# Patient Record
Sex: MALE | Race: WHITE | NOT HISPANIC OR LATINO | Employment: OTHER | URBAN - METROPOLITAN AREA
[De-identification: names, ages, dates, MRNs, and addresses within clinical notes are randomized per-mention and may not be internally consistent; named-entity substitution may affect disease eponyms.]

---

## 2017-01-11 ENCOUNTER — APPOINTMENT (OUTPATIENT)
Dept: PAIN MEDICINE | Facility: HOSPITAL | Age: 62
End: 2017-01-11

## 2017-01-16 ENCOUNTER — HOSPITAL ENCOUNTER (OUTPATIENT)
Dept: GENERAL RADIOLOGY | Facility: HOSPITAL | Age: 62
Discharge: HOME | End: 2017-01-16

## 2017-01-16 ENCOUNTER — ANESTHESIA (OUTPATIENT)
Dept: PAIN MEDICINE | Facility: HOSPITAL | Age: 62
End: 2017-01-16

## 2017-01-16 ENCOUNTER — ANESTHESIA EVENT (OUTPATIENT)
Dept: PAIN MEDICINE | Facility: HOSPITAL | Age: 62
End: 2017-01-16

## 2017-01-16 ENCOUNTER — HOSPITAL ENCOUNTER (OUTPATIENT)
Dept: PAIN MEDICINE | Facility: HOSPITAL | Age: 62
Discharge: HOME | End: 2017-01-16
Admitting: ORTHOPAEDIC SURGERY

## 2017-01-16 VITALS
TEMPERATURE: 98.5 F | SYSTOLIC BLOOD PRESSURE: 190 MMHG | HEART RATE: 64 BPM | HEIGHT: 68 IN | WEIGHT: 215 LBS | OXYGEN SATURATION: 97 % | DIASTOLIC BLOOD PRESSURE: 103 MMHG | BODY MASS INDEX: 32.58 KG/M2 | RESPIRATION RATE: 16 BRPM

## 2017-01-16 DIAGNOSIS — R52 PAIN: ICD-10-CM

## 2017-01-16 PROCEDURE — C1755 CATHETER, INTRASPINAL: HCPCS

## 2017-01-16 PROCEDURE — 77003 FLUOROGUIDE FOR SPINE INJECT: CPT

## 2017-01-16 PROCEDURE — 25010000002 METHYLPREDNISOLONE PER 80 MG: Performed by: ANESTHESIOLOGY

## 2017-01-16 PROCEDURE — 0 IOPAMIDOL 41 % SOLUTION: Performed by: ANESTHESIOLOGY

## 2017-01-16 RX ORDER — LIDOCAINE HYDROCHLORIDE 10 MG/ML
1 INJECTION, SOLUTION INFILTRATION; PERINEURAL ONCE
Status: DISCONTINUED | OUTPATIENT
Start: 2017-01-16 | End: 2017-01-17 | Stop reason: HOSPADM

## 2017-01-16 RX ORDER — SODIUM CHLORIDE 0.9 % (FLUSH) 0.9 %
1-10 SYRINGE (ML) INJECTION AS NEEDED
Status: DISCONTINUED | OUTPATIENT
Start: 2017-01-16 | End: 2017-01-17 | Stop reason: HOSPADM

## 2017-01-16 RX ORDER — FENTANYL CITRATE 50 UG/ML
50 INJECTION, SOLUTION INTRAMUSCULAR; INTRAVENOUS
Status: DISCONTINUED | OUTPATIENT
Start: 2017-01-16 | End: 2017-01-17 | Stop reason: HOSPADM

## 2017-01-16 RX ORDER — BUPIVACAINE HYDROCHLORIDE 2.5 MG/ML
30 INJECTION, SOLUTION EPIDURAL; INFILTRATION; INTRACAUDAL ONCE
Status: DISCONTINUED | OUTPATIENT
Start: 2017-01-16 | End: 2017-01-17 | Stop reason: HOSPADM

## 2017-01-16 RX ORDER — METHYLPREDNISOLONE ACETATE 80 MG/ML
80 INJECTION, SUSPENSION INTRA-ARTICULAR; INTRALESIONAL; INTRAMUSCULAR; SOFT TISSUE ONCE
Status: COMPLETED | OUTPATIENT
Start: 2017-01-16 | End: 2017-01-16

## 2017-01-16 RX ORDER — MIDAZOLAM HYDROCHLORIDE 1 MG/ML
1 INJECTION INTRAMUSCULAR; INTRAVENOUS
Status: DISCONTINUED | OUTPATIENT
Start: 2017-01-16 | End: 2017-01-17 | Stop reason: HOSPADM

## 2017-01-16 RX ADMIN — IOPAMIDOL 10 ML: 408 INJECTION, SOLUTION INTRATHECAL at 12:47

## 2017-01-16 RX ADMIN — METHYLPREDNISOLONE ACETATE 80 MG: 80 INJECTION, SUSPENSION INTRA-ARTICULAR; INTRALESIONAL; INTRAMUSCULAR; SOFT TISSUE at 12:48

## 2017-01-16 NOTE — ANESTHESIA PROCEDURE NOTES
PAIN Epidural block    Patient location during procedure: pain clinic  Indication:procedure for pain  Performed By  Anesthesiologist: MARK ROD  Preanesthetic Checklist  Completed: patient identified, site marked, surgical consent, pre-op evaluation, timeout performed, risks and benefits discussed and monitors and equipment checked  Additional Notes  Depomedrol - 80mg    Needle position confirmed by fluoroscopy and epidurogram using 2cc of [id number].    Diagnosis  Post-Op Diagnosis Codes:     * Lumbar radiculopathy (M54.16)     * Lumbar degenerative disc disease (M51.36)    Epidural Block Prep:  Pt Position:prone  Sterile Tech:cap, gloves, mask and sterile barrier  Monitoring:blood pressure monitoring, continuous pulse oximetry and EKG  Epidural Block Procedure:  Approach:left paramedian  Location:lumbar  Level:4-5  Needle Type:Tuohy  Needle Gauge:20  Aspiration:negative  Medications:  Depomedrol:80 mg  Preservative Free Saline:2mL  Isovue:2mL    Post Assessment:  Post-procedure: bandaide.  Pt Tolerance:patient tolerated the procedure well with no apparent complications  Complications:no

## 2017-01-16 NOTE — H&P
[facility]    History and Physical    Patient Name: Milton Figueroa  :  1955  MRN:  [id number]  Date of Admission: 2017    Subjective     Patient is a 61 y.o. male presents with chief complaint of chronic low back pain.  Onset of symptoms was gradual starting several months ago.  Symptoms are associated/aggravated by twisting. Symptoms improve with relaxation    The following portions of the patients history were reviewed and updated as appropriate: current medications, allergies, past medical history, past surgical history, past family history, past social history and problem list                Objective     Past Medical History:   Past Medical History   Diagnosis Date   • Acid reflux    • Disease of thyroid gland    • Hypertension    • Low back pain      Past Surgical History:   Past Surgical History   Procedure Laterality Date   • Appendectomy     • Epidural block injection     • Knee surgery Bilateral    • Shoulder surgery Right    • Liver transplantation       Family History: History reviewed. No pertinent family history.  Social History:   Social History   Substance Use Topics   • Smoking status: Former Smoker   • Smokeless tobacco: None   • Alcohol use No       Vital Signs Range for the last 24 hours  Temperature:     Temp Source:     BP:     Pulse:     Respirations:     SPO2:     O2 Amount (l/min):     O2 Devices     Weight:           --------------------------------------------------------------------------------    Current Outpatient Prescriptions   Medication Sig Dispense Refill   • esomeprazole (NexIUM) 40 MG capsule Take 40 mg by mouth Every Morning Before Breakfast.     • hydrALAZINE (APRESOLINE) 25 MG tablet Take 25 mg by mouth Daily.     • levothyroxine (SYNTHROID, LEVOTHROID) 25 MCG tablet Take 25 mcg by mouth.     • sirolimus (RAPAMUNE) 1 MG tablet Take 2 mg by mouth.     • spironolactone (ALDACTONE) 100 MG tablet Take 100 mg by mouth.     • Naproxen & Capsaicin-Menthol 500 &  0.0375-5 MG & % therapy 500 mg by Combination route Daily.       Current Facility-Administered Medications   Medication Dose Route Frequency Provider Last Rate Last Dose   • bupivacaine PF (MARCAINE) 0.25 % injection 30 mL  30 mL Infiltration Once Pascual Harman MD       • FentaNYL Citrate (PF) (SUBLIMAZE) injection 50 mcg  50 mcg Intravenous Q5 Min PRN Pascual Harman MD       • iopamidol (ISOVUE-M 200) injection 41%  2 mL Injection Once in imaging Pascual Harman MD       • lidocaine (XYLOCAINE) injection 1 mL  1 mL Intradermal Once Pascual Harman MD       • methylPREDNISolone acetate (DEPO-medrol) injection 80 mg  80 mg Intramuscular Once Pascual Harman MD       • midazolam (VERSED) injection 1 mg  1 mg Intravenous Q5 Min PRN Pascual Harman MD       • sodium chloride 0.9 % flush 1-10 mL  1-10 mL Intravenous PRN Pascual Harman MD           --------------------------------------------------------------------------------  Assessment/Plan     Anesthesia Evaluation    Diagnosis and Plan    Treatment Plan  Diagnosis     * Lumbar radiculopathy [M54.16]     * Lumbar degenerative disc disease [M51.36]                  CHIEF COMPLAINT: Low back pain        HISTORY OF PRESENT ILLNESS:  Low back pain that radiates to extremity, near constant in nature. Pain is sharp, burning.     PAST MEDICAL HISTORY:  No known drug allergies        SOCIAL HISTORY:  No Tobacco     REVIEW OF SYSTEMS:  No hematologic infectious or constitutional symptoms     PHYSICAL EXAM:  /89 pulse 66 respirations 16 sat 97% temp 36.7   Well-developed well-nourished no acute distress  Extra ocular movements intact  Mallampati class II airway  Cardiac: Regular rate and rhythm  Lungs: Clear to auscultation bilaterally with good effort  Alert and oriented ×3  Deep tendon reflexes normal in the bilateral bicep and tricep   5 out of 5 strength bilateral upper and lower extremities  Lumbarl spine without obvious  deformities ecchymoses  Lumbar spine nontender to palpation        DIAGNOSIS:  Lumbar degenerative disc disease and radiculopathy.  Pain is improved by >50%.    PLAN:  1. Lumbar epidural steroid injections, spaced 1-2 weeks apart. If pain control is acceptable after 1 or 2 injections, it was discussed with the patient that they may return for the subsequent injections if and when their pain returns. The risks were discussed with the patient including failure of relief, worsening pain, Headache (post dural puncture headache), bleeding (epidural hematoma) and infection (epidural abscess or skin infection).  2. Physical therapy exercises at home as prescribed by physical therapy or from the pain clinic handout (given to the patient). Continuation of these exercises every day, or multiple times per week, even when the patient has good pain relief, was stressed to the patient as a preventative measure to decrease the frequency and severity of future pain episodes.  3. Continue pain medicines as already prescribed. If patient not currently taking any, it is recommended to begin Acetaminophen 1000 mg po q 8 hours. If other medicines containing Acetaminophen are currently prescribed, maintain daily dose at 3000 mg.   4. If they can tolerate NSAIDS, it is recommended to take Ibuprofen 600 mg po q 6 hours for 7 days during pain exacerbations. Alternatively, they may substitute an NSAID of their choice (e.g. Aleve). This may be taken at the same time as Acetaminophen.  5. Heat and ice to the affected area as tolerated for pain control. It was discussed that heating pads can cause burns.  6. Low impact exercise such as walking or water exercise was recommended to maintain overall health and aid in weight control.   7. Follow up as needed for subsequent injections.  8. Patient was counseled to abstain from tobacco products.

## 2017-01-16 NOTE — IP AVS SNAPSHOT
AFTER VISIT SUMMARY             Milton Figueroa           About your hospitalization     You last received care in the:  [facility]    Unit phone number:  [phone]       Medications    If you or your caregiver advised us that you are currently taking a medication and that medication is marked below as “Resume”, this simply indicates that we have reviewed those medications to make sure our new therapy recommendations do not interfere.  If you have concerns about medications other than those new ones which we are prescribing today, please consult the physician who prescribed them (or your primary physician).  Our review of your home medications is not meant to indicate that we are directing their use.             Your Medications      CONTINUE taking these medications     esomeprazole 40 MG capsule   Take 40 mg by mouth Every Morning Before Breakfast.   Commonly known as:  nexIUM           hydrALAZINE 25 MG tablet   Take 25 mg by mouth Daily.   Commonly known as:  APRESOLINE           levothyroxine 25 MCG tablet   Take 25 mcg by mouth.   Commonly known as:  SYNTHROID, LEVOTHROID           Naproxen & Capsaicin-Menthol 500 & 0.0375-5 MG & % therapy   500 mg by Combination route Daily.           RAPAMUNE 1 MG tablet   Take 2 mg by mouth.   Generic drug:  sirolimus           spironolactone 100 MG tablet   Take 100 mg by mouth.   Commonly known as:  ALDACTONE                    Instructions for After Discharge        Discharge References/Attachments     EPIDURAL STEROID INJECTION, CARE AFTER (ENGLISH)    EPIDURAL STEROID INJECTION (ENGLISH)    BACK EXERCISES, EASY-TO-READ (ENGLISH)       Follow-ups for After Discharge        Chato Signup     [facility] [employer] allows you to send messages to your doctor, view your test results, renew your prescriptions, schedule appointments, and more. To sign up, go to "[employer] and click on the Sign Up Now link in the New User? box. Enter  your [employer] Activation Code exactly as it appears below along with the last four digits of your Social Security Number and your Date of Birth () to complete the sign-up process. If you do not sign up before the expiration date, you must request a new code.    [employer] Activation Code: [email or web address]  Expires: 2017 12:58 PM    If you have questions, you can email [email or web address] or call [phone] to talk to our [employer] staff. Remember, [employer] is NOT to be used for urgent needs. For medical emergencies, dial 911.           Summary of Your Hospitalization        Reason for Hospitalization     Your primary diagnosis was:  Not on File      Care Providers     Not on file      Your Allergies  Date Reviewed: 2017    Allergen Reactions    Lisinopril Shortness Of Breath  Swelling         Metoprolol Swelling         Penicillins Other (See Comments)    unknown      Patient Belongings Returned     Document Return of Belongings Flowsheet     Were the patient bedside belongings sent home?   --   Belongings Retrieved from Security & Sent Home   --    Belongings Sent to Safe   --   Medications Retrieved from Pharmacy & Sent Home   --              More Information      Epidural Steroid Injection, Care After  Refer to this sheet in the next few weeks. These instructions provide you with information on caring for yourself after your procedure. Your health care provider may also give you more specific instructions. Your treatment has been planned according to current medical practices, but problems sometimes occur. Call your health care provider if you have any problems or questions after your procedure.  WHAT TO EXPECT AFTER THE PROCEDURE  After your procedure, it is typical to have minimal discomfort at the injection site.  HOME CARE INSTRUCTIONS   · Avoid the use of heat on the injection site for 1 day.  · Do not take a tub bath or soak in water the day of the procedure.  · Remove the bandage on the  day after the procedure.  · Resume your normal activities the day after the procedure.  · Apply ice to reduce the soreness around the injection site:    Put ice in a plastic bag.    Place a towel between your skin and the bag.    Leave the ice on for 20 minutes, 2-3 times a day.  · Follow up with your health care provider 7-10 days after the procedure.  SEEK MEDICAL CARE IF:   · You have a fever.  · You continue to have pain and soreness around the injection site, even after taking medicines.  · You have significant nausea or vomiting.  · You have a severe headache.  SEEK IMMEDIATE MEDICAL CARE IF:   · You have severe pain at the injection site, which is not relieved by medicines.  · You have a severe headache, stiff neck, or sensitivity to light.  · You have any new numbness or weakness in your legs or arms.  · You lose control over your bladder or bowel movements.  · You have difficulty breathing.     This information is not intended to replace advice given to you by your health care provider. Make sure you discuss any questions you have with your health care provider.     Document Released: 04/03/2012 Document Revised: 01/08/2016 Document Reviewed: 06/06/2014  [employer] Interactive Patient Education ©2016 [employer]          Epidural Steroid Injection  An epidural steroid injection is given to relieve pain in your neck, back, or legs that is caused by the irritation or swelling of a nerve root. This procedure involves injecting a steroid and numbing medicine (anesthetic) into the epidural space. The epidural space is the space between the outer covering of your spinal cord and the bones that form your backbone (vertebra).   LET YOUR HEALTH CARE PROVIDER KNOW ABOUT:   · Any allergies you have.  · All medicines you are taking, including vitamins, herbs, eye drops, creams, and over-the-counter medicines such as aspirin.  · Previous problems you or members of your family have had with the use of anesthetics.  · Any  blood disorders or blood clotting disorders you have.  · Previous surgeries you have had.  · Medical conditions you have.  RISKS AND COMPLICATIONS  Generally, this is a safe procedure. However, as with any procedure, complications can occur. Possible complications of epidural steroid injection include:  · Headache.  · Bleeding.  · Infection.  · Allergic reaction to the medicines.  · Damage to your nerves.  The response to this procedure depends on the underlying cause of the pain and its duration. People who have long-term (chronic) pain are less likely to benefit from epidural steroids than are those people whose pain comes on strong and suddenly.  BEFORE THE PROCEDURE   · Ask your health care provider about changing or stopping your regular medicines. You may be advised to stop taking blood-thinning medicines a few days before the procedure.  · You may be given medicines to reduce anxiety.  · Arrange for someone to take you home after the procedure.  PROCEDURE   · You will remain awake during the procedure. You may receive medicine to make you relaxed.  · You will be asked to lie on your stomach.  · The injection site will be cleaned.  · The injection site will be numbed with a medicine (local anesthetic).  · A needle will be injected through your skin into the epidural space.  · Your health care provider will use an X-ray machine to ensure that the steroid is delivered closest to the affected nerve. You may have minimal discomfort at this time.  · Once the needle is in the right position, the local anesthetic and the steroid will be injected into the epidural space.  · The needle will then be removed and a bandage will be applied to the injection site.  AFTER THE PROCEDURE   · You may be monitored for a short time before you go home.  · You may feel weakness or numbness in your arm or leg, which disappears within hours.  · You may be allowed to eat, drink, and take your regular medicine.  · You may have soreness  at the site of the injection.     This information is not intended to replace advice given to you by your health care provider. Make sure you discuss any questions you have with your health care provider.     Document Released: 2009 Document Revised: 2014 Document Reviewed: 2014  [employer] Interactive Patient Education © [employer]          Back Exercises  If you have pain in your back, do these exercises 2-3 times each day or as told by your doctor. When the pain goes away, do the exercises once each day, but repeat the steps more times for each exercise (do more repetitions). If you do not have pain in your back, do these exercises once each day or as told by your doctor.  EXERCISES  Single Knee to Chest  Do these steps 3-5 times in a row for each le. Lie on your back on a firm bed or the floor with your legs stretched out.  2. Bring one knee to your chest.  3. Hold your knee to your chest by grabbing your knee or thigh.  4. Pull on your knee until you feel a gentle stretch in your lower back.  5. Keep doing the stretch for 10-30 seconds.  6. Slowly let go of your leg and straighten it.  Pelvic Tilt  Do these steps 5-10 times in a row:  1. Lie on your back on a firm bed or the floor with your legs stretched out.  2. Bend your knees so they point up to the ceiling. Your feet should be flat on the floor.  3. Tighten your lower belly (abdomen) muscles to press your lower back against the floor. This will make your tailbone point up to the ceiling instead of pointing down to your feet or the floor.  4. Stay in this position for 5-10 seconds while you gently tighten your muscles and breathe evenly.  Cat-Cow  Do these steps until your lower back bends more easily:  1. Get on your hands and knees on a firm surface. Keep your hands under your shoulders, and keep your knees under your hips. You may put padding under your knees.  2. Let your head hang down, and make your tailbone point down to  the floor so your lower back is round like the back of a cat.  3. Stay in this position for 5 seconds.  4. Slowly lift your head and make your tailbone point up to the ceiling so your back hangs low (sags) like the back of a cow.  5. Stay in this position for 5 seconds.  Press-Ups  Do these steps 5-10 times in a row:  1. Lie on your belly (face-down) on the floor.  2. Place your hands near your head, about shoulder-width apart.  3. While you keep your back relaxed and keep your hips on the floor, slowly straighten your arms to raise the top half of your body and lift your shoulders. Do not use your back muscles. To make yourself more comfortable, you may change where you place your hands.  4. Stay in this position for 5 seconds.  5. Slowly return to lying flat on the floor.  Bridges  Do these steps 10 times in a row:  1. Lie on your back on a firm surface.  2. Bend your knees so they point up to the ceiling. Your feet should be flat on the floor.  3. Tighten your butt muscles and lift your butt off of the floor until your waist is almost as high as your knees. If you do not feel the muscles working in your butt and the back of your thighs, slide your feet 1-2 inches farther away from your butt.  4. Stay in this position for 3-5 seconds.  5. Slowly lower your butt to the floor, and let your butt muscles relax.  If this exercise is too easy, try doing it with your arms crossed over your chest.  Belly Crunches  Do these steps 5-10 times in a row:  1. Lie on your back on a firm bed or the floor with your legs stretched out.  2. Bend your knees so they point up to the ceiling. Your feet should be flat on the floor.  3. Cross your arms over your chest.  4. Tip your chin a little bit toward your chest but do not bend your neck.  5. Tighten your belly muscles and slowly raise your chest just enough to lift your shoulder blades a tiny bit off of the floor.  6. Slowly lower your chest and your head to the floor.  Back  Lifts  Do these steps 5-10 times in a row:  1. Lie on your belly (face-down) with your arms at your sides, and rest your forehead on the floor.  2. Tighten the muscles in your legs and your butt.  3. Slowly lift your chest off of the floor while you keep your hips on the floor. Keep the back of your head in line with the curve in your back. Look at the floor while you do this.  4. Stay in this position for 3-5 seconds.  5. Slowly lower your chest and your face to the floor.  GET HELP IF:  · Your back pain gets a lot worse when you do an exercise.  · Your back pain does not lessen 2 hours after you exercise.  If you have any of these problems, stop doing the exercises. Do not do them again unless your doctor says it is okay.  GET HELP RIGHT AWAY IF:  · You have sudden, very bad back pain. If this happens, stop doing the exercises. Do not do them again unless your doctor says it is okay.     This information is not intended to replace advice given to you by your health care provider. Make sure you discuss any questions you have with your health care provider.     Document Released: 01/20/2012 Document Revised: 09/07/2016 Document Reviewed: 02/11/2016  [employer] Interactive Patient Education ©2016 [employer]            SYMPTOMS OF A STROKE    Call 911 or have someone take you to the Emergency Department if you have any of the following:    · Sudden numbness or weakness of your face, arm or leg especially on one side of the body  · Sudden confusion, diffiiculty speaking or trouble understanding   · Changes in your vision or loss of sight in one eye  · Sudden severe headache with no known cause  · sudden dizziness, trouble walking, loss of balance or coordination    It is important to seek emergency care right away if you have further stroke symptoms. If you get emergency help quickly, the powerful clot-dissolving medicines can reduce the disabilities caused by a stroke.     For more information:    American Stroke  Association  4-577-1-STROKE  www.strokeassociation.org           IF YOU SMOKE OR USE TOBACCO PLEASE READ THE FOLLOWING:    Why is smoking bad for me?  Smoking increases the risk of heart disease, lung disease, vascular disease, stroke, and cancer.     If you smoke, STOP!    If you would like more information on quitting smoking, please visit the [employer] website: [email or web address]   This link will provide additional resources including the QUIT line and the Beat the Pack support groups.     For more information:    American Cancer Society  (701) 615-2100    American Heart Association  1-256.166.3122               YOU ARE THE MOST IMPORTANT FACTOR IN YOUR RECOVERY.     Follow all instructions carefully.     I have reviewed my discharge instructions with my nurse, including the following information, if applicable:     Information about my illness and diagnosis   Follow up appointments (including lab draws)   Wound Care   Equipment Needs   Medications (new and continuing) along with side effects   Preventative information such as vaccines and smoking cessations   Diet   Pain   I know when to contact my Doctor's office or seek emergency care      I want my nurse to describe the side effects of my medications: YES NO   If the answer is no, I understand the side effects of my medications: YES NO   My nurse described the side effects of my medications in a way that I could understand: YES NO   I have taken my personal belongings and my own medications with me at discharge: YES NO            I have received this information and my questions have been answered. I have discussed any concerns I see with this plan with the nurse or physician. I understand these instructions.    Signature of Patient or Responsible Person: _____________________________________    Date: _________________  Time: __________________    Signature of Healthcare Provider:  _______________________________________  Date: _________________  Time: __________________

## 2017-03-16 ENCOUNTER — HOSPITAL ENCOUNTER (OUTPATIENT)
Dept: GENERAL RADIOLOGY | Facility: HOSPITAL | Age: 62
Discharge: HOME | End: 2017-03-16
Attending: ORTHOPAEDIC SURGERY | Admitting: ORTHOPAEDIC SURGERY

## 2017-03-16 ENCOUNTER — APPOINTMENT (OUTPATIENT)
Dept: PREADMISSION TESTING | Facility: HOSPITAL | Age: 62
End: 2017-03-16

## 2017-03-16 VITALS
HEART RATE: 70 BPM | SYSTOLIC BLOOD PRESSURE: 178 MMHG | TEMPERATURE: 98.3 F | HEIGHT: 68 IN | WEIGHT: 228 LBS | BODY MASS INDEX: 34.56 KG/M2 | DIASTOLIC BLOOD PRESSURE: 109 MMHG | OXYGEN SATURATION: 99 % | RESPIRATION RATE: 16 BRPM

## 2017-03-16 LAB
ABO GROUP BLD: NORMAL
ALBUMIN SERPL-MCNC: 4 G/DL (ref 3.5–5.2)
ALBUMIN/GLOB SERPL: 1.4 G/DL
ALP SERPL-CCNC: 68 U/L (ref 39–117)
ALT SERPL W P-5'-P-CCNC: 26 U/L (ref 1–41)
ANION GAP SERPL CALCULATED.3IONS-SCNC: 15.2 MMOL/L
APTT PPP: 28 SECONDS (ref 22.7–35.4)
AST SERPL-CCNC: 22 U/L (ref 1–40)
BACTERIA UR QL AUTO: NORMAL /HPF
BASOPHILS # BLD AUTO: 0.01 10*3/MM3 (ref 0–0.2)
BASOPHILS NFR BLD AUTO: 0.1 % (ref 0–1.5)
BILIRUB SERPL-MCNC: 0.2 MG/DL (ref 0.1–1.2)
BILIRUB UR QL STRIP: NEGATIVE
BLD GP AB SCN SERPL QL: NEGATIVE
BUN BLD-MCNC: 24 MG/DL (ref 8–23)
BUN/CREAT SERPL: 20.7 (ref 7–25)
CALCIUM SPEC-SCNC: 9.4 MG/DL (ref 8.6–10.5)
CHLORIDE SERPL-SCNC: 99 MMOL/L (ref 98–107)
CLARITY UR: CLEAR
CO2 SERPL-SCNC: 21.8 MMOL/L (ref 22–29)
COLOR UR: YELLOW
CREAT BLD-MCNC: 1.16 MG/DL (ref 0.76–1.27)
DEPRECATED RDW RBC AUTO: 40.8 FL (ref 37–54)
EOSINOPHIL # BLD AUTO: 0.01 10*3/MM3 (ref 0–0.7)
EOSINOPHIL NFR BLD AUTO: 0.1 % (ref 0.3–6.2)
ERYTHROCYTE [DISTWIDTH] IN BLOOD BY AUTOMATED COUNT: 12.6 % (ref 11.5–14.5)
GFR SERPL CREATININE-BSD FRML MDRD: 64 ML/MIN/1.73
GLOBULIN UR ELPH-MCNC: 2.9 GM/DL
GLUCOSE BLD-MCNC: 123 MG/DL (ref 65–99)
GLUCOSE UR STRIP-MCNC: NEGATIVE MG/DL
HCT VFR BLD AUTO: 37.4 % (ref 40.4–52.2)
HGB BLD-MCNC: 12.7 G/DL (ref 13.7–17.6)
HGB UR QL STRIP.AUTO: NEGATIVE
HYALINE CASTS UR QL AUTO: NORMAL /LPF
IMM GRANULOCYTES # BLD: 0.05 10*3/MM3 (ref 0–0.03)
IMM GRANULOCYTES NFR BLD: 0.6 % (ref 0–0.5)
INR PPP: 0.96 (ref 0.9–1.1)
KETONES UR QL STRIP: NEGATIVE
LEUKOCYTE ESTERASE UR QL STRIP.AUTO: NEGATIVE
LYMPHOCYTES # BLD AUTO: 0.68 10*3/MM3 (ref 0.9–4.8)
LYMPHOCYTES NFR BLD AUTO: 8 % (ref 19.6–45.3)
MCH RBC QN AUTO: 30.5 PG (ref 27–32.7)
MCHC RBC AUTO-ENTMCNC: 34 G/DL (ref 32.6–36.4)
MCV RBC AUTO: 89.9 FL (ref 79.8–96.2)
MONOCYTES # BLD AUTO: 0.84 10*3/MM3 (ref 0.2–1.2)
MONOCYTES NFR BLD AUTO: 9.8 % (ref 5–12)
NEUTROPHILS # BLD AUTO: 6.94 10*3/MM3 (ref 1.9–8.1)
NEUTROPHILS NFR BLD AUTO: 81.4 % (ref 42.7–76)
NITRITE UR QL STRIP: NEGATIVE
PH UR STRIP.AUTO: 5.5 [PH] (ref 5–8)
PLATELET # BLD AUTO: 340 10*3/MM3 (ref 140–500)
PMV BLD AUTO: 9.7 FL (ref 6–12)
POTASSIUM BLD-SCNC: 4.6 MMOL/L (ref 3.5–5.2)
PROT SERPL-MCNC: 6.9 G/DL (ref 6–8.5)
PROT UR QL STRIP: ABNORMAL
PROTHROMBIN TIME: 12.4 SECONDS (ref 11.7–14.2)
RBC # BLD AUTO: 4.16 10*6/MM3 (ref 4.6–6)
RBC # UR: NORMAL /HPF
REF LAB TEST METHOD: NORMAL
RH BLD: POSITIVE
SODIUM BLD-SCNC: 136 MMOL/L (ref 136–145)
SP GR UR STRIP: 1.02 (ref 1–1.03)
SQUAMOUS #/AREA URNS HPF: NORMAL /HPF
UROBILINOGEN UR QL STRIP: ABNORMAL
WBC NRBC COR # BLD: 8.53 10*3/MM3 (ref 4.5–10.7)
WBC UR QL AUTO: NORMAL /HPF

## 2017-03-16 PROCEDURE — 86850 RBC ANTIBODY SCREEN: CPT | Performed by: ORTHOPAEDIC SURGERY

## 2017-03-16 PROCEDURE — 81001 URINALYSIS AUTO W/SCOPE: CPT | Performed by: ORTHOPAEDIC SURGERY

## 2017-03-16 PROCEDURE — 93010 ELECTROCARDIOGRAM REPORT: CPT | Performed by: INTERNAL MEDICINE

## 2017-03-16 PROCEDURE — 80053 COMPREHEN METABOLIC PANEL: CPT | Performed by: ORTHOPAEDIC SURGERY

## 2017-03-16 PROCEDURE — 36415 COLL VENOUS BLD VENIPUNCTURE: CPT

## 2017-03-16 PROCEDURE — 86900 BLOOD TYPING SEROLOGIC ABO: CPT | Performed by: ORTHOPAEDIC SURGERY

## 2017-03-16 PROCEDURE — 85025 COMPLETE CBC W/AUTO DIFF WBC: CPT | Performed by: ORTHOPAEDIC SURGERY

## 2017-03-16 PROCEDURE — 85730 THROMBOPLASTIN TIME PARTIAL: CPT | Performed by: ORTHOPAEDIC SURGERY

## 2017-03-16 PROCEDURE — 85610 PROTHROMBIN TIME: CPT | Performed by: ORTHOPAEDIC SURGERY

## 2017-03-16 PROCEDURE — 86901 BLOOD TYPING SEROLOGIC RH(D): CPT | Performed by: ORTHOPAEDIC SURGERY

## 2017-03-16 PROCEDURE — 71020 HC CHEST PA AND LATERAL: CPT

## 2017-03-16 PROCEDURE — 93005 ELECTROCARDIOGRAM TRACING: CPT

## 2017-03-16 RX ORDER — ALBUTEROL SULFATE 90 UG/1
2 AEROSOL, METERED RESPIRATORY (INHALATION) EVERY 4 HOURS PRN
COMMUNITY

## 2017-03-16 RX ORDER — DOXYCYCLINE HYCLATE 100 MG/1
100 CAPSULE ORAL 2 TIMES DAILY
Status: ON HOLD | COMMUNITY
End: 2017-03-22

## 2017-03-16 RX ORDER — METHYLPREDNISOLONE 4 MG/1
4 TABLET ORAL
Status: ON HOLD | COMMUNITY
End: 2017-03-22

## 2017-03-16 RX ORDER — FUROSEMIDE 40 MG/1
40 TABLET ORAL AS NEEDED
COMMUNITY

## 2017-03-16 NOTE — DISCHARGE INSTRUCTIONS
SURGERY 3-24-17  ARRIVAL TIME 7:30    Take the following medications the morning of surgery with a small sip of water.    INHALER AND NEXIUM AND HYDRALAZINE AND RAPAMUNE        General Instructions:  • Do not eat or drink after midnight: includes water, mints, or gum. You may brush your teeth.  • Do not smoke, chew tobacco, or drink alcohol.  • The Pre-Admission Testing nurse will instruct you to bring medications if unable to obtain an accurate list in Pre-Admission Testing.    • If applicable bring your C-PAP/ BI-PAP machine.  • Bring any papers given to you in the doctor’s office.  • Wear clean comfortable clothes and socks.  • Do not wear contact lenses or make-up.  Bring a case for your glasses if applicable.   • Bring crutches or walker if applicable.  • Leave all other valuables and jewelry at home.    If you were given a blood bank ID arm band remember to bring it with you the day of surgery.    Preventing a Surgical Site Infection:  Shower on the morning of surgery using a fresh bar of anti-bacterial soap (such as Dial) and clean washcloth.  Dry with a clean towel and dress in clean clothing.  For 2 to 3 days before surgery, avoid shaving with a razor near where you will have surgery because the razor can irritate skin and make it easier to develop an infection  Ask your surgeon if you will be receiving antibiotics prior to surgery  Make sure you, your family, and all healthcare providers clean their hands with soap and water or an alcohol based hand  before caring for you or your wound  If at all possible, quit smoking as many days before surgery as you can.    Day of surgery:  Upon arrival, a Pre-op nurse and Anesthesiologist will review your health history, obtain vital signs, and answer questions you may have.  The only belongings needed at this time will be your home medications and if applicable your C-PAP/BI-PAP machine.  If you are staying overnight your family can leave the rest of your  belongings in the car and bring them to your room later.  A Pre-op nurse will start an IV and you may receive medication in preparation for surgery, including something to help you relax.  Your family will be able to see you in the Pre-op area.  While you are in surgery your family should notify the waiting room  if they leave the waiting room area and provide a contact phone number.    Please be aware that surgery does come with discomfort.  We want to make every effort to control your discomfort so please discuss any uncontrolled symptoms with your nurse.   Your doctor will most likely have prescribed pain medications.      If you are going home after surgery you will receive individualized written care instructions before being discharged.  A responsible adult must drive you to and from the hospital on the day of your surgery and stay with you for 24 hours.    If you are staying overnight following surgery, you will be transported to your hospital room following the recovery period.  [facility] has all private rooms.    If you have any questions please call Pre-Admission Testing at [phone].  Deductibles and co-payments are collected on the day of service. Please be prepared to pay the required co-pay, deductible or deposit on the day of service as defined by your plan.

## 2017-03-22 ENCOUNTER — APPOINTMENT (OUTPATIENT)
Dept: GENERAL RADIOLOGY | Facility: HOSPITAL | Age: 62
End: 2017-03-22

## 2017-03-22 ENCOUNTER — ANESTHESIA EVENT (OUTPATIENT)
Dept: PERIOP | Facility: HOSPITAL | Age: 62
End: 2017-03-22

## 2017-03-22 ENCOUNTER — HOSPITAL ENCOUNTER (OUTPATIENT)
Facility: HOSPITAL | Age: 62
Setting detail: HOSPITAL OUTPATIENT SURGERY
Discharge: HOME | End: 2017-03-22
Attending: ORTHOPAEDIC SURGERY | Admitting: ORTHOPAEDIC SURGERY

## 2017-03-22 ENCOUNTER — ANESTHESIA (OUTPATIENT)
Dept: PERIOP | Facility: HOSPITAL | Age: 62
End: 2017-03-22

## 2017-03-22 VITALS
OXYGEN SATURATION: 96 % | DIASTOLIC BLOOD PRESSURE: 87 MMHG | RESPIRATION RATE: 18 BRPM | WEIGHT: 222 LBS | HEIGHT: 68 IN | BODY MASS INDEX: 33.65 KG/M2 | HEART RATE: 95 BPM | SYSTOLIC BLOOD PRESSURE: 162 MMHG | TEMPERATURE: 99 F

## 2017-03-22 PROCEDURE — 25010000002 HYDROMORPHONE PER 4 MG: Performed by: NURSE ANESTHETIST, CERTIFIED REGISTERED

## 2017-03-22 PROCEDURE — 25010000002 FENTANYL CITRATE (PF) 100 MCG/2ML SOLUTION: Performed by: NURSE ANESTHETIST, CERTIFIED REGISTERED

## 2017-03-22 PROCEDURE — 25010000002 FENTANYL CITRATE (PF) 100 MCG/2ML SOLUTION: Performed by: ANESTHESIOLOGY

## 2017-03-22 PROCEDURE — 76000 FLUOROSCOPY <1 HR PHYS/QHP: CPT

## 2017-03-22 PROCEDURE — 25010000002 MIDAZOLAM PER 1 MG: Performed by: ANESTHESIOLOGY

## 2017-03-22 PROCEDURE — 25010000002 VANCOMYCIN: Performed by: ORTHOPAEDIC SURGERY

## 2017-03-22 PROCEDURE — 25010000002 DEXAMETHASONE PER 1 MG: Performed by: ORTHOPAEDIC SURGERY

## 2017-03-22 PROCEDURE — 25010000002 ONDANSETRON PER 1 MG: Performed by: NURSE ANESTHETIST, CERTIFIED REGISTERED

## 2017-03-22 PROCEDURE — 25010000002 HYDRALAZINE PER 20 MG: Performed by: NURSE ANESTHETIST, CERTIFIED REGISTERED

## 2017-03-22 PROCEDURE — 25010000002 NEOSTIGMINE 10 MG/10ML SOLUTION: Performed by: NURSE ANESTHETIST, CERTIFIED REGISTERED

## 2017-03-22 PROCEDURE — 94799 UNLISTED PULMONARY SVC/PX: CPT

## 2017-03-22 PROCEDURE — 72020 X-RAY EXAM OF SPINE 1 VIEW: CPT

## 2017-03-22 PROCEDURE — 25010000002 PROPOFOL 10 MG/ML EMULSION: Performed by: NURSE ANESTHETIST, CERTIFIED REGISTERED

## 2017-03-22 RX ORDER — MIDAZOLAM HYDROCHLORIDE 1 MG/ML
1 INJECTION INTRAMUSCULAR; INTRAVENOUS
Status: DISCONTINUED | OUTPATIENT
Start: 2017-03-22 | End: 2017-03-22 | Stop reason: HOSPADM

## 2017-03-22 RX ORDER — BUPIVACAINE HYDROCHLORIDE AND EPINEPHRINE 5; 5 MG/ML; UG/ML
INJECTION, SOLUTION PERINEURAL AS NEEDED
Status: DISCONTINUED | OUTPATIENT
Start: 2017-03-22 | End: 2017-03-22 | Stop reason: HOSPADM

## 2017-03-22 RX ORDER — FLUMAZENIL 0.1 MG/ML
0.2 INJECTION INTRAVENOUS AS NEEDED
Status: DISCONTINUED | OUTPATIENT
Start: 2017-03-22 | End: 2017-03-22 | Stop reason: HOSPADM

## 2017-03-22 RX ORDER — ROCURONIUM BROMIDE 10 MG/ML
INJECTION, SOLUTION INTRAVENOUS AS NEEDED
Status: DISCONTINUED | OUTPATIENT
Start: 2017-03-22 | End: 2017-03-22 | Stop reason: SURG

## 2017-03-22 RX ORDER — LIDOCAINE HYDROCHLORIDE 20 MG/ML
INJECTION, SOLUTION INFILTRATION; PERINEURAL AS NEEDED
Status: DISCONTINUED | OUTPATIENT
Start: 2017-03-22 | End: 2017-03-22 | Stop reason: SURG

## 2017-03-22 RX ORDER — PROMETHAZINE HYDROCHLORIDE 25 MG/ML
12.5 INJECTION, SOLUTION INTRAMUSCULAR; INTRAVENOUS ONCE AS NEEDED
Status: DISCONTINUED | OUTPATIENT
Start: 2017-03-22 | End: 2017-03-22 | Stop reason: HOSPADM

## 2017-03-22 RX ORDER — HYDRALAZINE HYDROCHLORIDE 20 MG/ML
5 INJECTION INTRAMUSCULAR; INTRAVENOUS
Status: DISCONTINUED | OUTPATIENT
Start: 2017-03-22 | End: 2017-03-22 | Stop reason: HOSPADM

## 2017-03-22 RX ORDER — DIPHENHYDRAMINE HYDROCHLORIDE 50 MG/ML
12.5 INJECTION INTRAMUSCULAR; INTRAVENOUS
Status: DISCONTINUED | OUTPATIENT
Start: 2017-03-22 | End: 2017-03-22 | Stop reason: HOSPADM

## 2017-03-22 RX ORDER — FAMOTIDINE 10 MG/ML
20 INJECTION, SOLUTION INTRAVENOUS ONCE
Status: COMPLETED | OUTPATIENT
Start: 2017-03-22 | End: 2017-03-22

## 2017-03-22 RX ORDER — ONDANSETRON 2 MG/ML
INJECTION INTRAMUSCULAR; INTRAVENOUS AS NEEDED
Status: DISCONTINUED | OUTPATIENT
Start: 2017-03-22 | End: 2017-03-22 | Stop reason: SURG

## 2017-03-22 RX ORDER — ONDANSETRON 2 MG/ML
4 INJECTION INTRAMUSCULAR; INTRAVENOUS ONCE AS NEEDED
Status: DISCONTINUED | OUTPATIENT
Start: 2017-03-22 | End: 2017-03-22 | Stop reason: HOSPADM

## 2017-03-22 RX ORDER — OXYCODONE AND ACETAMINOPHEN 7.5; 325 MG/1; MG/1
1 TABLET ORAL ONCE AS NEEDED
Status: DISCONTINUED | OUTPATIENT
Start: 2017-03-22 | End: 2017-03-22 | Stop reason: HOSPADM

## 2017-03-22 RX ORDER — HYDROCODONE BITARTRATE AND ACETAMINOPHEN 7.5; 325 MG/1; MG/1
1 TABLET ORAL ONCE AS NEEDED
Status: COMPLETED | OUTPATIENT
Start: 2017-03-22 | End: 2017-03-22

## 2017-03-22 RX ORDER — GLYCOPYRROLATE 0.2 MG/ML
INJECTION INTRAMUSCULAR; INTRAVENOUS AS NEEDED
Status: DISCONTINUED | OUTPATIENT
Start: 2017-03-22 | End: 2017-03-22 | Stop reason: SURG

## 2017-03-22 RX ORDER — PROMETHAZINE HYDROCHLORIDE 25 MG/1
25 TABLET ORAL ONCE AS NEEDED
Status: DISCONTINUED | OUTPATIENT
Start: 2017-03-22 | End: 2017-03-22 | Stop reason: HOSPADM

## 2017-03-22 RX ORDER — NALOXONE HCL 0.4 MG/ML
0.2 VIAL (ML) INJECTION AS NEEDED
Status: DISCONTINUED | OUTPATIENT
Start: 2017-03-22 | End: 2017-03-22 | Stop reason: HOSPADM

## 2017-03-22 RX ORDER — SODIUM CHLORIDE, SODIUM LACTATE, POTASSIUM CHLORIDE, CALCIUM CHLORIDE 600; 310; 30; 20 MG/100ML; MG/100ML; MG/100ML; MG/100ML
9 INJECTION, SOLUTION INTRAVENOUS CONTINUOUS
Status: DISCONTINUED | OUTPATIENT
Start: 2017-03-22 | End: 2017-03-22 | Stop reason: HOSPADM

## 2017-03-22 RX ORDER — SODIUM CHLORIDE 0.9 % (FLUSH) 0.9 %
1-10 SYRINGE (ML) INJECTION AS NEEDED
Status: DISCONTINUED | OUTPATIENT
Start: 2017-03-22 | End: 2017-03-22 | Stop reason: HOSPADM

## 2017-03-22 RX ORDER — FENTANYL CITRATE 50 UG/ML
50 INJECTION, SOLUTION INTRAMUSCULAR; INTRAVENOUS
Status: DISCONTINUED | OUTPATIENT
Start: 2017-03-22 | End: 2017-03-22 | Stop reason: HOSPADM

## 2017-03-22 RX ORDER — HYDROMORPHONE HYDROCHLORIDE 1 MG/ML
0.5 INJECTION, SOLUTION INTRAMUSCULAR; INTRAVENOUS; SUBCUTANEOUS
Status: DISCONTINUED | OUTPATIENT
Start: 2017-03-22 | End: 2017-03-22 | Stop reason: HOSPADM

## 2017-03-22 RX ORDER — PROMETHAZINE HYDROCHLORIDE 25 MG/1
25 SUPPOSITORY RECTAL ONCE AS NEEDED
Status: DISCONTINUED | OUTPATIENT
Start: 2017-03-22 | End: 2017-03-22 | Stop reason: HOSPADM

## 2017-03-22 RX ORDER — NEOSTIGMINE METHYLSULFATE 1 MG/ML
INJECTION, SOLUTION INTRAVENOUS AS NEEDED
Status: DISCONTINUED | OUTPATIENT
Start: 2017-03-22 | End: 2017-03-22 | Stop reason: SURG

## 2017-03-22 RX ORDER — HYDROMORPHONE HCL 110MG/55ML
PATIENT CONTROLLED ANALGESIA SYRINGE INTRAVENOUS AS NEEDED
Status: DISCONTINUED | OUTPATIENT
Start: 2017-03-22 | End: 2017-03-22 | Stop reason: SURG

## 2017-03-22 RX ORDER — FENTANYL CITRATE 50 UG/ML
INJECTION, SOLUTION INTRAMUSCULAR; INTRAVENOUS AS NEEDED
Status: DISCONTINUED | OUTPATIENT
Start: 2017-03-22 | End: 2017-03-22 | Stop reason: SURG

## 2017-03-22 RX ORDER — PROMETHAZINE HYDROCHLORIDE 25 MG/1
12.5 TABLET ORAL ONCE AS NEEDED
Status: DISCONTINUED | OUTPATIENT
Start: 2017-03-22 | End: 2017-03-22 | Stop reason: HOSPADM

## 2017-03-22 RX ORDER — MIDAZOLAM HYDROCHLORIDE 1 MG/ML
2 INJECTION INTRAMUSCULAR; INTRAVENOUS
Status: DISCONTINUED | OUTPATIENT
Start: 2017-03-22 | End: 2017-03-22 | Stop reason: HOSPADM

## 2017-03-22 RX ORDER — PROPOFOL 10 MG/ML
VIAL (ML) INTRAVENOUS AS NEEDED
Status: DISCONTINUED | OUTPATIENT
Start: 2017-03-22 | End: 2017-03-22 | Stop reason: SURG

## 2017-03-22 RX ORDER — HYDROCODONE BITARTRATE AND ACETAMINOPHEN 5; 325 MG/1; MG/1
1 TABLET ORAL EVERY 6 HOURS PRN
Qty: 60 TABLET | Refills: 0 | Status: SHIPPED | OUTPATIENT
Start: 2017-03-22 | End: 2017-05-14 | Stop reason: HOSPADM

## 2017-03-22 RX ADMIN — MIDAZOLAM 2 MG: 1 INJECTION INTRAMUSCULAR; INTRAVENOUS at 09:28

## 2017-03-22 RX ADMIN — VANCOMYCIN HYDROCHLORIDE 1500 MG: 1 INJECTION, POWDER, LYOPHILIZED, FOR SOLUTION INTRAVENOUS at 09:59

## 2017-03-22 RX ADMIN — FENTANYL CITRATE 50 MCG: 50 INJECTION INTRAMUSCULAR; INTRAVENOUS at 09:32

## 2017-03-22 RX ADMIN — FAMOTIDINE 20 MG: 10 INJECTION, SOLUTION INTRAVENOUS at 09:28

## 2017-03-22 RX ADMIN — HYDROMORPHONE HYDROCHLORIDE 0.4 MG: 2 INJECTION, SOLUTION INTRAMUSCULAR; INTRAVENOUS; SUBCUTANEOUS at 10:41

## 2017-03-22 RX ADMIN — SODIUM CHLORIDE, POTASSIUM CHLORIDE, SODIUM LACTATE AND CALCIUM CHLORIDE: 600; 310; 30; 20 INJECTION, SOLUTION INTRAVENOUS at 11:24

## 2017-03-22 RX ADMIN — ONDANSETRON 4 MG: 2 INJECTION INTRAMUSCULAR; INTRAVENOUS at 11:26

## 2017-03-22 RX ADMIN — SODIUM CHLORIDE, POTASSIUM CHLORIDE, SODIUM LACTATE AND CALCIUM CHLORIDE 9 ML/HR: 600; 310; 30; 20 INJECTION, SOLUTION INTRAVENOUS at 09:17

## 2017-03-22 RX ADMIN — HYDROCODONE BITARTRATE AND ACETAMINOPHEN 1 TABLET: 7.5; 325 TABLET ORAL at 12:13

## 2017-03-22 RX ADMIN — LIDOCAINE HYDROCHLORIDE 60 MG: 20 INJECTION, SOLUTION INFILTRATION; PERINEURAL at 10:15

## 2017-03-22 RX ADMIN — HYDROMORPHONE HYDROCHLORIDE 0.5 MG: 1 INJECTION, SOLUTION INTRAMUSCULAR; INTRAVENOUS; SUBCUTANEOUS at 12:00

## 2017-03-22 RX ADMIN — FENTANYL CITRATE 50 MCG: 50 INJECTION INTRAMUSCULAR; INTRAVENOUS at 11:58

## 2017-03-22 RX ADMIN — HYDROMORPHONE HYDROCHLORIDE 0.5 MG: 1 INJECTION, SOLUTION INTRAMUSCULAR; INTRAVENOUS; SUBCUTANEOUS at 12:35

## 2017-03-22 RX ADMIN — HYDRALAZINE HYDROCHLORIDE 5 MG: 20 INJECTION INTRAMUSCULAR; INTRAVENOUS at 12:23

## 2017-03-22 RX ADMIN — GLYCOPYRROLATE 0.6 MG: 0.2 INJECTION INTRAMUSCULAR; INTRAVENOUS at 11:34

## 2017-03-22 RX ADMIN — FENTANYL CITRATE 50 MCG: 50 INJECTION INTRAMUSCULAR; INTRAVENOUS at 11:49

## 2017-03-22 RX ADMIN — HYDRALAZINE HYDROCHLORIDE 5 MG: 20 INJECTION INTRAMUSCULAR; INTRAVENOUS at 12:04

## 2017-03-22 RX ADMIN — FENTANYL CITRATE 50 MCG: 50 INJECTION INTRAMUSCULAR; INTRAVENOUS at 09:52

## 2017-03-22 RX ADMIN — HYDROMORPHONE HYDROCHLORIDE 0.5 MG: 1 INJECTION, SOLUTION INTRAMUSCULAR; INTRAVENOUS; SUBCUTANEOUS at 12:12

## 2017-03-22 RX ADMIN — ROCURONIUM BROMIDE 40 MG: 10 INJECTION INTRAVENOUS at 10:15

## 2017-03-22 RX ADMIN — PROPOFOL 200 MG: 10 INJECTION, EMULSION INTRAVENOUS at 10:15

## 2017-03-22 RX ADMIN — HYDROMORPHONE HYDROCHLORIDE 0.4 MG: 2 INJECTION, SOLUTION INTRAMUSCULAR; INTRAVENOUS; SUBCUTANEOUS at 11:49

## 2017-03-22 RX ADMIN — FENTANYL CITRATE 50 MCG: 50 INJECTION INTRAMUSCULAR; INTRAVENOUS at 12:28

## 2017-03-22 RX ADMIN — DEXAMETHASONE SODIUM PHOSPHATE 10 MG: 4 INJECTION, SOLUTION INTRAMUSCULAR; INTRAVENOUS at 12:58

## 2017-03-22 RX ADMIN — FENTANYL CITRATE 50 MCG: 50 INJECTION INTRAMUSCULAR; INTRAVENOUS at 10:14

## 2017-03-22 RX ADMIN — FENTANYL CITRATE 50 MCG: 50 INJECTION INTRAMUSCULAR; INTRAVENOUS at 12:42

## 2017-03-22 RX ADMIN — HYDROMORPHONE HYDROCHLORIDE 0.5 MG: 1 INJECTION, SOLUTION INTRAMUSCULAR; INTRAVENOUS; SUBCUTANEOUS at 12:20

## 2017-03-22 RX ADMIN — FENTANYL CITRATE 50 MCG: 50 INJECTION INTRAMUSCULAR; INTRAVENOUS at 12:11

## 2017-03-22 RX ADMIN — NEOSTIGMINE METHYLSULFATE 4 MG: 1 INJECTION INTRAVENOUS at 11:34

## 2017-03-22 RX ADMIN — FENTANYL CITRATE 50 MCG: 50 INJECTION INTRAMUSCULAR; INTRAVENOUS at 10:37

## 2017-03-22 NOTE — PLAN OF CARE
Problem: Patient Care Overview (Adult)  Goal: Plan of Care Review  Outcome: Outcome(s) achieved Date Met:  03/22/17 03/22/17 1500   Coping/Psychosocial Response Interventions   Plan Of Care Reviewed With patient;friend   Patient Care Overview   Progress improving   Outcome Evaluation   Outcome Summary/Follow up Plan ready for discharge       Goal: Discharge Needs Assessment  Outcome: Outcome(s) achieved Date Met:  03/22/17    Problem: Perioperative Period (Adult)  Goal: Signs and Symptoms of Listed Potential Problems Will be Absent or Manageable (Perioperative Period)  Outcome: Outcome(s) achieved Date Met:  03/22/17

## 2017-03-22 NOTE — ANESTHESIA PREPROCEDURE EVALUATION
Anesthesia Evaluation     Patient summary reviewed   NPO Status: > 8 hours   Airway   Mallampati: II  no difficulty expected  Dental - normal exam     Pulmonary - normal exam   (+) asthma,   Cardiovascular - normal exam    ECG reviewed    (+) hypertension, valvular problems/murmurs AS, dysrhythmias Atrial Fib,       Neuro/Psych  GI/Hepatic/Renal/Endo    (+)  GERD, hepatitis C, liver disease (liver transplant), hypothyroidism,     Musculoskeletal     Abdominal    Substance History      OB/GYN          Other                                    Anesthesia Plan    ASA 3     general     Anesthetic plan and risks discussed with patient.

## 2017-03-22 NOTE — ANESTHESIA POSTPROCEDURE EVALUATION
Patient: Milton Figueroa    Procedure Summary     Date Anesthesia Start Anesthesia Stop Room / Location    03/22/17 1012 1151  [facility]       Procedure Diagnosis Surgeon Provider    BILATERAL L 4-5 MICROLAMINECTOMY WITH METRIX  (Bilateral Spine Lumbar); ARTHROCENTESIS ASPIRATION RIGHT KNEE  (Right ) No diagnosis on file. DO Rupinder Edwards MD          Anesthesia Type: general  Last vitals  /83 (03/22/17 1420)    Temp 37.2 °C (99 °F) (03/22/17 1355)    Pulse 92 (03/22/17 1420)   Resp 18 (03/22/17 1420)    SpO2 99 % (03/22/17 1420)      Post Anesthesia Care and Evaluation    Patient location during evaluation: PACU  Patient participation: complete - patient participated  Level of consciousness: awake and alert  Pain management: adequate  Airway patency: patent  Anesthetic complications: No anesthetic complications    Cardiovascular status: acceptable  Respiratory status: acceptable  Hydration status: acceptable

## 2017-03-22 NOTE — PERIOPERATIVE NURSING NOTE
Informed Dr Resendiz of pt recent resp infection and completion of antx/steriods. Dr Resendiz added to consent for drainage right knee

## 2017-03-22 NOTE — BRIEF OP NOTE
LUMBAR DISCECTOMY POSTERIOR WITH METRIX  Procedure Note    Milton Figuerao  3/22/2017    Pre-op Diagnosis:   Lumbar stenosis L4-5    Post-op Diagnosis:     same    Procedure/CPT® Codes:      Procedure(s):  BILATERAL L 4-5 MICROLAMINECTOMY WITH METRIX   ARTHROCENTESIS ASPIRATION RIGHT KNEE     Surgeon(s):  DO Bennett Edwards DO    Anesthesia: General    Staff:   Circulator: Jade Nielson RN  Radiology Technologist: Millie Israel RRT  Scrub Person: Lise Whitehead  Assistant: LUPE Palmer    Estimated Blood Loss: 20 mL  Urine Voided: * No values recorded between 3/22/2017 10:12 AM and 3/22/2017 11:40 AM *    Specimens:                * No specimens in log *      Drains:           Findings: Severe spinal stenosis      Complications: None apparent        Bennett Resendiz DO     Date: 3/22/2017  Time: 11:44 AM

## 2017-03-22 NOTE — OP NOTE
DATE OF PROCEDURE:  03/22/2017     PREOPERATIVE DIAGNOSES:  1. Lumbar spinal stenosis, L4-L5.  2. Bilateral lumbar radiculopathy.     POSTOPERATIVE DIAGNOSES:  1. Lumbar spinal stenosis, L4-L5.  2. Bilateral lumbar radiculopathy.      PROCEDURES PERFORMED:   1. Bilateral microlaminectomy, partial facetectomy, foraminotomy for decompression of the L4 nerve roots.    2. Bilateral microlaminectomy, partial facetectomy, foraminotomy for decompression of the bilateral L5 nerve roots.     SURGEON: Bennett Resendiz DO     ASSISTANT: Kia Wheatley PA-C. Please note as part of the procedure I utilized the services of an assistant, specifically, Kia Wheatley PA-C. Kia Wheatley participated in crucial portions of the operation. The use of Kia Wheatley greatly reduced overall operative time, thereby reducing overall morbidity for the patient.     ANESTHESIA: General.     ESTIMATED BLOOD LOSS: 25 mL     COMPLICATIONS: None apparent.     DISPOSITION: Patient to recovery room in stable condition.     INDICATIONS: The patient is a 61-year-old who has been suffering from back and bilateral leg pain, unresponsive to conservative treatment. His MRI showed severe stenosis at L4-L5. I recommended microlaminectomy for treatment. I explained the risks of surgery including but not limited to bleeding, infection, risk of anesthesia, blood clots, pulmonary embolus, myocardial infarction, stroke, nerve root damage causing complete or partial paralysis, dural tear causing spinal headache, risk of postlaminectomy syndrome, need for further surgery, lack of guarantee, continued pain, and continued numbness. He had many questions about these risks, all of which were answered to the best of my ability in a language which he could understand. Informed consent was obtained. The patient presents today for microlaminectomy, L4-L5.     DESCRIPTION OF PROCEDURE: After identifying the patient in the preop holding area, all labs and consents were  found to be in order. MICHELLE hose and SCDs were applied for thromboembolic prophylaxis. His back was marked with an indelible marker. He was transferred to the operative suite. In the OR he was given the benefit of general anesthesia. He was carefully positioned prone on the Jonny table. All bony prominences were padded. Vancomycin was administered 60 minutes prior to incision. His back was prepped and draped in the usual sterile fashion. Timeout was performed. This was followed by use of fluoroscopy to identify the L4-L5 interspace on the skin. I then made a 1.5 cm incision after timeout with a #10 blade. I then dissected with the Bovie to the fascia, which I incised in line with the incision. I then placed METRx dilators and placed an 8 x 18 mm METRx tube and affixed it to the table. I brought in the operating microscope for microsurgical technique and dissection. I removed some overlying muscle and identified the lamina/spinous process interface. I started drilling this with a high-speed bur. I then reached a level of the ligamentum where I started opening my laminotomy and releasing the ligamentum with curettes and removing it with pituitary and Kerrisons. I then proceeded to decompress the lateral recess on the left-hand side, where his symptoms were more predominant. This included undercutting the lamina and facet joint and removing extremely thickened ligamentum flavum, especially around the L5 nerve traversing and entering into the foramen. I first decompressed the L4 exiting nerve by decompressing the lamina in the upper left corner of my surgical field and undercutting the entry for the L4 nerve root and assessing its freedom with a Devin probe. I then angled my tube towards the L5-S1 level, where I completed removal of the very thickened ligament and performed partial facetectomy and foraminotomy for the exiting L5 nerve into its foramen. I assessed the freedom with the ball-tip probe. I then angled my  tube towards the contralateral side and performed the same procedure for the right-sided L4 and L5 nerve roots. The patient had similar pathology on this side with perhaps less ligamentum thickening. Once the L4 and L5 nerves were found to be freed up as well as the midline thecal sac, I irrigated. I achieved hemostasis. I removed the retractor and closed the wound in a layered fashion. Sterile dressings were applied. The patient was awakened from general anesthesia, transferred to a hospital bed, taken to recovery in stable condition.     DISPOSITION: The patient will be discharged home if he meets criteria. He will be given pain medicine, _____ and follow up in 2 weeks.         Bennett Resendiz D.O.  VV:ana  D:   03/22/2017 11:50:00  T:   03/22/2017 13:59:20  Job ID:   [id number]  Document ID:   [id number]  cc:

## 2017-03-22 NOTE — ANESTHESIA PROCEDURE NOTES
Airway  Date/Time: 3/22/2017 10:18 AM  Airway not difficult    General Information and Staff    Patient location during procedure: OR  Anesthesiologist: SHAMAR FLOWERS  CRNA: BRIAN SWIFT    Indications and Patient Condition  Indications for airway management: airway protection    Preoxygenated: yes  Mask difficulty assessment: 1 - vent by mask    Final Airway Details  Final airway type: endotracheal airway      Successful airway: ETT  Cuffed: yes   Successful intubation technique: direct laryngoscopy  Endotracheal tube insertion site: oral  Blade: Fantasma  Blade size: #4  ETT size: 7.5 mm  Cormack-Lehane Classification: grade I - full view of glottis  Placement verified by: chest auscultation and capnometry   Measured from: lips  ETT to lips (cm): 22  Number of attempts at approach: 1    Additional Comments  Pre 02, sivi,, easy bvm, dlx1, dvvc, intubation x 1 attempt, +etc02, +bebs, appears atraumatic, teeth unchanged

## 2017-03-22 NOTE — PLAN OF CARE
Problem: Patient Care Overview (Adult)  Goal: Plan of Care Review  Outcome: Ongoing (interventions implemented as appropriate)    03/22/17 0910   Coping/Psychosocial Response Interventions   Plan Of Care Reviewed With patient   Patient Care Overview   Progress no change       Goal: Adult Individualization and Mutuality  Outcome: Ongoing (interventions implemented as appropriate)    03/22/17 0910   Individualization   Patient Specific Preferences na       Goal: Discharge Needs Assessment  Outcome: Ongoing (interventions implemented as appropriate)    03/22/17 0910   Discharge Needs Assessment   Concerns To Be Addressed no discharge needs identified;denies needs/concerns at this time   Concerns Comments jaimie 8 yrs old lives with him   Discharge Planning Comments radha do(friend) will take home and b with pt next 24 houors         Problem: Perioperative Period (Adult)  Goal: Signs and Symptoms of Listed Potential Problems Will be Absent or Manageable (Perioperative Period)  Outcome: Ongoing (interventions implemented as appropriate)    03/22/17 0910   Perioperative Period   Problems Assessed (Perioperative Period) pain;wound complications;embolism;hemorrhage;hypothermia;hypoxia/hypoxemia;infection;physiologic stress response   Problems Present (Perioperative Period) pain

## 2017-05-09 ENCOUNTER — HOSPITAL ENCOUNTER (EMERGENCY)
Facility: HOSPITAL | Age: 62
Discharge: HOME | End: 2017-05-09
Attending: EMERGENCY MEDICINE | Admitting: EMERGENCY MEDICINE

## 2017-05-09 VITALS
SYSTOLIC BLOOD PRESSURE: 174 MMHG | WEIGHT: 220 LBS | OXYGEN SATURATION: 98 % | HEIGHT: 68 IN | DIASTOLIC BLOOD PRESSURE: 87 MMHG | HEART RATE: 63 BPM | RESPIRATION RATE: 18 BRPM | BODY MASS INDEX: 33.34 KG/M2 | TEMPERATURE: 97.8 F

## 2017-05-09 DIAGNOSIS — M54.9 BACK PAIN WITH SCIATICA: Primary | ICD-10-CM

## 2017-05-09 DIAGNOSIS — M54.30 BACK PAIN WITH SCIATICA: Primary | ICD-10-CM

## 2017-05-09 PROCEDURE — 25010000002 PROMETHAZINE PER 50 MG: Performed by: EMERGENCY MEDICINE

## 2017-05-09 PROCEDURE — 96372 THER/PROPH/DIAG INJ SC/IM: CPT

## 2017-05-09 PROCEDURE — 99283 EMERGENCY DEPT VISIT LOW MDM: CPT

## 2017-05-09 PROCEDURE — 25010000002 HYDROMORPHONE PER 4 MG: Performed by: EMERGENCY MEDICINE

## 2017-05-09 RX ORDER — OXYCODONE HYDROCHLORIDE AND ACETAMINOPHEN 5; 325 MG/1; MG/1
1 TABLET ORAL EVERY 6 HOURS PRN
Qty: 20 TABLET | Refills: 0 | Status: SHIPPED | OUTPATIENT
Start: 2017-05-09 | End: 2017-05-14 | Stop reason: HOSPADM

## 2017-05-09 RX ORDER — HYDROMORPHONE HCL 110MG/55ML
2 PATIENT CONTROLLED ANALGESIA SYRINGE INTRAVENOUS ONCE
Status: DISCONTINUED | OUTPATIENT
Start: 2017-05-09 | End: 2017-05-09

## 2017-05-09 RX ORDER — OXYCODONE HYDROCHLORIDE AND ACETAMINOPHEN 5; 325 MG/1; MG/1
1 TABLET ORAL EVERY 6 HOURS PRN
Qty: 20 TABLET | Refills: 0 | Status: SHIPPED | OUTPATIENT
Start: 2017-05-09 | End: 2017-05-09

## 2017-05-09 RX ORDER — FLECAINIDE ACETATE 50 MG/1
50 TABLET ORAL EVERY 12 HOURS
COMMUNITY
End: 2019-05-13 | Stop reason: HOSPADM

## 2017-05-09 RX ORDER — PREDNISONE 50 MG/1
50 TABLET ORAL DAILY
Qty: 5 TABLET | Refills: 0 | Status: SHIPPED | OUTPATIENT
Start: 2017-05-09 | End: 2019-05-13 | Stop reason: HOSPADM

## 2017-05-09 RX ORDER — NAPROXEN 500 MG/1
500 TABLET ORAL 2 TIMES DAILY PRN
COMMUNITY
End: 2019-05-13 | Stop reason: HOSPADM

## 2017-05-09 RX ORDER — GABAPENTIN 300 MG/1
300 CAPSULE ORAL 3 TIMES DAILY
COMMUNITY
End: 2019-05-13 | Stop reason: HOSPADM

## 2017-05-09 RX ORDER — PROMETHAZINE HYDROCHLORIDE 25 MG/ML
25 INJECTION, SOLUTION INTRAMUSCULAR; INTRAVENOUS ONCE
Status: COMPLETED | OUTPATIENT
Start: 2017-05-09 | End: 2017-05-09

## 2017-05-09 RX ADMIN — HYDROMORPHONE HYDROCHLORIDE 1 MG: 2 INJECTION INTRAMUSCULAR; INTRAVENOUS; SUBCUTANEOUS at 16:53

## 2017-05-09 RX ADMIN — PROMETHAZINE HYDROCHLORIDE 25 MG: 25 INJECTION INTRAMUSCULAR; INTRAVENOUS at 14:59

## 2017-05-09 RX ADMIN — HYDROMORPHONE HYDROCHLORIDE 1 MG: 2 INJECTION INTRAMUSCULAR; INTRAVENOUS; SUBCUTANEOUS at 15:01

## 2017-05-11 ENCOUNTER — HOSPITAL ENCOUNTER (OUTPATIENT)
Facility: HOSPITAL | Age: 62
Setting detail: OBSERVATION
Discharge: HOME | End: 2017-05-14
Attending: ORTHOPAEDIC SURGERY | Admitting: ORTHOPAEDIC SURGERY

## 2017-05-11 DIAGNOSIS — R26.2 DIFFICULTY WALKING: Primary | ICD-10-CM

## 2017-05-11 PROCEDURE — 25010000002 HYDROMORPHONE PER 4 MG: Performed by: ORTHOPAEDIC SURGERY

## 2017-05-11 PROCEDURE — G0378 HOSPITAL OBSERVATION PER HR: HCPCS

## 2017-05-11 RX ORDER — HYDRALAZINE HYDROCHLORIDE 25 MG/1
25 TABLET, FILM COATED ORAL EVERY 12 HOURS SCHEDULED
Status: DISCONTINUED | OUTPATIENT
Start: 2017-05-11 | End: 2017-05-12

## 2017-05-11 RX ORDER — HYDROMORPHONE HYDROCHLORIDE 1 MG/ML
0.5 INJECTION, SOLUTION INTRAMUSCULAR; INTRAVENOUS; SUBCUTANEOUS
Status: DISCONTINUED | OUTPATIENT
Start: 2017-05-11 | End: 2017-05-12

## 2017-05-11 RX ORDER — GABAPENTIN 300 MG/1
300 CAPSULE ORAL 3 TIMES DAILY
Status: DISCONTINUED | OUTPATIENT
Start: 2017-05-11 | End: 2017-05-12

## 2017-05-11 RX ORDER — ONDANSETRON 2 MG/ML
4 INJECTION INTRAMUSCULAR; INTRAVENOUS EVERY 6 HOURS PRN
Status: DISCONTINUED | OUTPATIENT
Start: 2017-05-11 | End: 2017-05-12

## 2017-05-11 RX ORDER — OXYCODONE AND ACETAMINOPHEN 7.5; 325 MG/1; MG/1
2 TABLET ORAL EVERY 4 HOURS PRN
Status: DISCONTINUED | OUTPATIENT
Start: 2017-05-11 | End: 2017-05-14 | Stop reason: HOSPADM

## 2017-05-11 RX ORDER — DEXTROSE AND SODIUM CHLORIDE 5; .45 G/100ML; G/100ML
125 INJECTION, SOLUTION INTRAVENOUS CONTINUOUS
Status: DISCONTINUED | OUTPATIENT
Start: 2017-05-12 | End: 2017-05-12

## 2017-05-11 RX ORDER — OXYCODONE AND ACETAMINOPHEN 7.5; 325 MG/1; MG/1
1 TABLET ORAL EVERY 4 HOURS PRN
Status: DISCONTINUED | OUTPATIENT
Start: 2017-05-11 | End: 2017-05-14 | Stop reason: HOSPADM

## 2017-05-11 RX ADMIN — HYDROMORPHONE HYDROCHLORIDE 0.5 MG: 1 INJECTION, SOLUTION INTRAMUSCULAR; INTRAVENOUS; SUBCUTANEOUS at 22:37

## 2017-05-11 RX ADMIN — DEXTROSE AND SODIUM CHLORIDE 125 ML/HR: 5; 450 INJECTION, SOLUTION INTRAVENOUS at 23:59

## 2017-05-12 ENCOUNTER — APPOINTMENT (OUTPATIENT)
Dept: GENERAL RADIOLOGY | Facility: HOSPITAL | Age: 62
End: 2017-05-12

## 2017-05-12 ENCOUNTER — ANESTHESIA EVENT (OUTPATIENT)
Dept: PERIOP | Facility: HOSPITAL | Age: 62
End: 2017-05-12

## 2017-05-12 ENCOUNTER — ANESTHESIA (OUTPATIENT)
Dept: PERIOP | Facility: HOSPITAL | Age: 62
End: 2017-05-12

## 2017-05-12 PROBLEM — J45.909 ASTHMA: Status: ACTIVE | Noted: 2017-05-12

## 2017-05-12 PROBLEM — M54.9 BACK PAIN: Status: ACTIVE | Noted: 2017-05-12

## 2017-05-12 PROBLEM — B19.20 HEPATITIS C: Status: ACTIVE | Noted: 2017-05-12

## 2017-05-12 PROBLEM — I10 HTN (HYPERTENSION): Status: ACTIVE | Noted: 2017-05-12

## 2017-05-12 LAB
ABO GROUP BLD: NORMAL
ANION GAP SERPL CALCULATED.3IONS-SCNC: 10.4 MMOL/L
BASOPHILS # BLD AUTO: 0.01 10*3/MM3 (ref 0–0.2)
BASOPHILS NFR BLD AUTO: 0.2 % (ref 0–1.5)
BLD GP AB SCN SERPL QL: NEGATIVE
BUN BLD-MCNC: 12 MG/DL (ref 8–23)
BUN/CREAT SERPL: 11 (ref 7–25)
CALCIUM SPEC-SCNC: 8.7 MG/DL (ref 8.6–10.5)
CHLORIDE SERPL-SCNC: 102 MMOL/L (ref 98–107)
CO2 SERPL-SCNC: 24.6 MMOL/L (ref 22–29)
CREAT BLD-MCNC: 1.09 MG/DL (ref 0.76–1.27)
DEPRECATED RDW RBC AUTO: 44.5 FL (ref 37–54)
EOSINOPHIL # BLD AUTO: 0.18 10*3/MM3 (ref 0–0.7)
EOSINOPHIL NFR BLD AUTO: 3.9 % (ref 0.3–6.2)
ERYTHROCYTE [DISTWIDTH] IN BLOOD BY AUTOMATED COUNT: 13.3 % (ref 11.5–14.5)
GFR SERPL CREATININE-BSD FRML MDRD: 69 ML/MIN/1.73
GLUCOSE BLD-MCNC: 117 MG/DL (ref 65–99)
HCT VFR BLD AUTO: 32.5 % (ref 40.4–52.2)
HGB BLD-MCNC: 10.8 G/DL (ref 13.7–17.6)
IMM GRANULOCYTES # BLD: 0 10*3/MM3 (ref 0–0.03)
IMM GRANULOCYTES NFR BLD: 0 % (ref 0–0.5)
INR PPP: 0.99 (ref 0.9–1.1)
LYMPHOCYTES # BLD AUTO: 0.99 10*3/MM3 (ref 0.9–4.8)
LYMPHOCYTES NFR BLD AUTO: 21.3 % (ref 19.6–45.3)
MCH RBC QN AUTO: 30.4 PG (ref 27–32.7)
MCHC RBC AUTO-ENTMCNC: 33.2 G/DL (ref 32.6–36.4)
MCV RBC AUTO: 91.5 FL (ref 79.8–96.2)
MONOCYTES # BLD AUTO: 0.64 10*3/MM3 (ref 0.2–1.2)
MONOCYTES NFR BLD AUTO: 13.8 % (ref 5–12)
NEUTROPHILS # BLD AUTO: 2.83 10*3/MM3 (ref 1.9–8.1)
NEUTROPHILS NFR BLD AUTO: 60.8 % (ref 42.7–76)
PLATELET # BLD AUTO: 229 10*3/MM3 (ref 140–500)
PMV BLD AUTO: 9.5 FL (ref 6–12)
POTASSIUM BLD-SCNC: 4.7 MMOL/L (ref 3.5–5.2)
PROTHROMBIN TIME: 12.7 SECONDS (ref 11.7–14.2)
RBC # BLD AUTO: 3.55 10*6/MM3 (ref 4.6–6)
RH BLD: POSITIVE
SODIUM BLD-SCNC: 137 MMOL/L (ref 136–145)
WBC NRBC COR # BLD: 4.65 10*3/MM3 (ref 4.5–10.7)

## 2017-05-12 PROCEDURE — 80048 BASIC METABOLIC PNL TOTAL CA: CPT | Performed by: ORTHOPAEDIC SURGERY

## 2017-05-12 PROCEDURE — 85025 COMPLETE CBC W/AUTO DIFF WBC: CPT | Performed by: ORTHOPAEDIC SURGERY

## 2017-05-12 PROCEDURE — C1713 ANCHOR/SCREW BN/BN,TIS/BN: HCPCS | Performed by: ORTHOPAEDIC SURGERY

## 2017-05-12 PROCEDURE — 25010000002 SUCCINYLCHOLINE PER 20 MG: Performed by: NURSE ANESTHETIST, CERTIFIED REGISTERED

## 2017-05-12 PROCEDURE — 86901 BLOOD TYPING SEROLOGIC RH(D): CPT | Performed by: ORTHOPAEDIC SURGERY

## 2017-05-12 PROCEDURE — 25010000002 FENTANYL CITRATE (PF) 100 MCG/2ML SOLUTION: Performed by: NURSE ANESTHETIST, CERTIFIED REGISTERED

## 2017-05-12 PROCEDURE — 86850 RBC ANTIBODY SCREEN: CPT | Performed by: ORTHOPAEDIC SURGERY

## 2017-05-12 PROCEDURE — 72100 X-RAY EXAM L-S SPINE 2/3 VWS: CPT

## 2017-05-12 PROCEDURE — 25010000002 FENTANYL CITRATE (PF) 100 MCG/2ML SOLUTION: Performed by: ANESTHESIOLOGY

## 2017-05-12 PROCEDURE — 25010000002 NEOSTIGMINE 10 MG/10ML SOLUTION: Performed by: NURSE ANESTHETIST, CERTIFIED REGISTERED

## 2017-05-12 PROCEDURE — G0378 HOSPITAL OBSERVATION PER HR: HCPCS

## 2017-05-12 PROCEDURE — 25010000002 PROPOFOL 10 MG/ML EMULSION: Performed by: NURSE ANESTHETIST, CERTIFIED REGISTERED

## 2017-05-12 PROCEDURE — 25010000002 VANCOMYCIN: Performed by: ORTHOPAEDIC SURGERY

## 2017-05-12 PROCEDURE — 25010000002 MIDAZOLAM PER 1 MG: Performed by: NURSE ANESTHETIST, CERTIFIED REGISTERED

## 2017-05-12 PROCEDURE — 25010000002 HYDROMORPHONE PER 4 MG: Performed by: ANESTHESIOLOGY

## 2017-05-12 PROCEDURE — 25010000002 HYDROMORPHONE PER 4 MG: Performed by: ORTHOPAEDIC SURGERY

## 2017-05-12 PROCEDURE — 25010000002 HYDRALAZINE PER 20 MG: Performed by: ANESTHESIOLOGY

## 2017-05-12 PROCEDURE — 25010000002 MIDAZOLAM PER 1 MG: Performed by: ANESTHESIOLOGY

## 2017-05-12 PROCEDURE — 86900 BLOOD TYPING SEROLOGIC ABO: CPT | Performed by: ORTHOPAEDIC SURGERY

## 2017-05-12 PROCEDURE — 25010000002 HYDROMORPHONE PER 4 MG: Performed by: NURSE ANESTHETIST, CERTIFIED REGISTERED

## 2017-05-12 PROCEDURE — 25010000002 ONDANSETRON PER 1 MG: Performed by: ORTHOPAEDIC SURGERY

## 2017-05-12 PROCEDURE — 85610 PROTHROMBIN TIME: CPT | Performed by: ORTHOPAEDIC SURGERY

## 2017-05-12 PROCEDURE — 63710000001 SIROLIMUS 0.5 MG TABLET: Performed by: ORTHOPAEDIC SURGERY

## 2017-05-12 PROCEDURE — 25010000002 DEXAMETHASONE PER 1 MG: Performed by: ANESTHESIOLOGY

## 2017-05-12 PROCEDURE — 76000 FLUOROSCOPY <1 HR PHYS/QHP: CPT

## 2017-05-12 DEVICE — NOVEL SD, LARGE, 15MM, PEEK
Type: IMPLANTABLE DEVICE | Status: FUNCTIONAL
Brand: NOVEL SPINAL SPACER SYSTEM

## 2017-05-12 DEVICE — PRE-CONTOURED / C.P. TI ROD 5.5MM X 4CM
Type: IMPLANTABLE DEVICE | Status: FUNCTIONAL
Brand: ILLICO

## 2017-05-12 DEVICE — TITANIUM HEXALOBE SET SCREW
Type: IMPLANTABLE DEVICE | Status: FUNCTIONAL
Brand: ZODIAC

## 2017-05-12 DEVICE — PUTTY DBM PROGENIX PLS 10CC: Type: IMPLANTABLE DEVICE | Status: FUNCTIONAL

## 2017-05-12 DEVICE — TI CANNULATED POLYAXIAL SCREW 6.5MM X 40MM
Type: IMPLANTABLE DEVICE | Status: FUNCTIONAL
Brand: ILLICO

## 2017-05-12 DEVICE — IMPLANTABLE DEVICE: Type: IMPLANTABLE DEVICE | Status: FUNCTIONAL

## 2017-05-12 RX ORDER — HYDRALAZINE HYDROCHLORIDE 20 MG/ML
5 INJECTION INTRAMUSCULAR; INTRAVENOUS
Status: DISCONTINUED | OUTPATIENT
Start: 2017-05-12 | End: 2017-05-12 | Stop reason: HOSPADM

## 2017-05-12 RX ORDER — BISACODYL 5 MG/1
5 TABLET, DELAYED RELEASE ORAL DAILY PRN
Status: DISCONTINUED | OUTPATIENT
Start: 2017-05-12 | End: 2017-05-14 | Stop reason: HOSPADM

## 2017-05-12 RX ORDER — BUPIVACAINE HYDROCHLORIDE AND EPINEPHRINE 5; 5 MG/ML; UG/ML
INJECTION, SOLUTION PERINEURAL AS NEEDED
Status: DISCONTINUED | OUTPATIENT
Start: 2017-05-12 | End: 2017-05-12 | Stop reason: HOSPADM

## 2017-05-12 RX ORDER — FENTANYL CITRATE 50 UG/ML
50 INJECTION, SOLUTION INTRAMUSCULAR; INTRAVENOUS
Status: COMPLETED | OUTPATIENT
Start: 2017-05-12 | End: 2017-05-12

## 2017-05-12 RX ORDER — SPIRONOLACTONE 100 MG/1
100 TABLET, FILM COATED ORAL EVERY MORNING
Status: DISCONTINUED | OUTPATIENT
Start: 2017-05-13 | End: 2017-05-13

## 2017-05-12 RX ORDER — HYDRALAZINE HYDROCHLORIDE 20 MG/ML
INJECTION INTRAMUSCULAR; INTRAVENOUS AS NEEDED
Status: DISCONTINUED | OUTPATIENT
Start: 2017-05-12 | End: 2017-05-12 | Stop reason: SURG

## 2017-05-12 RX ORDER — ONDANSETRON 2 MG/ML
4 INJECTION INTRAMUSCULAR; INTRAVENOUS EVERY 6 HOURS PRN
Status: DISCONTINUED | OUTPATIENT
Start: 2017-05-12 | End: 2017-05-14 | Stop reason: HOSPADM

## 2017-05-12 RX ORDER — SODIUM CHLORIDE 0.9 % (FLUSH) 0.9 %
1-10 SYRINGE (ML) INJECTION AS NEEDED
Status: DISCONTINUED | OUTPATIENT
Start: 2017-05-12 | End: 2017-05-12 | Stop reason: HOSPADM

## 2017-05-12 RX ORDER — SUCCINYLCHOLINE CHLORIDE 20 MG/ML
INJECTION INTRAMUSCULAR; INTRAVENOUS AS NEEDED
Status: DISCONTINUED | OUTPATIENT
Start: 2017-05-12 | End: 2017-05-12 | Stop reason: SURG

## 2017-05-12 RX ORDER — HYDRALAZINE HYDROCHLORIDE 25 MG/1
25 TABLET, FILM COATED ORAL 2 TIMES DAILY
Status: DISCONTINUED | OUTPATIENT
Start: 2017-05-12 | End: 2017-05-14 | Stop reason: HOSPADM

## 2017-05-12 RX ORDER — DIPHENHYDRAMINE HYDROCHLORIDE 50 MG/ML
12.5 INJECTION INTRAMUSCULAR; INTRAVENOUS
Status: DISCONTINUED | OUTPATIENT
Start: 2017-05-12 | End: 2017-05-12 | Stop reason: HOSPADM

## 2017-05-12 RX ORDER — SIROLIMUS 1 MG/1
2 TABLET, FILM COATED ORAL EVERY MORNING
Status: DISCONTINUED | OUTPATIENT
Start: 2017-05-13 | End: 2017-05-12

## 2017-05-12 RX ORDER — GABAPENTIN 300 MG/1
300 CAPSULE ORAL 3 TIMES DAILY
Status: DISCONTINUED | OUTPATIENT
Start: 2017-05-12 | End: 2017-05-14 | Stop reason: HOSPADM

## 2017-05-12 RX ORDER — OXYCODONE AND ACETAMINOPHEN 7.5; 325 MG/1; MG/1
1 TABLET ORAL ONCE AS NEEDED
Status: DISCONTINUED | OUTPATIENT
Start: 2017-05-12 | End: 2017-05-12 | Stop reason: HOSPADM

## 2017-05-12 RX ORDER — HYDROMORPHONE HCL 110MG/55ML
PATIENT CONTROLLED ANALGESIA SYRINGE INTRAVENOUS AS NEEDED
Status: DISCONTINUED | OUTPATIENT
Start: 2017-05-12 | End: 2017-05-12 | Stop reason: SURG

## 2017-05-12 RX ORDER — ROCURONIUM BROMIDE 10 MG/ML
INJECTION, SOLUTION INTRAVENOUS AS NEEDED
Status: DISCONTINUED | OUTPATIENT
Start: 2017-05-12 | End: 2017-05-12 | Stop reason: SURG

## 2017-05-12 RX ORDER — ALBUTEROL SULFATE 2.5 MG/3ML
2.5 SOLUTION RESPIRATORY (INHALATION) EVERY 4 HOURS PRN
Status: DISCONTINUED | OUTPATIENT
Start: 2017-05-12 | End: 2017-05-14 | Stop reason: HOSPADM

## 2017-05-12 RX ORDER — HYDROMORPHONE HCL IN 0.9% NACL 10 MG/50ML
PATIENT CONTROLLED ANALGESIA SYRINGE INTRAVENOUS CONTINUOUS
Status: ACTIVE | OUTPATIENT
Start: 2017-05-12 | End: 2017-05-13

## 2017-05-12 RX ORDER — HYDROMORPHONE HYDROCHLORIDE 1 MG/ML
0.5 INJECTION, SOLUTION INTRAMUSCULAR; INTRAVENOUS; SUBCUTANEOUS
Status: COMPLETED | OUTPATIENT
Start: 2017-05-12 | End: 2017-05-12

## 2017-05-12 RX ORDER — PROMETHAZINE HYDROCHLORIDE 25 MG/1
25 TABLET ORAL EVERY 6 HOURS PRN
Status: DISCONTINUED | OUTPATIENT
Start: 2017-05-12 | End: 2017-05-14 | Stop reason: HOSPADM

## 2017-05-12 RX ORDER — PROPOFOL 10 MG/ML
VIAL (ML) INTRAVENOUS AS NEEDED
Status: DISCONTINUED | OUTPATIENT
Start: 2017-05-12 | End: 2017-05-12 | Stop reason: SURG

## 2017-05-12 RX ORDER — NALOXONE HCL 0.4 MG/ML
0.1 VIAL (ML) INJECTION
Status: DISCONTINUED | OUTPATIENT
Start: 2017-05-12 | End: 2017-05-14 | Stop reason: HOSPADM

## 2017-05-12 RX ORDER — PROMETHAZINE HYDROCHLORIDE 25 MG/ML
12.5 INJECTION, SOLUTION INTRAMUSCULAR; INTRAVENOUS ONCE AS NEEDED
Status: DISCONTINUED | OUTPATIENT
Start: 2017-05-12 | End: 2017-05-12 | Stop reason: HOSPADM

## 2017-05-12 RX ORDER — MIDAZOLAM HYDROCHLORIDE 1 MG/ML
1 INJECTION INTRAMUSCULAR; INTRAVENOUS
Status: DISCONTINUED | OUTPATIENT
Start: 2017-05-12 | End: 2017-05-12 | Stop reason: HOSPADM

## 2017-05-12 RX ORDER — MIDAZOLAM HYDROCHLORIDE 1 MG/ML
INJECTION INTRAMUSCULAR; INTRAVENOUS AS NEEDED
Status: DISCONTINUED | OUTPATIENT
Start: 2017-05-12 | End: 2017-05-12 | Stop reason: SURG

## 2017-05-12 RX ORDER — HYDROMORPHONE HCL IN 0.9% NACL 10 MG/50ML
PATIENT CONTROLLED ANALGESIA SYRINGE INTRAVENOUS
Status: COMPLETED
Start: 2017-05-12 | End: 2017-05-12

## 2017-05-12 RX ORDER — DOCUSATE SODIUM 100 MG/1
100 CAPSULE, LIQUID FILLED ORAL 2 TIMES DAILY PRN
Status: DISCONTINUED | OUTPATIENT
Start: 2017-05-12 | End: 2017-05-14 | Stop reason: HOSPADM

## 2017-05-12 RX ORDER — SODIUM CHLORIDE 450 MG/100ML
100 INJECTION, SOLUTION INTRAVENOUS CONTINUOUS
Status: DISCONTINUED | OUTPATIENT
Start: 2017-05-12 | End: 2017-05-14 | Stop reason: HOSPADM

## 2017-05-12 RX ORDER — FENTANYL CITRATE 50 UG/ML
25 INJECTION, SOLUTION INTRAMUSCULAR; INTRAVENOUS
Status: DISCONTINUED | OUTPATIENT
Start: 2017-05-12 | End: 2017-05-12 | Stop reason: HOSPADM

## 2017-05-12 RX ORDER — SODIUM CHLORIDE 0.9 % (FLUSH) 0.9 %
1-10 SYRINGE (ML) INJECTION AS NEEDED
Status: DISCONTINUED | OUTPATIENT
Start: 2017-05-12 | End: 2017-05-14 | Stop reason: HOSPADM

## 2017-05-12 RX ORDER — SODIUM CHLORIDE, SODIUM LACTATE, POTASSIUM CHLORIDE, CALCIUM CHLORIDE 600; 310; 30; 20 MG/100ML; MG/100ML; MG/100ML; MG/100ML
9 INJECTION, SOLUTION INTRAVENOUS CONTINUOUS PRN
Status: DISCONTINUED | OUTPATIENT
Start: 2017-05-12 | End: 2017-05-12 | Stop reason: HOSPADM

## 2017-05-12 RX ORDER — LEVOTHYROXINE SODIUM 0.03 MG/1
25 TABLET ORAL EVERY MORNING
Status: DISCONTINUED | OUTPATIENT
Start: 2017-05-13 | End: 2017-05-14 | Stop reason: HOSPADM

## 2017-05-12 RX ORDER — GLYCOPYRROLATE 0.2 MG/ML
INJECTION INTRAMUSCULAR; INTRAVENOUS AS NEEDED
Status: DISCONTINUED | OUTPATIENT
Start: 2017-05-12 | End: 2017-05-12 | Stop reason: SURG

## 2017-05-12 RX ORDER — MIDAZOLAM HYDROCHLORIDE 1 MG/ML
2 INJECTION INTRAMUSCULAR; INTRAVENOUS
Status: DISCONTINUED | OUTPATIENT
Start: 2017-05-12 | End: 2017-05-12 | Stop reason: HOSPADM

## 2017-05-12 RX ORDER — SENNA AND DOCUSATE SODIUM 50; 8.6 MG/1; MG/1
1 TABLET, FILM COATED ORAL NIGHTLY PRN
Status: DISCONTINUED | OUTPATIENT
Start: 2017-05-12 | End: 2017-05-14 | Stop reason: HOSPADM

## 2017-05-12 RX ORDER — DEXAMETHASONE SODIUM PHOSPHATE 10 MG/ML
INJECTION INTRAMUSCULAR; INTRAVENOUS AS NEEDED
Status: DISCONTINUED | OUTPATIENT
Start: 2017-05-12 | End: 2017-05-12 | Stop reason: SURG

## 2017-05-12 RX ORDER — SIROLIMUS 0.5 MG/1
2 TABLET, FILM COATED ORAL EVERY MORNING
Status: DISCONTINUED | OUTPATIENT
Start: 2017-05-13 | End: 2017-05-14 | Stop reason: HOSPADM

## 2017-05-12 RX ORDER — FLUMAZENIL 0.1 MG/ML
0.2 INJECTION INTRAVENOUS AS NEEDED
Status: DISCONTINUED | OUTPATIENT
Start: 2017-05-12 | End: 2017-05-12 | Stop reason: HOSPADM

## 2017-05-12 RX ORDER — HYDROCODONE BITARTRATE AND ACETAMINOPHEN 7.5; 325 MG/1; MG/1
1 TABLET ORAL ONCE AS NEEDED
Status: DISCONTINUED | OUTPATIENT
Start: 2017-05-12 | End: 2017-05-12 | Stop reason: HOSPADM

## 2017-05-12 RX ORDER — FENTANYL CITRATE 50 UG/ML
INJECTION, SOLUTION INTRAMUSCULAR; INTRAVENOUS AS NEEDED
Status: DISCONTINUED | OUTPATIENT
Start: 2017-05-12 | End: 2017-05-12 | Stop reason: SURG

## 2017-05-12 RX ORDER — PANTOPRAZOLE SODIUM 40 MG/1
40 TABLET, DELAYED RELEASE ORAL
Status: DISCONTINUED | OUTPATIENT
Start: 2017-05-13 | End: 2017-05-14 | Stop reason: HOSPADM

## 2017-05-12 RX ORDER — SIROLIMUS 0.5 MG/1
2 TABLET, FILM COATED ORAL EVERY MORNING
Status: DISCONTINUED | OUTPATIENT
Start: 2017-05-12 | End: 2017-05-12

## 2017-05-12 RX ORDER — FLECAINIDE ACETATE 50 MG/1
50 TABLET ORAL EVERY 12 HOURS
Status: DISCONTINUED | OUTPATIENT
Start: 2017-05-12 | End: 2017-05-14 | Stop reason: HOSPADM

## 2017-05-12 RX ORDER — FAMOTIDINE 10 MG/ML
20 INJECTION, SOLUTION INTRAVENOUS
Status: DISCONTINUED | OUTPATIENT
Start: 2017-05-12 | End: 2017-05-12 | Stop reason: HOSPADM

## 2017-05-12 RX ORDER — LIDOCAINE HYDROCHLORIDE 20 MG/ML
INJECTION, SOLUTION INFILTRATION; PERINEURAL AS NEEDED
Status: DISCONTINUED | OUTPATIENT
Start: 2017-05-12 | End: 2017-05-12 | Stop reason: SURG

## 2017-05-12 RX ORDER — PROMETHAZINE HYDROCHLORIDE 25 MG/1
12.5 TABLET ORAL ONCE AS NEEDED
Status: DISCONTINUED | OUTPATIENT
Start: 2017-05-12 | End: 2017-05-12 | Stop reason: HOSPADM

## 2017-05-12 RX ORDER — PREDNISONE 50 MG/1
50 TABLET ORAL DAILY
Status: DISCONTINUED | OUTPATIENT
Start: 2017-05-12 | End: 2017-05-14 | Stop reason: HOSPADM

## 2017-05-12 RX ORDER — ONDANSETRON 4 MG/1
4 TABLET, ORALLY DISINTEGRATING ORAL EVERY 6 HOURS PRN
Status: DISCONTINUED | OUTPATIENT
Start: 2017-05-12 | End: 2017-05-14 | Stop reason: HOSPADM

## 2017-05-12 RX ORDER — CYCLOBENZAPRINE HCL 10 MG
10 TABLET ORAL EVERY 8 HOURS SCHEDULED
Status: DISCONTINUED | OUTPATIENT
Start: 2017-05-12 | End: 2017-05-14 | Stop reason: HOSPADM

## 2017-05-12 RX ORDER — BISACODYL 10 MG
10 SUPPOSITORY, RECTAL RECTAL DAILY PRN
Status: DISCONTINUED | OUTPATIENT
Start: 2017-05-12 | End: 2017-05-14 | Stop reason: HOSPADM

## 2017-05-12 RX ORDER — ONDANSETRON 2 MG/ML
4 INJECTION INTRAMUSCULAR; INTRAVENOUS ONCE AS NEEDED
Status: DISCONTINUED | OUTPATIENT
Start: 2017-05-12 | End: 2017-05-12 | Stop reason: HOSPADM

## 2017-05-12 RX ORDER — NEOSTIGMINE METHYLSULFATE 1 MG/ML
INJECTION, SOLUTION INTRAVENOUS AS NEEDED
Status: DISCONTINUED | OUTPATIENT
Start: 2017-05-12 | End: 2017-05-12 | Stop reason: SURG

## 2017-05-12 RX ORDER — PROMETHAZINE HYDROCHLORIDE 25 MG/1
25 SUPPOSITORY RECTAL ONCE AS NEEDED
Status: DISCONTINUED | OUTPATIENT
Start: 2017-05-12 | End: 2017-05-12 | Stop reason: HOSPADM

## 2017-05-12 RX ORDER — PROMETHAZINE HYDROCHLORIDE 25 MG/1
25 TABLET ORAL ONCE AS NEEDED
Status: DISCONTINUED | OUTPATIENT
Start: 2017-05-12 | End: 2017-05-12 | Stop reason: HOSPADM

## 2017-05-12 RX ORDER — NALOXONE HCL 0.4 MG/ML
0.2 VIAL (ML) INJECTION AS NEEDED
Status: DISCONTINUED | OUTPATIENT
Start: 2017-05-12 | End: 2017-05-12 | Stop reason: HOSPADM

## 2017-05-12 RX ORDER — LIDOCAINE HYDROCHLORIDE 40 MG/ML
SOLUTION TOPICAL AS NEEDED
Status: DISCONTINUED | OUTPATIENT
Start: 2017-05-12 | End: 2017-05-12 | Stop reason: SURG

## 2017-05-12 RX ORDER — FUROSEMIDE 40 MG/1
40 TABLET ORAL AS NEEDED
Status: DISCONTINUED | OUTPATIENT
Start: 2017-05-12 | End: 2017-05-14 | Stop reason: HOSPADM

## 2017-05-12 RX ORDER — ONDANSETRON 4 MG/1
4 TABLET, FILM COATED ORAL EVERY 6 HOURS PRN
Status: DISCONTINUED | OUTPATIENT
Start: 2017-05-12 | End: 2017-05-14 | Stop reason: HOSPADM

## 2017-05-12 RX ADMIN — FENTANYL CITRATE 100 MCG: 50 INJECTION, SOLUTION INTRAMUSCULAR; INTRAVENOUS at 13:49

## 2017-05-12 RX ADMIN — FENTANYL CITRATE 50 MCG: 50 INJECTION INTRAMUSCULAR; INTRAVENOUS at 18:20

## 2017-05-12 RX ADMIN — FENTANYL CITRATE 50 MCG: 50 INJECTION INTRAMUSCULAR; INTRAVENOUS at 18:09

## 2017-05-12 RX ADMIN — FENTANYL CITRATE 25 MCG: 50 INJECTION INTRAMUSCULAR; INTRAVENOUS at 11:17

## 2017-05-12 RX ADMIN — CYCLOBENZAPRINE HYDROCHLORIDE 10 MG: 10 TABLET, FILM COATED ORAL at 23:24

## 2017-05-12 RX ADMIN — MIDAZOLAM 1 MG: 1 INJECTION INTRAMUSCULAR; INTRAVENOUS at 12:18

## 2017-05-12 RX ADMIN — HYDROMORPHONE HYDROCHLORIDE 0.5 MG: 1 INJECTION, SOLUTION INTRAMUSCULAR; INTRAVENOUS; SUBCUTANEOUS at 08:56

## 2017-05-12 RX ADMIN — FAMOTIDINE 20 MG: 10 INJECTION, SOLUTION INTRAVENOUS at 11:18

## 2017-05-12 RX ADMIN — HYDROMORPHONE HYDROCHLORIDE 0.5 MG: 2 INJECTION, SOLUTION INTRAMUSCULAR; INTRAVENOUS; SUBCUTANEOUS at 16:53

## 2017-05-12 RX ADMIN — NEOSTIGMINE METHYLSULFATE 3 MG: 1 INJECTION INTRAVENOUS at 16:45

## 2017-05-12 RX ADMIN — SUCCINYLCHOLINE CHLORIDE 120 MG: 20 INJECTION, SOLUTION INTRAMUSCULAR; INTRAVENOUS; PARENTERAL at 13:58

## 2017-05-12 RX ADMIN — HYDRALAZINE HYDROCHLORIDE 25 MG: 25 TABLET, FILM COATED ORAL at 19:06

## 2017-05-12 RX ADMIN — SIROLIMUS 2 MG: 0.5 TABLET, SUGAR COATED ORAL at 10:59

## 2017-05-12 RX ADMIN — HYDROMORPHONE HYDROCHLORIDE 0.5 MG: 1 INJECTION, SOLUTION INTRAMUSCULAR; INTRAVENOUS; SUBCUTANEOUS at 02:08

## 2017-05-12 RX ADMIN — HYDROMORPHONE HYDROCHLORIDE 0.5 MG: 1 INJECTION, SOLUTION INTRAMUSCULAR; INTRAVENOUS; SUBCUTANEOUS at 04:53

## 2017-05-12 RX ADMIN — MIDAZOLAM 1 MG: 1 INJECTION INTRAMUSCULAR; INTRAVENOUS at 11:17

## 2017-05-12 RX ADMIN — PROPOFOL 200 MG: 10 INJECTION, EMULSION INTRAVENOUS at 13:58

## 2017-05-12 RX ADMIN — ONDANSETRON 4 MG: 2 INJECTION INTRAMUSCULAR; INTRAVENOUS at 16:46

## 2017-05-12 RX ADMIN — OXYCODONE HYDROCHLORIDE AND ACETAMINOPHEN 2 TABLET: 7.5; 325 TABLET ORAL at 00:00

## 2017-05-12 RX ADMIN — HYDROMORPHONE HYDROCHLORIDE 0.5 MG: 1 INJECTION, SOLUTION INTRAMUSCULAR; INTRAVENOUS; SUBCUTANEOUS at 17:42

## 2017-05-12 RX ADMIN — GABAPENTIN 300 MG: 300 CAPSULE ORAL at 23:24

## 2017-05-12 RX ADMIN — FENTANYL CITRATE 25 MCG: 50 INJECTION INTRAMUSCULAR; INTRAVENOUS at 12:18

## 2017-05-12 RX ADMIN — VANCOMYCIN HYDROCHLORIDE 1500 MG: 1 INJECTION, POWDER, LYOPHILIZED, FOR SOLUTION INTRAVENOUS at 23:25

## 2017-05-12 RX ADMIN — HYDROMORPHONE HYDROCHLORIDE 0.5 MG: 1 INJECTION, SOLUTION INTRAMUSCULAR; INTRAVENOUS; SUBCUTANEOUS at 17:54

## 2017-05-12 RX ADMIN — HYDROMORPHONE HYDROCHLORIDE 0.5 MG: 1 INJECTION, SOLUTION INTRAMUSCULAR; INTRAVENOUS; SUBCUTANEOUS at 06:52

## 2017-05-12 RX ADMIN — ROCURONIUM BROMIDE 10 MG: 10 INJECTION INTRAVENOUS at 13:58

## 2017-05-12 RX ADMIN — SODIUM CHLORIDE 100 ML/HR: 4.5 INJECTION, SOLUTION INTRAVENOUS at 19:07

## 2017-05-12 RX ADMIN — GLYCOPYRROLATE 0.4 MG: 0.2 INJECTION INTRAMUSCULAR; INTRAVENOUS at 16:45

## 2017-05-12 RX ADMIN — SODIUM CHLORIDE, POTASSIUM CHLORIDE, SODIUM LACTATE AND CALCIUM CHLORIDE: 600; 310; 30; 20 INJECTION, SOLUTION INTRAVENOUS at 13:46

## 2017-05-12 RX ADMIN — GLYCOPYRROLATE 0.2 MG: 0.2 INJECTION INTRAMUSCULAR; INTRAVENOUS at 15:12

## 2017-05-12 RX ADMIN — Medication: at 17:42

## 2017-05-12 RX ADMIN — FENTANYL CITRATE 50 MCG: 50 INJECTION, SOLUTION INTRAMUSCULAR; INTRAVENOUS at 14:30

## 2017-05-12 RX ADMIN — FENTANYL CITRATE 50 MCG: 50 INJECTION INTRAMUSCULAR; INTRAVENOUS at 17:40

## 2017-05-12 RX ADMIN — HYDROMORPHONE HYDROCHLORIDE 0.5 MG: 1 INJECTION, SOLUTION INTRAMUSCULAR; INTRAVENOUS; SUBCUTANEOUS at 18:10

## 2017-05-12 RX ADMIN — SODIUM CHLORIDE, POTASSIUM CHLORIDE, SODIUM LACTATE AND CALCIUM CHLORIDE 9 ML/HR: 600; 310; 30; 20 INJECTION, SOLUTION INTRAVENOUS at 11:18

## 2017-05-12 RX ADMIN — LIDOCAINE HYDROCHLORIDE 1 EACH: 40 SPRAY LARYNGEAL; TRANSTRACHEAL at 13:56

## 2017-05-12 RX ADMIN — EPHEDRINE SULFATE 5 MG: 50 INJECTION INTRAMUSCULAR; INTRAVENOUS; SUBCUTANEOUS at 15:13

## 2017-05-12 RX ADMIN — HYDRALAZINE HYDROCHLORIDE 5 MG: 20 INJECTION INTRAMUSCULAR; INTRAVENOUS at 17:07

## 2017-05-12 RX ADMIN — SODIUM CHLORIDE, POTASSIUM CHLORIDE, SODIUM LACTATE AND CALCIUM CHLORIDE: 600; 310; 30; 20 INJECTION, SOLUTION INTRAVENOUS at 14:53

## 2017-05-12 RX ADMIN — MIDAZOLAM HYDROCHLORIDE 2 MG: 1 INJECTION, SOLUTION INTRAMUSCULAR; INTRAVENOUS at 13:49

## 2017-05-12 RX ADMIN — DEXAMETHASONE SODIUM PHOSPHATE 8 MG: 10 INJECTION INTRAMUSCULAR; INTRAVENOUS at 16:46

## 2017-05-12 RX ADMIN — HYDROMORPHONE HYDROCHLORIDE 0.5 MG: 1 INJECTION, SOLUTION INTRAMUSCULAR; INTRAVENOUS; SUBCUTANEOUS at 18:20

## 2017-05-12 RX ADMIN — LIDOCAINE HYDROCHLORIDE 60 MG: 20 INJECTION, SOLUTION INFILTRATION; PERINEURAL at 13:49

## 2017-05-12 RX ADMIN — HYDRALAZINE HYDROCHLORIDE 25 MG: 25 TABLET, FILM COATED ORAL at 08:56

## 2017-05-12 RX ADMIN — DEXTROSE AND SODIUM CHLORIDE 125 ML/HR: 5; 450 INJECTION, SOLUTION INTRAVENOUS at 08:58

## 2017-05-12 RX ADMIN — FENTANYL CITRATE 50 MCG: 50 INJECTION, SOLUTION INTRAMUSCULAR; INTRAVENOUS at 14:28

## 2017-05-12 RX ADMIN — NEOSTIGMINE METHYLSULFATE 1 MG: 1 INJECTION INTRAVENOUS at 15:12

## 2017-05-12 RX ADMIN — VANCOMYCIN HYDROCHLORIDE 1500 MG: 1 INJECTION, POWDER, LYOPHILIZED, FOR SOLUTION INTRAVENOUS at 11:38

## 2017-05-12 RX ADMIN — FENTANYL CITRATE 50 MCG: 50 INJECTION, SOLUTION INTRAMUSCULAR; INTRAVENOUS at 14:25

## 2017-05-12 RX ADMIN — FENTANYL CITRATE 50 MCG: 50 INJECTION INTRAMUSCULAR; INTRAVENOUS at 17:54

## 2017-05-12 RX ADMIN — EPHEDRINE SULFATE 10 MG: 50 INJECTION INTRAMUSCULAR; INTRAVENOUS; SUBCUTANEOUS at 14:21

## 2017-05-13 LAB
ALBUMIN SERPL-MCNC: 3.3 G/DL (ref 3.5–5.2)
ALBUMIN/GLOB SERPL: 1.3 G/DL
ALP SERPL-CCNC: 50 U/L (ref 39–117)
ALT SERPL W P-5'-P-CCNC: 25 U/L (ref 1–41)
ANION GAP SERPL CALCULATED.3IONS-SCNC: 12.7 MMOL/L
AST SERPL-CCNC: 65 U/L (ref 1–40)
BILIRUB SERPL-MCNC: 0.2 MG/DL (ref 0.1–1.2)
BUN BLD-MCNC: 11 MG/DL (ref 8–23)
BUN/CREAT SERPL: 11.1 (ref 7–25)
CALCIUM SPEC-SCNC: 8.5 MG/DL (ref 8.6–10.5)
CHLORIDE SERPL-SCNC: 100 MMOL/L (ref 98–107)
CO2 SERPL-SCNC: 20.3 MMOL/L (ref 22–29)
CREAT BLD-MCNC: 0.99 MG/DL (ref 0.76–1.27)
DEPRECATED RDW RBC AUTO: 44.1 FL (ref 37–54)
ERYTHROCYTE [DISTWIDTH] IN BLOOD BY AUTOMATED COUNT: 13.1 % (ref 11.5–14.5)
GFR SERPL CREATININE-BSD FRML MDRD: 77 ML/MIN/1.73
GLOBULIN UR ELPH-MCNC: 2.6 GM/DL
GLUCOSE BLD-MCNC: 212 MG/DL (ref 65–99)
GLUCOSE BLDC GLUCOMTR-MCNC: 131 MG/DL (ref 70–130)
GLUCOSE BLDC GLUCOMTR-MCNC: 161 MG/DL (ref 70–130)
GLUCOSE BLDC GLUCOMTR-MCNC: 171 MG/DL (ref 70–130)
HCT VFR BLD AUTO: 32.3 % (ref 40.4–52.2)
HGB BLD-MCNC: 10.4 G/DL (ref 13.7–17.6)
MCH RBC QN AUTO: 29.4 PG (ref 27–32.7)
MCHC RBC AUTO-ENTMCNC: 32.2 G/DL (ref 32.6–36.4)
MCV RBC AUTO: 91.2 FL (ref 79.8–96.2)
PLATELET # BLD AUTO: 235 10*3/MM3 (ref 140–500)
PMV BLD AUTO: 9.7 FL (ref 6–12)
POTASSIUM BLD-SCNC: 5 MMOL/L (ref 3.5–5.2)
PROT SERPL-MCNC: 5.9 G/DL (ref 6–8.5)
RBC # BLD AUTO: 3.54 10*6/MM3 (ref 4.6–6)
SODIUM BLD-SCNC: 133 MMOL/L (ref 136–145)
WBC NRBC COR # BLD: 8.31 10*3/MM3 (ref 4.5–10.7)

## 2017-05-13 PROCEDURE — 85027 COMPLETE CBC AUTOMATED: CPT | Performed by: INTERNAL MEDICINE

## 2017-05-13 PROCEDURE — 80053 COMPREHEN METABOLIC PANEL: CPT | Performed by: INTERNAL MEDICINE

## 2017-05-13 PROCEDURE — 63710000001 SIROLIMUS 0.5 MG TABLET: Performed by: INTERNAL MEDICINE

## 2017-05-13 PROCEDURE — 97110 THERAPEUTIC EXERCISES: CPT

## 2017-05-13 PROCEDURE — 82962 GLUCOSE BLOOD TEST: CPT

## 2017-05-13 PROCEDURE — 63710000001 INSULIN ASPART PER 5 UNITS: Performed by: INTERNAL MEDICINE

## 2017-05-13 PROCEDURE — G8979 MOBILITY GOAL STATUS: HCPCS

## 2017-05-13 PROCEDURE — G0378 HOSPITAL OBSERVATION PER HR: HCPCS

## 2017-05-13 PROCEDURE — 97161 PT EVAL LOW COMPLEX 20 MIN: CPT

## 2017-05-13 PROCEDURE — 25010000002 VANCOMYCIN: Performed by: ORTHOPAEDIC SURGERY

## 2017-05-13 PROCEDURE — G8978 MOBILITY CURRENT STATUS: HCPCS

## 2017-05-13 PROCEDURE — 63710000001 PREDNISONE PER 5 MG: Performed by: ORTHOPAEDIC SURGERY

## 2017-05-13 RX ORDER — SPIRONOLACTONE 50 MG/1
50 TABLET, FILM COATED ORAL EVERY MORNING
Status: DISCONTINUED | OUTPATIENT
Start: 2017-05-14 | End: 2017-05-14 | Stop reason: HOSPADM

## 2017-05-13 RX ORDER — NICOTINE POLACRILEX 4 MG
15 LOZENGE BUCCAL
Status: DISCONTINUED | OUTPATIENT
Start: 2017-05-13 | End: 2017-05-14 | Stop reason: HOSPADM

## 2017-05-13 RX ORDER — CYCLOBENZAPRINE HCL 10 MG
10 TABLET ORAL EVERY 8 HOURS SCHEDULED
Qty: 50 TABLET | Refills: 0 | Status: SHIPPED | OUTPATIENT
Start: 2017-05-13 | End: 2017-05-27

## 2017-05-13 RX ORDER — DEXTROSE MONOHYDRATE 25 G/50ML
25 INJECTION, SOLUTION INTRAVENOUS
Status: DISCONTINUED | OUTPATIENT
Start: 2017-05-13 | End: 2017-05-14 | Stop reason: HOSPADM

## 2017-05-13 RX ORDER — OXYCODONE AND ACETAMINOPHEN 7.5; 325 MG/1; MG/1
1 TABLET ORAL EVERY 4 HOURS PRN
Qty: 90 TABLET | Refills: 0 | Status: SHIPPED | OUTPATIENT
Start: 2017-05-13 | End: 2017-05-28

## 2017-05-13 RX ADMIN — GABAPENTIN 300 MG: 300 CAPSULE ORAL at 20:47

## 2017-05-13 RX ADMIN — CYCLOBENZAPRINE HYDROCHLORIDE 10 MG: 10 TABLET, FILM COATED ORAL at 22:00

## 2017-05-13 RX ADMIN — PREDNISONE 50 MG: 50 TABLET ORAL at 08:16

## 2017-05-13 RX ADMIN — VANCOMYCIN HYDROCHLORIDE 1500 MG: 1 INJECTION, POWDER, LYOPHILIZED, FOR SOLUTION INTRAVENOUS at 20:48

## 2017-05-13 RX ADMIN — OXYCODONE HYDROCHLORIDE AND ACETAMINOPHEN 1 TABLET: 7.5; 325 TABLET ORAL at 00:11

## 2017-05-13 RX ADMIN — FLECAINIDE ACETATE 50 MG: 50 TABLET ORAL at 08:16

## 2017-05-13 RX ADMIN — OXYCODONE HYDROCHLORIDE AND ACETAMINOPHEN 2 TABLET: 7.5; 325 TABLET ORAL at 22:41

## 2017-05-13 RX ADMIN — OXYCODONE HYDROCHLORIDE AND ACETAMINOPHEN 1 TABLET: 7.5; 325 TABLET ORAL at 07:27

## 2017-05-13 RX ADMIN — INSULIN ASPART 2 UNITS: 100 INJECTION, SOLUTION INTRAVENOUS; SUBCUTANEOUS at 18:21

## 2017-05-13 RX ADMIN — OXYCODONE HYDROCHLORIDE AND ACETAMINOPHEN 1 TABLET: 7.5; 325 TABLET ORAL at 14:26

## 2017-05-13 RX ADMIN — LEVOTHYROXINE SODIUM 25 MCG: 25 TABLET ORAL at 07:27

## 2017-05-13 RX ADMIN — HYDRALAZINE HYDROCHLORIDE 25 MG: 25 TABLET, FILM COATED ORAL at 08:15

## 2017-05-13 RX ADMIN — FLECAINIDE ACETATE 50 MG: 50 TABLET ORAL at 00:06

## 2017-05-13 RX ADMIN — CYCLOBENZAPRINE HYDROCHLORIDE 10 MG: 10 TABLET, FILM COATED ORAL at 07:26

## 2017-05-13 RX ADMIN — SPIRONOLACTONE 100 MG: 100 TABLET, FILM COATED ORAL at 07:26

## 2017-05-13 RX ADMIN — CYCLOBENZAPRINE HYDROCHLORIDE 10 MG: 10 TABLET, FILM COATED ORAL at 14:28

## 2017-05-13 RX ADMIN — VANCOMYCIN HYDROCHLORIDE 1500 MG: 1 INJECTION, POWDER, LYOPHILIZED, FOR SOLUTION INTRAVENOUS at 08:17

## 2017-05-13 RX ADMIN — GABAPENTIN 300 MG: 300 CAPSULE ORAL at 08:16

## 2017-05-13 RX ADMIN — FLECAINIDE ACETATE 50 MG: 50 TABLET ORAL at 20:47

## 2017-05-13 RX ADMIN — OXYCODONE HYDROCHLORIDE AND ACETAMINOPHEN 2 TABLET: 7.5; 325 TABLET ORAL at 18:26

## 2017-05-13 RX ADMIN — SIROLIMUS 2 MG: 0.5 TABLET, SUGAR COATED ORAL at 07:27

## 2017-05-13 RX ADMIN — INSULIN ASPART 2 UNITS: 100 INJECTION, SOLUTION INTRAVENOUS; SUBCUTANEOUS at 11:54

## 2017-05-13 RX ADMIN — HYDRALAZINE HYDROCHLORIDE 25 MG: 25 TABLET, FILM COATED ORAL at 18:21

## 2017-05-13 RX ADMIN — GABAPENTIN 300 MG: 300 CAPSULE ORAL at 16:44

## 2017-05-13 RX ADMIN — PREDNISONE 50 MG: 50 TABLET ORAL at 00:07

## 2017-05-13 RX ADMIN — PANTOPRAZOLE SODIUM 40 MG: 40 TABLET, DELAYED RELEASE ORAL at 07:27

## 2017-05-13 RX ADMIN — SODIUM CHLORIDE 100 ML/HR: 4.5 INJECTION, SOLUTION INTRAVENOUS at 08:17

## 2017-05-14 VITALS
DIASTOLIC BLOOD PRESSURE: 95 MMHG | BODY MASS INDEX: 33.34 KG/M2 | WEIGHT: 220 LBS | HEIGHT: 68 IN | RESPIRATION RATE: 16 BRPM | OXYGEN SATURATION: 95 % | HEART RATE: 94 BPM | TEMPERATURE: 97.8 F | SYSTOLIC BLOOD PRESSURE: 185 MMHG

## 2017-05-14 LAB
ANION GAP SERPL CALCULATED.3IONS-SCNC: 14.5 MMOL/L
BASOPHILS # BLD AUTO: 0 10*3/MM3 (ref 0–0.2)
BASOPHILS NFR BLD AUTO: 0 % (ref 0–1.5)
BUN BLD-MCNC: 21 MG/DL (ref 8–23)
BUN/CREAT SERPL: 16.5 (ref 7–25)
CALCIUM SPEC-SCNC: 8.2 MG/DL (ref 8.6–10.5)
CHLORIDE SERPL-SCNC: 97 MMOL/L (ref 98–107)
CO2 SERPL-SCNC: 19.5 MMOL/L (ref 22–29)
CREAT BLD-MCNC: 1.27 MG/DL (ref 0.76–1.27)
DEPRECATED RDW RBC AUTO: 43.9 FL (ref 37–54)
EOSINOPHIL # BLD AUTO: 0 10*3/MM3 (ref 0–0.7)
EOSINOPHIL NFR BLD AUTO: 0 % (ref 0.3–6.2)
ERYTHROCYTE [DISTWIDTH] IN BLOOD BY AUTOMATED COUNT: 13.2 % (ref 11.5–14.5)
GFR SERPL CREATININE-BSD FRML MDRD: 58 ML/MIN/1.73
GLUCOSE BLD-MCNC: 148 MG/DL (ref 65–99)
GLUCOSE BLDC GLUCOMTR-MCNC: 128 MG/DL (ref 70–130)
GLUCOSE BLDC GLUCOMTR-MCNC: 149 MG/DL (ref 70–130)
HBA1C MFR BLD: 4.94 % (ref 4.8–5.6)
HCT VFR BLD AUTO: 30.7 % (ref 40.4–52.2)
HGB BLD-MCNC: 10.1 G/DL (ref 13.7–17.6)
IMM GRANULOCYTES # BLD: 0.02 10*3/MM3 (ref 0–0.03)
IMM GRANULOCYTES NFR BLD: 0.2 % (ref 0–0.5)
LYMPHOCYTES # BLD AUTO: 0.38 10*3/MM3 (ref 0.9–4.8)
LYMPHOCYTES NFR BLD AUTO: 3.4 % (ref 19.6–45.3)
MCH RBC QN AUTO: 30.1 PG (ref 27–32.7)
MCHC RBC AUTO-ENTMCNC: 32.9 G/DL (ref 32.6–36.4)
MCV RBC AUTO: 91.4 FL (ref 79.8–96.2)
MONOCYTES # BLD AUTO: 0.37 10*3/MM3 (ref 0.2–1.2)
MONOCYTES NFR BLD AUTO: 3.3 % (ref 5–12)
NEUTROPHILS # BLD AUTO: 10.44 10*3/MM3 (ref 1.9–8.1)
NEUTROPHILS NFR BLD AUTO: 93.1 % (ref 42.7–76)
PLATELET # BLD AUTO: 226 10*3/MM3 (ref 140–500)
PMV BLD AUTO: 9.8 FL (ref 6–12)
POTASSIUM BLD-SCNC: 4.4 MMOL/L (ref 3.5–5.2)
RBC # BLD AUTO: 3.36 10*6/MM3 (ref 4.6–6)
SODIUM BLD-SCNC: 131 MMOL/L (ref 136–145)
VANCOMYCIN TROUGH SERPL-MCNC: 24 MCG/ML (ref 5–20)
WBC NRBC COR # BLD: 11.21 10*3/MM3 (ref 4.5–10.7)

## 2017-05-14 PROCEDURE — 82962 GLUCOSE BLOOD TEST: CPT

## 2017-05-14 PROCEDURE — 83036 HEMOGLOBIN GLYCOSYLATED A1C: CPT | Performed by: INTERNAL MEDICINE

## 2017-05-14 PROCEDURE — 63710000001 PREDNISONE PER 5 MG: Performed by: ORTHOPAEDIC SURGERY

## 2017-05-14 PROCEDURE — 80202 ASSAY OF VANCOMYCIN: CPT | Performed by: ORTHOPAEDIC SURGERY

## 2017-05-14 PROCEDURE — 85025 COMPLETE CBC W/AUTO DIFF WBC: CPT | Performed by: INTERNAL MEDICINE

## 2017-05-14 PROCEDURE — G0378 HOSPITAL OBSERVATION PER HR: HCPCS

## 2017-05-14 PROCEDURE — 63710000001 SIROLIMUS 0.5 MG TABLET: Performed by: INTERNAL MEDICINE

## 2017-05-14 PROCEDURE — 80048 BASIC METABOLIC PNL TOTAL CA: CPT | Performed by: INTERNAL MEDICINE

## 2017-05-14 RX ORDER — VANCOMYCIN HYDROCHLORIDE 1 G/200ML
1000 INJECTION, SOLUTION INTRAVENOUS EVERY 12 HOURS
Status: DISCONTINUED | OUTPATIENT
Start: 2017-05-14 | End: 2017-05-14 | Stop reason: HOSPADM

## 2017-05-14 RX ADMIN — FLECAINIDE ACETATE 50 MG: 50 TABLET ORAL at 07:03

## 2017-05-14 RX ADMIN — PANTOPRAZOLE SODIUM 40 MG: 40 TABLET, DELAYED RELEASE ORAL at 07:02

## 2017-05-14 RX ADMIN — PREDNISONE 50 MG: 50 TABLET ORAL at 08:08

## 2017-05-14 RX ADMIN — SPIRONOLACTONE 50 MG: 50 TABLET, FILM COATED ORAL at 07:02

## 2017-05-14 RX ADMIN — GABAPENTIN 300 MG: 300 CAPSULE ORAL at 08:09

## 2017-05-14 RX ADMIN — SIROLIMUS 2 MG: 0.5 TABLET, SUGAR COATED ORAL at 07:02

## 2017-05-14 RX ADMIN — LEVOTHYROXINE SODIUM 25 MCG: 25 TABLET ORAL at 07:02

## 2017-05-14 RX ADMIN — HYDRALAZINE HYDROCHLORIDE 25 MG: 25 TABLET, FILM COATED ORAL at 08:08

## 2017-05-14 RX ADMIN — CYCLOBENZAPRINE HYDROCHLORIDE 10 MG: 10 TABLET, FILM COATED ORAL at 07:02

## 2017-05-26 ENCOUNTER — DOCUMENTATION (OUTPATIENT)
Dept: SOCIAL WORK | Facility: HOSPITAL | Age: 62
End: 2017-05-26

## 2019-05-08 ENCOUNTER — APPOINTMENT (OUTPATIENT)
Dept: GENERAL RADIOLOGY | Facility: HOSPITAL | Age: 64
End: 2019-05-08

## 2019-05-08 ENCOUNTER — HOSPITAL ENCOUNTER (INPATIENT)
Facility: HOSPITAL | Age: 64
LOS: 5 days | Discharge: HOME | End: 2019-05-13
Attending: EMERGENCY MEDICINE | Admitting: HOSPITALIST

## 2019-05-08 ENCOUNTER — APPOINTMENT (OUTPATIENT)
Dept: CT IMAGING | Facility: HOSPITAL | Age: 64
End: 2019-05-08

## 2019-05-08 DIAGNOSIS — I50.9 ACUTE CONGESTIVE HEART FAILURE, UNSPECIFIED HEART FAILURE TYPE (HCC): Primary | ICD-10-CM

## 2019-05-08 DIAGNOSIS — J01.40 ACUTE PANSINUSITIS, RECURRENCE NOT SPECIFIED: ICD-10-CM

## 2019-05-08 LAB
ALBUMIN SERPL-MCNC: 4 G/DL (ref 3.5–5.2)
ALBUMIN/GLOB SERPL: 1 G/DL
ALP SERPL-CCNC: 115 U/L (ref 39–117)
ALT SERPL W P-5'-P-CCNC: 19 U/L (ref 1–41)
ANION GAP SERPL CALCULATED.3IONS-SCNC: 14.4 MMOL/L
AST SERPL-CCNC: 29 U/L (ref 1–40)
BASOPHILS # BLD AUTO: 0.01 10*3/MM3 (ref 0–0.2)
BASOPHILS NFR BLD AUTO: 0.2 % (ref 0–1.5)
BILIRUB SERPL-MCNC: <0.2 MG/DL (ref 0.2–1.2)
BUN BLD-MCNC: 25 MG/DL (ref 8–23)
BUN/CREAT SERPL: 16.9 (ref 7–25)
CALCIUM SPEC-SCNC: 9.8 MG/DL (ref 8.6–10.5)
CHLORIDE SERPL-SCNC: 100 MMOL/L (ref 98–107)
CO2 SERPL-SCNC: 20.6 MMOL/L (ref 22–29)
CREAT BLD-MCNC: 1.48 MG/DL (ref 0.76–1.27)
D DIMER PPP FEU-MCNC: 1.91 MCGFEU/ML (ref 0–0.49)
DEPRECATED RDW RBC AUTO: 42.3 FL (ref 37–54)
EOSINOPHIL # BLD AUTO: 0.02 10*3/MM3 (ref 0–0.4)
EOSINOPHIL NFR BLD AUTO: 0.3 % (ref 0.3–6.2)
ERYTHROCYTE [DISTWIDTH] IN BLOOD BY AUTOMATED COUNT: 13.2 % (ref 12.3–15.4)
GFR SERPL CREATININE-BSD FRML MDRD: 48 ML/MIN/1.73
GLOBULIN UR ELPH-MCNC: 4.1 GM/DL
GLUCOSE BLD-MCNC: 112 MG/DL (ref 65–99)
HCT VFR BLD AUTO: 31.5 % (ref 37.5–51)
HGB BLD-MCNC: 10.4 G/DL (ref 13–17.7)
IMM GRANULOCYTES # BLD AUTO: 0.03 10*3/MM3 (ref 0–0.05)
IMM GRANULOCYTES NFR BLD AUTO: 0.5 % (ref 0–0.5)
LYMPHOCYTES # BLD AUTO: 0.44 10*3/MM3 (ref 0.7–3.1)
LYMPHOCYTES NFR BLD AUTO: 7 % (ref 19.6–45.3)
MCH RBC QN AUTO: 28.9 PG (ref 26.6–33)
MCHC RBC AUTO-ENTMCNC: 33 G/DL (ref 31.5–35.7)
MCV RBC AUTO: 87.5 FL (ref 79–97)
MONOCYTES # BLD AUTO: 0.65 10*3/MM3 (ref 0.1–0.9)
MONOCYTES NFR BLD AUTO: 10.4 % (ref 5–12)
NEUTROPHILS # BLD AUTO: 5.1 10*3/MM3 (ref 1.7–7)
NEUTROPHILS NFR BLD AUTO: 81.6 % (ref 42.7–76)
NRBC BLD AUTO-RTO: 0 /100 WBC (ref 0–0.2)
NT-PROBNP SERPL-MCNC: 2137 PG/ML (ref 5–900)
PLATELET # BLD AUTO: 430 10*3/MM3 (ref 140–450)
PMV BLD AUTO: 9.1 FL (ref 6–12)
POTASSIUM BLD-SCNC: 5.2 MMOL/L (ref 3.5–5.2)
PROT SERPL-MCNC: 8.1 G/DL (ref 6–8.5)
RBC # BLD AUTO: 3.6 10*6/MM3 (ref 4.14–5.8)
SODIUM BLD-SCNC: 135 MMOL/L (ref 136–145)
TROPONIN T SERPL-MCNC: <0.01 NG/ML (ref 0–0.03)
WBC NRBC COR # BLD: 6.25 10*3/MM3 (ref 3.4–10.8)

## 2019-05-08 PROCEDURE — 25010000002 FUROSEMIDE PER 20 MG: Performed by: EMERGENCY MEDICINE

## 2019-05-08 PROCEDURE — 25010000002 ONDANSETRON PER 1 MG: Performed by: EMERGENCY MEDICINE

## 2019-05-08 PROCEDURE — 94640 AIRWAY INHALATION TREATMENT: CPT

## 2019-05-08 PROCEDURE — 25010000002 MORPHINE PER 10 MG: Performed by: EMERGENCY MEDICINE

## 2019-05-08 PROCEDURE — 85025 COMPLETE CBC W/AUTO DIFF WBC: CPT | Performed by: EMERGENCY MEDICINE

## 2019-05-08 PROCEDURE — 84484 ASSAY OF TROPONIN QUANT: CPT | Performed by: EMERGENCY MEDICINE

## 2019-05-08 PROCEDURE — 99285 EMERGENCY DEPT VISIT HI MDM: CPT

## 2019-05-08 PROCEDURE — 85379 FIBRIN DEGRADATION QUANT: CPT | Performed by: EMERGENCY MEDICINE

## 2019-05-08 PROCEDURE — 71275 CT ANGIOGRAPHY CHEST: CPT

## 2019-05-08 PROCEDURE — 94799 UNLISTED PULMONARY SVC/PX: CPT

## 2019-05-08 PROCEDURE — 80053 COMPREHEN METABOLIC PANEL: CPT | Performed by: EMERGENCY MEDICINE

## 2019-05-08 PROCEDURE — 71046 X-RAY EXAM CHEST 2 VIEWS: CPT

## 2019-05-08 PROCEDURE — 93005 ELECTROCARDIOGRAM TRACING: CPT | Performed by: EMERGENCY MEDICINE

## 2019-05-08 PROCEDURE — 25010000002 HYDROMORPHONE PER 4 MG: Performed by: HOSPITALIST

## 2019-05-08 PROCEDURE — 0 IOPAMIDOL PER 1 ML: Performed by: EMERGENCY MEDICINE

## 2019-05-08 PROCEDURE — 70491 CT SOFT TISSUE NECK W/DYE: CPT

## 2019-05-08 PROCEDURE — 93010 ELECTROCARDIOGRAM REPORT: CPT | Performed by: INTERNAL MEDICINE

## 2019-05-08 PROCEDURE — 83880 ASSAY OF NATRIURETIC PEPTIDE: CPT | Performed by: EMERGENCY MEDICINE

## 2019-05-08 RX ORDER — ATORVASTATIN CALCIUM 20 MG/1
20 TABLET, FILM COATED ORAL NIGHTLY
Status: DISCONTINUED | OUTPATIENT
Start: 2019-05-08 | End: 2019-05-09

## 2019-05-08 RX ORDER — PANTOPRAZOLE SODIUM 40 MG/1
40 TABLET, DELAYED RELEASE ORAL
Status: DISCONTINUED | OUTPATIENT
Start: 2019-05-09 | End: 2019-05-09

## 2019-05-08 RX ORDER — ONDANSETRON 2 MG/ML
4 INJECTION INTRAMUSCULAR; INTRAVENOUS EVERY 4 HOURS PRN
Status: DISCONTINUED | OUTPATIENT
Start: 2019-05-08 | End: 2019-05-09

## 2019-05-08 RX ORDER — HYDROMORPHONE HYDROCHLORIDE 1 MG/ML
0.5 INJECTION, SOLUTION INTRAMUSCULAR; INTRAVENOUS; SUBCUTANEOUS EVERY 4 HOURS PRN
Status: DISCONTINUED | OUTPATIENT
Start: 2019-05-08 | End: 2019-05-13

## 2019-05-08 RX ORDER — SIROLIMUS 1 MG/1
1 TABLET, FILM COATED ORAL DAILY
Status: DISCONTINUED | OUTPATIENT
Start: 2019-05-09 | End: 2019-05-08

## 2019-05-08 RX ORDER — MONTELUKAST SODIUM 10 MG/1
10 TABLET ORAL NIGHTLY
Status: DISCONTINUED | OUTPATIENT
Start: 2019-05-08 | End: 2019-05-13 | Stop reason: HOSPADM

## 2019-05-08 RX ORDER — SPIRONOLACTONE 100 MG/1
100 TABLET, FILM COATED ORAL DAILY
Status: DISCONTINUED | OUTPATIENT
Start: 2019-05-09 | End: 2019-05-13 | Stop reason: HOSPADM

## 2019-05-08 RX ORDER — FUROSEMIDE 10 MG/ML
40 INJECTION INTRAMUSCULAR; INTRAVENOUS ONCE
Status: COMPLETED | OUTPATIENT
Start: 2019-05-08 | End: 2019-05-08

## 2019-05-08 RX ORDER — IPRATROPIUM BROMIDE AND ALBUTEROL SULFATE 2.5; .5 MG/3ML; MG/3ML
3 SOLUTION RESPIRATORY (INHALATION) EVERY 4 HOURS PRN
Status: DISCONTINUED | OUTPATIENT
Start: 2019-05-08 | End: 2019-05-13

## 2019-05-08 RX ORDER — FUROSEMIDE 10 MG/ML
40 INJECTION INTRAMUSCULAR; INTRAVENOUS EVERY 12 HOURS
Status: DISCONTINUED | OUTPATIENT
Start: 2019-05-09 | End: 2019-05-10

## 2019-05-08 RX ORDER — SODIUM CHLORIDE 0.9 % (FLUSH) 0.9 %
10 SYRINGE (ML) INJECTION AS NEEDED
Status: DISCONTINUED | OUTPATIENT
Start: 2019-05-08 | End: 2019-05-13 | Stop reason: HOSPADM

## 2019-05-08 RX ORDER — MORPHINE SULFATE 2 MG/ML
4 INJECTION, SOLUTION INTRAMUSCULAR; INTRAVENOUS ONCE
Status: COMPLETED | OUTPATIENT
Start: 2019-05-08 | End: 2019-05-08

## 2019-05-08 RX ORDER — CHLORPHENIRAMINE MALEATE 4 MG/1
2 TABLET ORAL EVERY 6 HOURS PRN
COMMUNITY
End: 2019-05-13 | Stop reason: HOSPADM

## 2019-05-08 RX ORDER — MONTELUKAST SODIUM 10 MG/1
10 TABLET ORAL NIGHTLY
COMMUNITY

## 2019-05-08 RX ORDER — SIROLIMUS 1 MG/1
2 TABLET, FILM COATED ORAL DAILY
Status: DISCONTINUED | OUTPATIENT
Start: 2019-05-09 | End: 2019-05-08

## 2019-05-08 RX ORDER — ONDANSETRON 2 MG/ML
4 INJECTION INTRAMUSCULAR; INTRAVENOUS ONCE
Status: COMPLETED | OUTPATIENT
Start: 2019-05-08 | End: 2019-05-08

## 2019-05-08 RX ORDER — IPRATROPIUM BROMIDE AND ALBUTEROL SULFATE 2.5; .5 MG/3ML; MG/3ML
3 SOLUTION RESPIRATORY (INHALATION) ONCE
Status: COMPLETED | OUTPATIENT
Start: 2019-05-08 | End: 2019-05-08

## 2019-05-08 RX ORDER — DEXLANSOPRAZOLE 60 MG/1
60 CAPSULE, DELAYED RELEASE ORAL 2 TIMES DAILY
COMMUNITY
End: 2019-05-13 | Stop reason: HOSPADM

## 2019-05-08 RX ORDER — SIROLIMUS 0.5 MG/1
3 TABLET, FILM COATED ORAL DAILY
Status: DISCONTINUED | OUTPATIENT
Start: 2019-05-09 | End: 2019-05-13 | Stop reason: HOSPADM

## 2019-05-08 RX ORDER — HYDRALAZINE HYDROCHLORIDE 50 MG/1
50 TABLET, FILM COATED ORAL 3 TIMES DAILY
Status: DISCONTINUED | OUTPATIENT
Start: 2019-05-08 | End: 2019-05-09

## 2019-05-08 RX ORDER — ATORVASTATIN CALCIUM 20 MG/1
20 TABLET, FILM COATED ORAL NIGHTLY
COMMUNITY
End: 2019-05-13 | Stop reason: HOSPADM

## 2019-05-08 RX ORDER — LEVOTHYROXINE SODIUM 0.03 MG/1
25 TABLET ORAL
Status: DISCONTINUED | OUTPATIENT
Start: 2019-05-09 | End: 2019-05-13 | Stop reason: HOSPADM

## 2019-05-08 RX ADMIN — MORPHINE SULFATE 4 MG: 2 INJECTION, SOLUTION INTRAMUSCULAR; INTRAVENOUS at 17:50

## 2019-05-08 RX ADMIN — HYDROMORPHONE HYDROCHLORIDE 0.5 MG: 1 INJECTION, SOLUTION INTRAMUSCULAR; INTRAVENOUS; SUBCUTANEOUS at 22:08

## 2019-05-08 RX ADMIN — IOPAMIDOL 95 ML: 755 INJECTION, SOLUTION INTRAVENOUS at 17:00

## 2019-05-08 RX ADMIN — MONTELUKAST SODIUM 10 MG: 10 TABLET, FILM COATED ORAL at 23:09

## 2019-05-08 RX ADMIN — ATORVASTATIN CALCIUM 20 MG: 20 TABLET, FILM COATED ORAL at 23:09

## 2019-05-08 RX ADMIN — HYDRALAZINE HYDROCHLORIDE 50 MG: 50 TABLET, FILM COATED ORAL at 23:09

## 2019-05-08 RX ADMIN — ONDANSETRON 4 MG: 2 INJECTION INTRAMUSCULAR; INTRAVENOUS at 17:49

## 2019-05-08 RX ADMIN — IPRATROPIUM BROMIDE AND ALBUTEROL SULFATE 3 ML: 2.5; .5 SOLUTION RESPIRATORY (INHALATION) at 16:11

## 2019-05-08 RX ADMIN — FUROSEMIDE 40 MG: 10 INJECTION, SOLUTION INTRAMUSCULAR; INTRAVENOUS at 17:52

## 2019-05-08 NOTE — ED TRIAGE NOTES
Patient to er from PCP with c/o shortness of breath and coughing for the last few months. Patient reported swelling in jaw and throat over the last three days.

## 2019-05-08 NOTE — ED NOTES
Pt c/o chest tightness when coughing. He denies productive cough, fever or chills. Reassurance given; call light in reach. Pts breathing even and unlabored. Pt appears in NAD at this time. Family at bedside.        Mary Alice Vallejo RN  05/08/19 [id number]

## 2019-05-08 NOTE — ED PROVIDER NOTES
EMERGENCY DEPARTMENT ENCOUNTER    Room Number:  37/37  PCP: Ankit Mcmahon MD  Historian: patient  History Limited By: nothing      HPI  Chief Complaint: Cough and SOA  Context: Milton Figueroa is a 63 y.o. male who presents to the ED c/o cough and SOA that started in January. Pt admits to sore throat, pain with swallowing, and nasal congestion. Pt notes that lying down worsens the sx. Pt reports that he went to see his PCP and they referred him to the ED. Pt notes that he has had a CXR and EKG. Pt admits to hx of hypertension and asthma      Location: N/A  Radiation: N/A  Character: cough and SOA  Duration: 5 months  Severity: mild  Progression: unchanged  Aggravating Factors: Lying down   Alleviating Factors: none          PAST MEDICAL HISTORY  Active Ambulatory Problems     Diagnosis Date Noted   • Back pain 05/12/2017   • HTN (hypertension) 05/12/2017   • Asthma 05/12/2017   • Hepatitis C 05/12/2017     Resolved Ambulatory Problems     Diagnosis Date Noted   • No Resolved Ambulatory Problems     Past Medical History:   Diagnosis Date   • Acid reflux    • Aortic stenosis    • Arthritis    • Asthma    • Disease of thyroid gland    • Hepatitis C    • History of atrial fibrillation    • Hypertension    • Low back pain    • Murmur          PAST SURGICAL HISTORY  Past Surgical History:   Procedure Laterality Date   • APPENDECTOMY     • EPIDURAL BLOCK     • INTERVENTIONAL RADIOLOGY PROCEDURE Right 3/22/2017    Procedure: ARTHROCENTESIS ASPIRATION RIGHT KNEE ;  Surgeon: Bennett Resendiz DO;  Location: [facility];  Service:    • KNEE SURGERY Bilateral    • LIVER TRANSPLANTATION      2010 DEC   • LUMBAR DISCECTOMY FUSION INSTRUMENTATION Bilateral 3/22/2017    Procedure: BILATERAL L 4-5 MICROLAMINECTOMY WITH METRIX ;  Surgeon: Bennett Resendiz DO;  Location: [facility];  Service:    • LUMBAR DISCECTOMY FUSION INSTRUMENTATION Bilateral 5/12/2017    Procedure: Minimally invasive transforaminal lumbar interbody fusion lumbar  four to lumbar five;  Surgeon: Bennett Resendiz DO;  Location: [facility] OR;  Service:    • SHOULDER SURGERY Right          FAMILY HISTORY  History reviewed. No pertinent family history.      SOCIAL HISTORY  Social History     Socioeconomic History   • Marital status:      Spouse name: Not on file   • Number of children: Not on file   • Years of education: Not on file   • Highest education level: Not on file   Tobacco Use   • Smoking status: Former Smoker     Packs/day: 0.50     Years: 15.00     Pack years: 7.50     Types: Cigarettes     Last attempt to quit:      Years since quittin.3   • Smokeless tobacco: Never Used   Substance and Sexual Activity   • Alcohol use: No   • Drug use: No   • Sexual activity: Defer         ALLERGIES  Lisinopril; Ace inhibitors; Metoprolol; and Penicillins        REVIEW OF SYSTEMS  Review of Systems   Constitutional: Negative for activity change, appetite change and fever.   HENT: Positive for congestion, sore throat and trouble swallowing.    Eyes: Negative.    Respiratory: Positive for shortness of breath. Negative for cough.    Cardiovascular: Negative for chest pain and leg swelling.   Gastrointestinal: Negative for abdominal pain, diarrhea and vomiting.   Endocrine: Negative.    Genitourinary: Negative for decreased urine volume and dysuria.   Musculoskeletal: Negative for neck pain.   Skin: Negative for rash and wound.   Allergic/Immunologic: Negative.    Neurological: Negative for weakness, numbness and headaches.   Hematological: Negative.    Psychiatric/Behavioral: Negative.    All other systems reviewed and are negative.           PHYSICAL EXAM  ED Triage Vitals [19 1428]   Temp Heart Rate Resp BP SpO2   99.1 °F (37.3 °C) 119 -- -- 99 %      Temp src Heart Rate Source Patient Position BP Location FiO2 (%)   -- -- -- -- --       Physical Exam   Constitutional: He is oriented to person, place, and time. No distress.   HENT:   Head: Normocephalic and  atraumatic.   Eyes: EOM are normal. Pupils are equal, round, and reactive to light.   Neck: Normal range of motion. Neck supple.   Cardiovascular: Regular rhythm. Tachycardia present.   Murmur heard.  Pulmonary/Chest: Effort normal and breath sounds normal. No respiratory distress.   Abdominal: Soft. There is no tenderness. There is no rebound and no guarding.   Musculoskeletal: Normal range of motion. He exhibits edema (2+ BLE).   Neurological: He is alert and oriented to person, place, and time. He has normal sensation and normal strength.   Skin: Skin is warm and dry.   Psychiatric: Mood and affect normal.   Nursing note and vitals reviewed.          LAB RESULTS  Recent Results (from the past 24 hour(s))   Comprehensive Metabolic Panel    Collection Time: 05/08/19  3:49 PM   Result Value Ref Range    Glucose 112 (H) 65 - 99 mg/dL    BUN 25 (H) 8 - 23 mg/dL    Creatinine 1.48 (H) 0.76 - 1.27 mg/dL    Sodium 135 (L) 136 - 145 mmol/L    Potassium 5.2 3.5 - 5.2 mmol/L    Chloride 100 98 - 107 mmol/L    CO2 20.6 (L) 22.0 - 29.0 mmol/L    Calcium 9.8 8.6 - 10.5 mg/dL    Total Protein 8.1 6.0 - 8.5 g/dL    Albumin 4.00 3.50 - 5.20 g/dL    ALT (SGPT) 19 1 - 41 U/L    AST (SGOT) 29 1 - 40 U/L    Alkaline Phosphatase 115 39 - 117 U/L    Total Bilirubin <0.2 (L) 0.2 - 1.2 mg/dL    eGFR Non African Amer 48 (L) >60 mL/min/1.73    Globulin 4.1 gm/dL    A/G Ratio 1.0 g/dL    BUN/Creatinine Ratio 16.9 7.0 - 25.0    Anion Gap 14.4 mmol/L   Troponin    Collection Time: 05/08/19  3:49 PM   Result Value Ref Range    Troponin T <0.010 0.000 - 0.030 ng/mL   BNP    Collection Time: 05/08/19  3:49 PM   Result Value Ref Range    proBNP 2,137.0 (H) 5.0 - 900.0 pg/mL   CBC Auto Differential    Collection Time: 05/08/19  3:49 PM   Result Value Ref Range    WBC 6.25 3.40 - 10.80 10*3/mm3    RBC 3.60 (L) 4.14 - 5.80 10*6/mm3    Hemoglobin 10.4 (L) 13.0 - 17.7 g/dL    Hematocrit 31.5 (L) 37.5 - 51.0 %    MCV 87.5 79.0 - 97.0 fL    MCH 28.9  26.6 - 33.0 pg    MCHC 33.0 31.5 - 35.7 g/dL    RDW 13.2 12.3 - 15.4 %    RDW-SD 42.3 37.0 - 54.0 fl    MPV 9.1 6.0 - 12.0 fL    Platelets 430 140 - 450 10*3/mm3    Neutrophil % 81.6 (H) 42.7 - 76.0 %    Lymphocyte % 7.0 (L) 19.6 - 45.3 %    Monocyte % 10.4 5.0 - 12.0 %    Eosinophil % 0.3 0.3 - 6.2 %    Basophil % 0.2 0.0 - 1.5 %    Immature Grans % 0.5 0.0 - 0.5 %    Neutrophils, Absolute 5.10 1.70 - 7.00 10*3/mm3    Lymphocytes, Absolute 0.44 (L) 0.70 - 3.10 10*3/mm3    Monocytes, Absolute 0.65 0.10 - 0.90 10*3/mm3    Eosinophils, Absolute 0.02 0.00 - 0.40 10*3/mm3    Basophils, Absolute 0.01 0.00 - 0.20 10*3/mm3    Immature Grans, Absolute 0.03 0.00 - 0.05 10*3/mm3    nRBC 0.0 0.0 - 0.2 /100 WBC   D-dimer, Quantitative    Collection Time: 05/08/19  4:11 PM   Result Value Ref Range    D-Dimer, Quantitative 1.91 (H) 0.00 - 0.49 MCGFEU/mL       Ordered the above labs and reviewed the results.        RADIOLOGY  XR Chest 2 View   Final Result   No evidence for acute pulmonary process. Follow-up as   clinical indications persist.       This report was finalized on 5/8/2019 4:06 PM by Dr. Ernesto Carver M.D.          CT Angiogram Chest With Contrast    (Results Pending)   CT Soft Tissue Neck With Contrast    (Results Pending)        Ordered the above noted radiological studies. Reviewed by me in PACS.  Spoke with Dr. Brown (radiologist) regarding CT/MRI scan results.          PROCEDURES  Procedures      EKG:          EKG time: 1604  Rhythm/Rate:   P waves and WI: nml,nml  QRS, axis: nml,nml   ST and T waves: nml,nml     Interpreted Contemporaneously by me, independently viewed and the rate is faster and otherwise unchanged compared to prior 3/16/17        MEDICATIONS GIVEN IN ER  Medications   sodium chloride 0.9 % flush 10 mL (not administered)   ipratropium-albuterol (DUO-NEB) nebulizer solution 3 mL (3 mL Nebulization Given 5/8/19 1611)   iopamidol (ISOVUE-370) 76 % injection 100 mL (95 mL Intravenous  Given by Other 5/8/19 1700)   morphine injection 4 mg (4 mg Intravenous Given 5/8/19 1750)   ondansetron (ZOFRAN) injection 4 mg (4 mg Intravenous Given 5/8/19 1749)   furosemide (LASIX) injection 40 mg (40 mg Intravenous Given 5/8/19 1752)             PROGRESS AND CONSULTS  ED Course as of May 08 1806   Wed May 08, 2019   1746 5:46 PM  Patient here with shortness of breath and cough.  Persistent cough.  I think part of this is CHF.  PMD sent over for worsening murmur.  Has lower extremity swelling with BNP over 2000.  CT negative for central PMD.  Has dysphagia as well but CT shows significant sinusitis but nothing in this airway.  Given lasix.  Discussed with Dr. Arriaga who will admit.  [SL]      ED Course User Index  [SL] Mike Garnett MD     1631- Rechecked pt who is resting comfortably in NAD. Informed pt of the lab results. D/w pt the plan for a CT. Pt understands and agrees with plan. All questions answered.      1732- Rechecked pt who is resting comfortably in NAD. Informed pt of the imaging results. D/w pt the plan to admit for further care. Pt understands and agrees with plan. All questions answered.      1733- Call placed to [facility]    1740- Discussed pt's case with Dr. Arriaga ([facility]) who agrees with plan to admit for further care.        MEDICAL DECISION MAKING      MDM  Number of Diagnoses or Management Options     Amount and/or Complexity of Data Reviewed  Clinical lab tests: ordered and reviewed (See note)  Tests in the radiology section of CPT®: reviewed and ordered (CXR: unremarkable  CTA: No central pulmonary emboli's. Lung nodules that will need f/u. Fatty liver.  CT soft tissue neck: unremarkable aside from severe sinus disease)  Tests in the medicine section of CPT®: ordered and reviewed (See EKG note)  Discussion of test results with the performing providers: yes (Dr. Brown)  Decide to obtain previous medical records or to obtain history from someone other than the patient: yes  Review and  summarize past medical records: yes (Pt was seen by his PCP on 5/6/19 for similar sx. Pt has been seen multiple times over the past 5 months for the sx.)               DIAGNOSIS  Final diagnoses:   Acute congestive heart failure, unspecified heart failure type (CMS/HCC)   Acute pansinusitis, recurrence not specified           DISPOSITION  ADMISSION    Discussed treatment plan and reason for admission with pt/family and admitting physician.  Pt/family voiced understanding of the plan for admission for further testing/treatment as needed.             Latest Documented Vital Signs:  As of 6:06 PM  BP- 165/92 HR- 108 Temp- 99.1 °F (37.3 °C) O2 sat- 98%        --  Documentation assistance provided by anahi Medley for Dr. Mariola MD.  Information recorded by the scribe was done at my direction and has been verified and validated by me.         Leila Medley  05/08/19 [id number]       Mike Garnett MD  05/08/19 [id number]

## 2019-05-09 ENCOUNTER — APPOINTMENT (OUTPATIENT)
Dept: CARDIOLOGY | Facility: HOSPITAL | Age: 64
End: 2019-05-09

## 2019-05-09 PROBLEM — M54.9 BACK PAIN: Status: RESOLVED | Noted: 2017-05-12 | Resolved: 2019-05-09

## 2019-05-09 PROBLEM — J01.90 ACUTE SINUSITIS: Status: ACTIVE | Noted: 2019-05-09

## 2019-05-09 PROBLEM — N18.30 CKD (CHRONIC KIDNEY DISEASE) STAGE 3, GFR 30-59 ML/MIN (HCC): Status: ACTIVE | Noted: 2019-05-09

## 2019-05-09 LAB
ALBUMIN SERPL-MCNC: 4 G/DL (ref 3.5–5.2)
ALBUMIN/GLOB SERPL: 1.1 G/DL
ALP SERPL-CCNC: 106 U/L (ref 39–117)
ALT SERPL W P-5'-P-CCNC: 17 U/L (ref 1–41)
ANION GAP SERPL CALCULATED.3IONS-SCNC: 16.2 MMOL/L
AST SERPL-CCNC: 20 U/L (ref 1–40)
B PARAPERT DNA SPEC QL NAA+PROBE: NOT DETECTED
B PERT DNA SPEC QL NAA+PROBE: NOT DETECTED
BILIRUB SERPL-MCNC: <0.2 MG/DL (ref 0.2–1.2)
BUN BLD-MCNC: 27 MG/DL (ref 8–23)
BUN/CREAT SERPL: 18.5 (ref 7–25)
C PNEUM DNA NPH QL NAA+NON-PROBE: NOT DETECTED
CALCIUM SPEC-SCNC: 9.6 MG/DL (ref 8.6–10.5)
CHLORIDE SERPL-SCNC: 96 MMOL/L (ref 98–107)
CO2 SERPL-SCNC: 19.8 MMOL/L (ref 22–29)
CREAT BLD-MCNC: 1.46 MG/DL (ref 0.76–1.27)
DEPRECATED RDW RBC AUTO: 42.4 FL (ref 37–54)
ERYTHROCYTE [DISTWIDTH] IN BLOOD BY AUTOMATED COUNT: 13.2 % (ref 12.3–15.4)
FLUAV H1 2009 PAND RNA NPH QL NAA+PROBE: NOT DETECTED
FLUAV H1 HA GENE NPH QL NAA+PROBE: NOT DETECTED
FLUAV H3 RNA NPH QL NAA+PROBE: NOT DETECTED
FLUAV SUBTYP SPEC NAA+PROBE: NOT DETECTED
FLUBV RNA ISLT QL NAA+PROBE: NOT DETECTED
GFR SERPL CREATININE-BSD FRML MDRD: 49 ML/MIN/1.73
GLOBULIN UR ELPH-MCNC: 3.7 GM/DL
GLUCOSE BLD-MCNC: 115 MG/DL (ref 65–99)
HADV DNA SPEC NAA+PROBE: NOT DETECTED
HCOV 229E RNA SPEC QL NAA+PROBE: NOT DETECTED
HCOV HKU1 RNA SPEC QL NAA+PROBE: NOT DETECTED
HCOV NL63 RNA SPEC QL NAA+PROBE: NOT DETECTED
HCOV OC43 RNA SPEC QL NAA+PROBE: NOT DETECTED
HCT VFR BLD AUTO: 30.2 % (ref 37.5–51)
HGB BLD-MCNC: 9.8 G/DL (ref 13–17.7)
HMPV RNA NPH QL NAA+NON-PROBE: NOT DETECTED
HPIV1 RNA SPEC QL NAA+PROBE: NOT DETECTED
HPIV2 RNA SPEC QL NAA+PROBE: NOT DETECTED
HPIV3 RNA NPH QL NAA+PROBE: NOT DETECTED
HPIV4 P GENE NPH QL NAA+PROBE: NOT DETECTED
M PNEUMO IGG SER IA-ACNC: NOT DETECTED
MCH RBC QN AUTO: 28.7 PG (ref 26.6–33)
MCHC RBC AUTO-ENTMCNC: 32.5 G/DL (ref 31.5–35.7)
MCV RBC AUTO: 88.3 FL (ref 79–97)
PLATELET # BLD AUTO: 408 10*3/MM3 (ref 140–450)
PMV BLD AUTO: 8.9 FL (ref 6–12)
POTASSIUM BLD-SCNC: 4.6 MMOL/L (ref 3.5–5.2)
PROT SERPL-MCNC: 7.7 G/DL (ref 6–8.5)
RBC # BLD AUTO: 3.42 10*6/MM3 (ref 4.14–5.8)
RHINOVIRUS RNA SPEC NAA+PROBE: NOT DETECTED
RSV RNA NPH QL NAA+NON-PROBE: NOT DETECTED
SODIUM BLD-SCNC: 132 MMOL/L (ref 136–145)
WBC NRBC COR # BLD: 7.34 10*3/MM3 (ref 3.4–10.8)

## 2019-05-09 PROCEDURE — 87581 M.PNEUMON DNA AMP PROBE: CPT | Performed by: INTERNAL MEDICINE

## 2019-05-09 PROCEDURE — 25010000002 HYDROMORPHONE PER 4 MG: Performed by: HOSPITALIST

## 2019-05-09 PROCEDURE — 94799 UNLISTED PULMONARY SVC/PX: CPT

## 2019-05-09 PROCEDURE — 80053 COMPREHEN METABOLIC PANEL: CPT | Performed by: HOSPITALIST

## 2019-05-09 PROCEDURE — 92610 EVALUATE SWALLOWING FUNCTION: CPT

## 2019-05-09 PROCEDURE — 87486 CHLMYD PNEUM DNA AMP PROBE: CPT | Performed by: INTERNAL MEDICINE

## 2019-05-09 PROCEDURE — 87798 DETECT AGENT NOS DNA AMP: CPT | Performed by: INTERNAL MEDICINE

## 2019-05-09 PROCEDURE — 25010000002 ENOXAPARIN PER 10 MG: Performed by: HOSPITALIST

## 2019-05-09 PROCEDURE — 94640 AIRWAY INHALATION TREATMENT: CPT

## 2019-05-09 PROCEDURE — 87633 RESP VIRUS 12-25 TARGETS: CPT | Performed by: INTERNAL MEDICINE

## 2019-05-09 PROCEDURE — 25010000002 PERFLUTREN (DEFINITY) 8.476 MG IN SODIUM CHLORIDE 0.9 % 10 ML INJECTION: Performed by: HOSPITALIST

## 2019-05-09 PROCEDURE — 85027 COMPLETE CBC AUTOMATED: CPT | Performed by: HOSPITALIST

## 2019-05-09 PROCEDURE — 93306 TTE W/DOPPLER COMPLETE: CPT

## 2019-05-09 PROCEDURE — 63710000001 SIROLIMUS 0.5 MG TABLET: Performed by: HOSPITALIST

## 2019-05-09 PROCEDURE — 25010000002 FUROSEMIDE PER 20 MG: Performed by: HOSPITALIST

## 2019-05-09 RX ORDER — ASPIRIN 81 MG/1
81 TABLET, CHEWABLE ORAL DAILY
Status: DISCONTINUED | OUTPATIENT
Start: 2019-05-09 | End: 2019-05-13 | Stop reason: HOSPADM

## 2019-05-09 RX ORDER — BUDESONIDE AND FORMOTEROL FUMARATE DIHYDRATE 160; 4.5 UG/1; UG/1
2 AEROSOL RESPIRATORY (INHALATION)
Status: DISCONTINUED | OUTPATIENT
Start: 2019-05-09 | End: 2019-05-13 | Stop reason: HOSPADM

## 2019-05-09 RX ORDER — GUAIFENESIN 600 MG/1
600 TABLET, EXTENDED RELEASE ORAL EVERY 12 HOURS SCHEDULED
Status: DISCONTINUED | OUTPATIENT
Start: 2019-05-09 | End: 2019-05-13 | Stop reason: HOSPADM

## 2019-05-09 RX ORDER — PANTOPRAZOLE SODIUM 40 MG/1
40 TABLET, DELAYED RELEASE ORAL
Status: DISCONTINUED | OUTPATIENT
Start: 2019-05-09 | End: 2019-05-13 | Stop reason: HOSPADM

## 2019-05-09 RX ORDER — LEVOFLOXACIN 750 MG/1
750 TABLET ORAL EVERY 24 HOURS
Status: DISCONTINUED | OUTPATIENT
Start: 2019-05-09 | End: 2019-05-11

## 2019-05-09 RX ORDER — MORPHINE SULFATE 2 MG/ML
4 INJECTION, SOLUTION INTRAMUSCULAR; INTRAVENOUS ONCE
Status: DISCONTINUED | OUTPATIENT
Start: 2019-05-09 | End: 2019-05-09

## 2019-05-09 RX ORDER — DILTIAZEM HYDROCHLORIDE 120 MG/1
120 CAPSULE, COATED, EXTENDED RELEASE ORAL
Status: DISCONTINUED | OUTPATIENT
Start: 2019-05-09 | End: 2019-05-13 | Stop reason: HOSPADM

## 2019-05-09 RX ORDER — ATORVASTATIN CALCIUM 10 MG/1
10 TABLET, FILM COATED ORAL NIGHTLY
Status: DISCONTINUED | OUTPATIENT
Start: 2019-05-09 | End: 2019-05-13 | Stop reason: HOSPADM

## 2019-05-09 RX ORDER — FUROSEMIDE 10 MG/ML
40 INJECTION INTRAMUSCULAR; INTRAVENOUS ONCE
Status: DISCONTINUED | OUTPATIENT
Start: 2019-05-09 | End: 2019-05-09

## 2019-05-09 RX ORDER — ALBUTEROL SULFATE 90 UG/1
2 AEROSOL, METERED RESPIRATORY (INHALATION) EVERY 4 HOURS PRN
Status: DISCONTINUED | OUTPATIENT
Start: 2019-05-09 | End: 2019-05-13

## 2019-05-09 RX ORDER — ONDANSETRON 2 MG/ML
4 INJECTION INTRAMUSCULAR; INTRAVENOUS EVERY 6 HOURS PRN
Status: DISCONTINUED | OUTPATIENT
Start: 2019-05-09 | End: 2019-05-13

## 2019-05-09 RX ORDER — IPRATROPIUM BROMIDE AND ALBUTEROL SULFATE 2.5; .5 MG/3ML; MG/3ML
3 SOLUTION RESPIRATORY (INHALATION) EVERY 4 HOURS PRN
Status: DISCONTINUED | OUTPATIENT
Start: 2019-05-09 | End: 2019-05-09

## 2019-05-09 RX ADMIN — HYDROMORPHONE HYDROCHLORIDE 0.5 MG: 1 INJECTION, SOLUTION INTRAMUSCULAR; INTRAVENOUS; SUBCUTANEOUS at 12:11

## 2019-05-09 RX ADMIN — MONTELUKAST SODIUM 10 MG: 10 TABLET, FILM COATED ORAL at 19:38

## 2019-05-09 RX ADMIN — GUAIFENESIN 600 MG: 600 TABLET, EXTENDED RELEASE ORAL at 14:45

## 2019-05-09 RX ADMIN — PANTOPRAZOLE SODIUM 40 MG: 40 TABLET, DELAYED RELEASE ORAL at 05:50

## 2019-05-09 RX ADMIN — PERFLUTREN 2 ML: 6.52 INJECTION, SUSPENSION INTRAVENOUS at 16:20

## 2019-05-09 RX ADMIN — ASPIRIN 81 MG: 81 TABLET, CHEWABLE ORAL at 14:44

## 2019-05-09 RX ADMIN — ATORVASTATIN CALCIUM 10 MG: 10 TABLET, FILM COATED ORAL at 19:38

## 2019-05-09 RX ADMIN — ENOXAPARIN SODIUM 40 MG: 40 INJECTION SUBCUTANEOUS at 14:44

## 2019-05-09 RX ADMIN — FUROSEMIDE 40 MG: 10 INJECTION, SOLUTION INTRAMUSCULAR; INTRAVENOUS at 05:50

## 2019-05-09 RX ADMIN — LEVOTHYROXINE SODIUM 25 MCG: 25 TABLET ORAL at 05:50

## 2019-05-09 RX ADMIN — GUAIFENESIN 600 MG: 600 TABLET, EXTENDED RELEASE ORAL at 19:38

## 2019-05-09 RX ADMIN — SPIRONOLACTONE 100 MG: 100 TABLET ORAL at 08:03

## 2019-05-09 RX ADMIN — HYDRALAZINE HYDROCHLORIDE 75 MG: 50 TABLET, FILM COATED ORAL at 19:38

## 2019-05-09 RX ADMIN — BUDESONIDE AND FORMOTEROL FUMARATE DIHYDRATE 2 PUFF: 160; 4.5 AEROSOL RESPIRATORY (INHALATION) at 21:22

## 2019-05-09 RX ADMIN — HYDROMORPHONE HYDROCHLORIDE 0.5 MG: 1 INJECTION, SOLUTION INTRAMUSCULAR; INTRAVENOUS; SUBCUTANEOUS at 02:34

## 2019-05-09 RX ADMIN — HYDRALAZINE HYDROCHLORIDE 50 MG: 50 TABLET, FILM COATED ORAL at 08:03

## 2019-05-09 RX ADMIN — HYDROCODONE POLISTIREX AND CHLORPHENIRAMINE POLISITREX 5 ML: 10; 8 SUSPENSION, EXTENDED RELEASE ORAL at 11:31

## 2019-05-09 RX ADMIN — LEVOFLOXACIN 750 MG: 750 TABLET, FILM COATED ORAL at 14:44

## 2019-05-09 RX ADMIN — SODIUM CHLORIDE, PRESERVATIVE FREE 10 ML: 5 INJECTION INTRAVENOUS at 08:04

## 2019-05-09 RX ADMIN — ALBUTEROL SULFATE 2 PUFF: 90 AEROSOL, METERED RESPIRATORY (INHALATION) at 06:52

## 2019-05-09 RX ADMIN — HYDROMORPHONE HYDROCHLORIDE 0.5 MG: 1 INJECTION, SOLUTION INTRAMUSCULAR; INTRAVENOUS; SUBCUTANEOUS at 16:54

## 2019-05-09 RX ADMIN — HYDROMORPHONE HYDROCHLORIDE 0.5 MG: 1 INJECTION, SOLUTION INTRAMUSCULAR; INTRAVENOUS; SUBCUTANEOUS at 20:53

## 2019-05-09 RX ADMIN — FUROSEMIDE 40 MG: 10 INJECTION, SOLUTION INTRAMUSCULAR; INTRAVENOUS at 18:00

## 2019-05-09 RX ADMIN — SIROLIMUS 3 MG: 0.5 TABLET, SUGAR COATED ORAL at 08:03

## 2019-05-09 RX ADMIN — HYDRALAZINE HYDROCHLORIDE 75 MG: 50 TABLET, FILM COATED ORAL at 16:51

## 2019-05-09 RX ADMIN — HYDROMORPHONE HYDROCHLORIDE 0.5 MG: 1 INJECTION, SOLUTION INTRAMUSCULAR; INTRAVENOUS; SUBCUTANEOUS at 06:47

## 2019-05-09 RX ADMIN — BUDESONIDE AND FORMOTEROL FUMARATE DIHYDRATE 2 PUFF: 160; 4.5 AEROSOL RESPIRATORY (INHALATION) at 14:49

## 2019-05-09 RX ADMIN — DILTIAZEM HYDROCHLORIDE 120 MG: 120 CAPSULE, COATED, EXTENDED RELEASE ORAL at 14:45

## 2019-05-09 NOTE — H&P
History and physical    Primary care physician  Dr. Mcmahon    Chief complaint  Shortness of breath    History of present illness  63-year-old white male with history of asthma aortic stenosis gastroesophageal reflux disease hepatitis C hypothyroidism chronic atrial fibrillation low back pain hypertension and COPD presented to [facility] emergency with shortness of breath for last several months which is getting worse also have nonproductive cough sore throat with difficulty swallowing and congestion.  Patient denies any chest pain fever chills abdominal pain nausea vomiting diarrhea.  Patient evaluated in ER found to have decompensated CHF and acute sinusitis admit for management.    PAST MEDICAL HISTORY  • Acid reflux     • Aortic stenosis     • Arthritis     • Asthma     • Disease of thyroid gland     • Hepatitis C     • History of atrial fibrillation     • Hypertension     • Low back pain     • Murmur       PAST SURGICAL HISTORY  Surgical History         Past Surgical History:   Procedure Laterality Date   • APPENDECTOMY       • EPIDURAL BLOCK       • INTERVENTIONAL RADIOLOGY PROCEDURE Right 3/22/2017     Procedure: ARTHROCENTESIS ASPIRATION RIGHT KNEE ;  Surgeon: Bennett Resendiz DO;  Location: [facility];  Service:    • KNEE SURGERY Bilateral     • LIVER TRANSPLANTATION         2010 DEC   • LUMBAR DISCECTOMY FUSION INSTRUMENTATION Bilateral 3/22/2017     Procedure: BILATERAL L 4-5 MICROLAMINECTOMY WITH METRIX ;  Surgeon: Bennett Resendiz DO;  Location: [facility] OR;  Service:    • LUMBAR DISCECTOMY FUSION INSTRUMENTATION Bilateral 5/12/2017     Procedure: Minimally invasive transforaminal lumbar interbody fusion lumbar four to lumbar five;  Surgeon: Bennett Resendiz DO;  Location: [facility] OR;  Service:    • SHOULDER SURGERY Right           FAMILY HISTORY  History reviewed. No pertinent family history.     SOCIAL HISTORY  Social History   Social History            Socioeconomic History   • Marital  "status:        Spouse name: Not on file   • Number of children: Not on file   • Years of education: Not on file   • Highest education level: Not on file   Tobacco Use   • Smoking status: Former Smoker       Packs/day: 0.50       Years: 15.00       Pack years: 7.50       Types: Cigarettes       Last attempt to quit:        Years since quittin.3   • Smokeless tobacco: Never Used   Substance and Sexual Activity   • Alcohol use: No   • Drug use: No   • Sexual activity: Defer         ALLERGIES  Lisinopril; Ace inhibitors; Metoprolol; and Penicillins  Home medications reviewed     REVIEW OF SYSTEMS  Review of Systems   Constitutional: Negative for activity change, appetite change and fever.   HENT: Positive for congestion, sore throat and trouble swallowing.    Eyes: Negative.    Respiratory: Positive for shortness of breath. Negative for cough.    Cardiovascular: Negative for chest pain and leg swelling.   Gastrointestinal: Negative for abdominal pain, diarrhea and vomiting.   Endocrine: Negative.    Genitourinary: Negative for decreased urine volume and dysuria.   Musculoskeletal: Negative for neck pain.   Skin: Negative for rash and wound.   Allergic/Immunologic: Negative.    Neurological: Negative for weakness, numbness and headaches.   Hematological: Negative.    Psychiatric/Behavioral: Negative.    All other systems reviewed and are negative.     PHYSICAL EXAM  Blood pressure 165/87, pulse 108, temperature 99.1 °F (37.3 °C), temperature source Oral, resp. rate 17, height 172.7 cm (67.99\"), weight 95.9 kg (211 lb 8 oz), SpO2 91 %.    Constitutional: He is oriented to person, place, and time. No distress.   Head: Normocephalic and atraumatic.   Eyes: EOM are normal. Pupils are equal, round, and reactive to light.   Neck: Normal range of motion. Neck supple.   Cardiovascular: Regular rhythm. Tachycardia present.   Murmur heard.  Pulmonary/Chest: Effort normal and breath sounds normal. No respiratory " distress.   Abdominal: Soft. There is no tenderness. There is no rebound and no guarding.   Musculoskeletal: Normal range of motion. He exhibits edema (2+ BLE).   Neurological: He is alert and oriented to person, place, and time. He has normal sensation and normal strength.   Skin: Skin is warm and dry.   Psychiatric: Mood and affect normal.     LAB RESULTS  Lab Results (last 24 hours)     Procedure Component Value Units Date/Time    Comprehensive Metabolic Panel [[id number]]  (Abnormal) Collected:  05/09/19 0557    Specimen:  Blood Updated:  05/09/19 0702     Glucose 115 mg/dL      BUN 27 mg/dL      Creatinine 1.46 mg/dL      Sodium 132 mmol/L      Potassium 4.6 mmol/L      Chloride 96 mmol/L      CO2 19.8 mmol/L      Calcium 9.6 mg/dL      Total Protein 7.7 g/dL      Albumin 4.00 g/dL      ALT (SGPT) 17 U/L      AST (SGOT) 20 U/L      Alkaline Phosphatase 106 U/L      Total Bilirubin <0.2 mg/dL      eGFR Non African Amer 49 mL/min/1.73      Globulin 3.7 gm/dL      A/G Ratio 1.1 g/dL      BUN/Creatinine Ratio 18.5     Anion Gap 16.2 mmol/L     Narrative:       GFR Normal >60  Chronic Kidney Disease <60  Kidney Failure <15    CBC (No Diff) [[id number]]  (Abnormal) Collected:  05/09/19 0557    Specimen:  Blood Updated:  05/09/19 0633     WBC 7.34 10*3/mm3      RBC 3.42 10*6/mm3      Hemoglobin 9.8 g/dL      Hematocrit 30.2 %      MCV 88.3 fL      MCH 28.7 pg      MCHC 32.5 g/dL      RDW 13.2 %      RDW-SD 42.4 fl      MPV 8.9 fL      Platelets 408 10*3/mm3     D-dimer, Quantitative [[id number]]  (Abnormal) Collected:  05/08/19 1611    Specimen:  Blood Updated:  05/08/19 1638     D-Dimer, Quantitative 1.91 MCGFEU/mL     Narrative:       The Stago D-Dimer test used in conjunction with a clinical pretest probability (PTP) assessment model, has been approved by the FDA to rule out the presence of venous thromboembolism (VTE) in outpatients suspected of deep venous thrombosis (DVT) or pulmonary embolism (PE). The cut-off for  negative predictive value is <0.50 MCGFEU/mL.    Comprehensive Metabolic Panel [[id number]]  (Abnormal) Collected:  05/08/19 1549    Specimen:  Blood Updated:  05/08/19 1627     Glucose 112 mg/dL      BUN 25 mg/dL      Creatinine 1.48 mg/dL      Sodium 135 mmol/L      Potassium 5.2 mmol/L      Chloride 100 mmol/L      CO2 20.6 mmol/L      Calcium 9.8 mg/dL      Total Protein 8.1 g/dL      Albumin 4.00 g/dL      ALT (SGPT) 19 U/L      AST (SGOT) 29 U/L      Alkaline Phosphatase 115 U/L      Total Bilirubin <0.2 mg/dL      eGFR Non African Amer 48 mL/min/1.73      Globulin 4.1 gm/dL      A/G Ratio 1.0 g/dL      BUN/Creatinine Ratio 16.9     Anion Gap 14.4 mmol/L     Narrative:       GFR Normal >60  Chronic Kidney Disease <60  Kidney Failure <15    Troponin [[id number]]  (Normal) Collected:  05/08/19 1549    Specimen:  Blood Updated:  05/08/19 1621     Troponin T <0.010 ng/mL     Narrative:       Troponin T Reference Range:  <= 0.03 ng/mL-   Negative for AMI  >0.03 ng/mL-     Abnormal for myocardial necrosis.  Clinicians would have to utilize clinical acumen, EKG, Troponin and serial changes to determine if it is an Acute Myocardial Infarction or myocardial injury due to an underlying chronic condition.     BNP [[id number]]  (Abnormal) Collected:  05/08/19 1549    Specimen:  Blood Updated:  05/08/19 1618     proBNP 2,137.0 pg/mL     Narrative:       Among patients with dyspnea, NT-proBNP is highly sensitive for the detection of acute congestive heart failure. In addition NT-proBNP of <300 pg/ml effectively rules out acute congestive heart failure with 99% negative predictive value.    CBC & Differential [[id number]] Collected:  05/08/19 1549    Specimen:  Blood Updated:  05/08/19 1600    Narrative:       The following orders were created for panel order CBC & Differential.  Procedure                               Abnormality         Status                     ---------                               -----------          ------                     CBC Auto Differential[[id number]]        Abnormal            Final result                 Please view results for these tests on the individual orders.    CBC Auto Differential [[id number]]  (Abnormal) Collected:  05/08/19 1549    Specimen:  Blood Updated:  05/08/19 1600     WBC 6.25 10*3/mm3      RBC 3.60 10*6/mm3      Hemoglobin 10.4 g/dL      Hematocrit 31.5 %      MCV 87.5 fL      MCH 28.9 pg      MCHC 33.0 g/dL      RDW 13.2 %      RDW-SD 42.3 fl      MPV 9.1 fL      Platelets 430 10*3/mm3      Neutrophil % 81.6 %      Lymphocyte % 7.0 %      Monocyte % 10.4 %      Eosinophil % 0.3 %      Basophil % 0.2 %      Immature Grans % 0.5 %      Neutrophils, Absolute 5.10 10*3/mm3      Lymphocytes, Absolute 0.44 10*3/mm3      Monocytes, Absolute 0.65 10*3/mm3      Eosinophils, Absolute 0.02 10*3/mm3      Basophils, Absolute 0.01 10*3/mm3      Immature Grans, Absolute 0.03 10*3/mm3      nRBC 0.0 /100 WBC         Imaging Results (last 24 hours)     Procedure Component Value Units Date/Time    CT Angiogram Chest With Contrast [[id number]] Collected:  05/08/19 1847     Updated:  05/08/19 1852    Narrative:       CT NECK SOFT TISSUES WITH IV CONTRAST, CT ANGIOGRAM CHEST WITH IV  CONTRAST     HISTORY: 63-year-old male with cough and shortness of air that began in  January. Sore throat with pain with swallowing and nasal congestion.  Left neck swelling.     TECHNIQUE: Neck CT includes axial imaging with intravenous contrast.  Site of swelling was marked with a BB. CT angiogram chest includes axial  imaging from the thoracic inlet to the upper abdomen with IV contrast  and this data was reconstructed in coronal and sagittal planes and  3-dimensional volume rendering was performed.     COMPARISON: There are no previous neck or chest CTs for comparison.     FINDINGS     NECK: A BB was placed at the site of clinical interest and this BB is on  the skin surface within the left lateral submandibular  region.  Underlying this marker there is normal-appearing subcutaneous fat. No  soft tissue mass is evident in this region. There is no evidence for  submandibular/submental kajal enlargement. The parotid glands,  submandibular glands, thyroid gland appear within normal limits. There  is mild thickening of the oropharyngeal lymphoid tissue in Waldeyer's  ring. There is no evidence for abscess or fluid collection. Carotid  vascular calcifications are present of the carotid bulbs and ICA  origins. There is moderate frontal sinus mucosal thickening. Severe  ethmoid and moderate right and mild-to-moderate left maxillary sinus  mucosal thickening is present. There is also mucosal thickening  involving the left sphenoid sinus. Partial opacification is present in  bilateral mastoid air cells. No kajal enlargement is evident. There is  degenerative disc disease in the cervical spine with disc space  narrowing best demonstrated at C5-6 and C6-7.     CT ANGIOGRAM CHEST: Evaluation for embolus in the distal segmental and  subsegmental branch is very limited due to streak artifact as this  patient was scanned with his arms to his side and due to patient's body  type. No central embolus is evident. Heart size is enlarged. Within the  right lower lobe there are 2 adjacent nodules each measuring 5 mm on  image 102. This may be inflammatory or infectious though recommend  follow-up evaluation. There is also an 8 mm nodule within the left upper  lobe on image 83. Imaging through the upper abdomen demonstrates diffuse  fat infiltration of the liver. There is gynecomastia.       Impression:       1. Limited evaluation for pulmonary emboli in the distal segmental and  subsegmental branches. No central embolus is evident. Cardiomegaly.  2. Two right lower lobe 5 mm nodules and an inferior left upper lobe 8  mm nodule for which follow-up evaluation with chest CT is recommended in  4-6 months.   3. At the site of palpable interest in  the  left neck there is no  evidence for underlying soft tissue mass or kajal enlargement. There is  mild circumferential thickening of the oropharyngeal lymphoid tissue  without abscess or fluid collection.   4. Carotid atherosclerotic calcifications.   5. Degenerative disc disease in the cervical spine.  6. Inflammatory paranasal sinus disease. Partial opacification bilateral  mastoid air cells.     Discussed with Dr. Garnett in the emergency department 05/08/2019 at  5:35 PM     Radiation dose reduction techniques were utilized, including automated  exposure control and exposure modulation based on body size.     This report was finalized on 5/8/2019 6:48 PM by Dr. Rojas Andres M.D.       CT Soft Tissue Neck With Contrast [[id number]] Collected:  05/08/19 1847     Updated:  05/08/19 1852    Narrative:       CT NECK SOFT TISSUES WITH IV CONTRAST, CT ANGIOGRAM CHEST WITH IV  CONTRAST     HISTORY: 63-year-old male with cough and shortness of air that began in  January. Sore throat with pain with swallowing and nasal congestion.  Left neck swelling.     TECHNIQUE: Neck CT includes axial imaging with intravenous contrast.  Site of swelling was marked with a BB. CT angiogram chest includes axial  imaging from the thoracic inlet to the upper abdomen with IV contrast  and this data was reconstructed in coronal and sagittal planes and  3-dimensional volume rendering was performed.     COMPARISON: There are no previous neck or chest CTs for comparison.     FINDINGS     NECK: A BB was placed at the site of clinical interest and this BB is on  the skin surface within the left lateral submandibular region.  Underlying this marker there is normal-appearing subcutaneous fat. No  soft tissue mass is evident in this region. There is no evidence for  submandibular/submental kajal enlargement. The parotid glands,  submandibular glands, thyroid gland appear within normal limits. There  is mild thickening of the oropharyngeal  lymphoid tissue in Waldeyer's  ring. There is no evidence for abscess or fluid collection. Carotid  vascular calcifications are present of the carotid bulbs and ICA  origins. There is moderate frontal sinus mucosal thickening. Severe  ethmoid and moderate right and mild-to-moderate left maxillary sinus  mucosal thickening is present. There is also mucosal thickening  involving the left sphenoid sinus. Partial opacification is present in  bilateral mastoid air cells. No kajal enlargement is evident. There is  degenerative disc disease in the cervical spine with disc space  narrowing best demonstrated at C5-6 and C6-7.     CT ANGIOGRAM CHEST: Evaluation for embolus in the distal segmental and  subsegmental branch is very limited due to streak artifact as this  patient was scanned with his arms to his side and due to patient's body  type. No central embolus is evident. Heart size is enlarged. Within the  right lower lobe there are 2 adjacent nodules each measuring 5 mm on  image 102. This may be inflammatory or infectious though recommend  follow-up evaluation. There is also an 8 mm nodule within the left upper  lobe on image 83. Imaging through the upper abdomen demonstrates diffuse  fat infiltration of the liver. There is gynecomastia.       Impression:       1. Limited evaluation for pulmonary emboli in the distal segmental and  subsegmental branches. No central embolus is evident. Cardiomegaly.  2. Two right lower lobe 5 mm nodules and an inferior left upper lobe 8  mm nodule for which follow-up evaluation with chest CT is recommended in  4-6 months.   3. At the site of palpable interest in  the left neck there is no  evidence for underlying soft tissue mass or kajal enlargement. There is  mild circumferential thickening of the oropharyngeal lymphoid tissue  without abscess or fluid collection.   4. Carotid atherosclerotic calcifications.   5. Degenerative disc disease in the cervical spine.  6. Inflammatory  paranasal sinus disease. Partial opacification bilateral  mastoid air cells.     Discussed with Dr. Garnett in the emergency department 05/08/2019 at  5:35 PM     Radiation dose reduction techniques were utilized, including automated  exposure control and exposure modulation based on body size.     This report was finalized on 5/8/2019 6:48 PM by Dr. Rojas Andres M.D.       XR Chest 2 View [[id number]] Collected:  05/08/19 1604     Updated:  05/08/19 1609    Narrative:       XR CHEST 2 VW-     HISTORY: Male who is 63 years-old,  short of breath     TECHNIQUE: Frontal and lateral views of the chest     COMPARISON: 03/16/2017     FINDINGS: Heart, mediastinum and pulmonary vasculature are unremarkable.  No focal pulmonary consolidation, pleural effusion, or pneumothorax. No  acute osseous process.       Impression:       No evidence for acute pulmonary process. Follow-up as  clinical indications persist.     This report was finalized on 5/8/2019 4:06 PM by Dr. Ernesto Carver M.D.           ECG 12 Lead   Order: [id number]   Status:  Final result   Visible to patient:  No (Not Released) Next appt:  None      Narrative     HEART RATE= 104  bpm  RR Interval= 580  ms  CT Interval= 165  ms  P Horizontal Axis= 9  deg  P Front Axis= 55  deg  QRSD Interval= 93  ms  QT Interval= 332  ms  QRS Axis= 10  deg  T Wave Axis= 72  deg  - ABNORMAL ECG -  Sinus tachycardia  POOR R WAVE PROGRESSION  NO SIGNIFICANT CHANGE FROM PREVIOUS ECG             Current Facility-Administered Medications:   •  albuterol sulfate HFA (PROVENTIL HFA;VENTOLIN HFA;PROAIR HFA) inhaler 2 puff, 2 puff, Inhalation, Q4H PRN, Kartik Arriaga MD, 2 puff at 05/09/19 0652  •  aspirin chewable tablet 81 mg, 81 mg, Oral, Daily, Kartik Arriaga MD  •  atorvastatin (LIPITOR) tablet 10 mg, 10 mg, Oral, Nightly, Kartik Arriaga MD  •  furosemide (LASIX) injection 40 mg, 40 mg, Intravenous, Q12H, Kartik Arriaga MD, 40 mg at 05/09/19 0550  •  hydrALAZINE (APRESOLINE)  tablet 50 mg, 50 mg, Oral, TID, Wolfgang Arriaga MD, 50 mg at 05/09/19 0803  •  HYDROcod Polst-CPM Polst ER (TUSSIONEX PENNKINETIC) 10-8 MG/5ML ER suspension 5 mL, 5 mL, Oral, BID PRN, Wolfgang Arriaga MD, 5 mL at 05/09/19 1131  •  HYDROmorphone (DILAUDID) injection 0.5 mg, 0.5 mg, Intravenous, Q4H PRN, Wolfgang Arriaga MD, 0.5 mg at 05/09/19 0647  •  ipratropium-albuterol (DUO-NEB) nebulizer solution 3 mL, 3 mL, Nebulization, Q4H PRN, Wolfgang Arriaga MD  •  levothyroxine (SYNTHROID, LEVOTHROID) tablet 25 mcg, 25 mcg, Oral, Q AM, Wolfgang Arriaga MD, 25 mcg at 05/09/19 0550  •  montelukast (SINGULAIR) tablet 10 mg, 10 mg, Oral, Nightly, Wolfgang Arriaga MD, 10 mg at 05/08/19 2309  •  ondansetron (ZOFRAN) injection 4 mg, 4 mg, Intravenous, Q6H PRN, Wolfgang Arriaga MD  •  pantoprazole (PROTONIX) EC tablet 40 mg, 40 mg, Oral, Q AM, Wolfagng Arriaga MD, 40 mg at 05/09/19 0550  •  phenol (CHLORASEPTIC) 1.4 % liquid 2 spray, 2 spray, Mouth/Throat, Q2H PRN, Wolfgang Arriaga MD  •  sirolimus (RAPAMUNE) tablet 3 mg, 3 mg, Oral, Daily, Wolfgang Arriaga MD, 3 mg at 05/09/19 0803  •  [COMPLETED] Insert peripheral IV, , , Once **AND** sodium chloride 0.9 % flush 10 mL, 10 mL, Intravenous, PRN, Mike Garnett MD, 10 mL at 05/09/19 0804  •  spironolactone (ALDACTONE) tablet 100 mg, 100 mg, Oral, Daily, Wolfgang Arriaga MD, 100 mg at 05/09/19 0803     ASSESSMENT  Acute on chronic diastolic CHF  COPD/asthma  Lung nodules  Aortic stenosis  Hypertension  Hyperlipidemia  Hypothyroidism  Gastroesophageal reflux disease    PLAN  Admit  Supplemental oxygen nebulizer  IV diuresis with strict I's and O's and daily weight  Check 2D echo  Cardiology and pulmonary to follow patient  Empiric antibiotics for sinusitis  Continue home medications  Stress ulcer DVT prophylaxis  Supportive care  Symptomatic treatment for cough congestion  Patient is full code  Follow closely further recommendation according to hospital course    WOLFGANG ARRIAGA MD

## 2019-05-09 NOTE — THERAPY EVALUATION
Acute Care - Speech Language Pathology   Swallow Initial Evaluation [facility]     Patient Name: Milton Figueroa  : 1955  MRN: [id number]  Today's Date: 2019               Admit Date: 2019    Visit Dx:     ICD-10-CM ICD-9-CM   1. Acute congestive heart failure, unspecified heart failure type (CMS/HCC) I50.9 428.0   2. Acute pansinusitis, recurrence not specified J01.40 461.8     Patient Active Problem List   Diagnosis   • HTN (hypertension)   • Asthma   • Hepatitis C   • Acute congestive heart failure (CMS/HCC)   • Acute sinusitis   • History of atrial fibrillation   • Aortic stenosis   • Liver transplant recipient (CMS/HCC)   • Immunosuppressed status (CMS/HCC)   • CKD (chronic kidney disease) stage 3, GFR 30-59 ml/min (CMS/HCC)     Past Medical History:   Diagnosis Date   • Acid reflux    • Aortic stenosis    • Arthritis    • Asthma    • CKD (chronic kidney disease) stage 3, GFR 30-59 ml/min (CMS/HCC) 2019   • Hepatitis C     HAD LIVER TRANSPLANT    • History of atrial fibrillation    • Hypertension    • Hypothyroidism    • Immunosuppressed status (CMS/HCC)    • Liver transplant recipient (CMS/HCC)    • Low back pain     PAIN DOWN BOTH LEGS     Past Surgical History:   Procedure Laterality Date   • APPENDECTOMY     • EPIDURAL BLOCK     • INTERVENTIONAL RADIOLOGY PROCEDURE Right 3/22/2017    Procedure: ARTHROCENTESIS ASPIRATION RIGHT KNEE ;  Surgeon: Bennett Resendiz DO;  Location: [facility];  Service:    • KNEE SURGERY Bilateral    • LIVER TRANSPLANTATION      2010 DEC   • LUMBAR DISCECTOMY FUSION INSTRUMENTATION Bilateral 3/22/2017    Procedure: BILATERAL L 4-5 MICROLAMINECTOMY WITH METRIX ;  Surgeon: Bennett Resendiz DO;  Location: [facility] OR;  Service:    • LUMBAR DISCECTOMY FUSION INSTRUMENTATION Bilateral 2017    Procedure: Minimally invasive transforaminal lumbar interbody fusion lumbar four to lumbar five;  Surgeon: Bennett Resendiz DO;  Location: [facility] OR;  Service:    •  SHOULDER SURGERY Right         SWALLOW EVALUATION (last 72 hours)      SLP Adult Swallow Evaluation     Row Name 05/09/19 4721                   Rehab Evaluation    Document Type  evaluation  -        Subjective Information  complains of;pain  -        Patient Observations  alert;cooperative  -           General Information    Patient Profile Reviewed  yes  -        Pertinent History Of Current Problem  Acute congestive heart failure, odynophagia, dysphagia, CT revealing swollen tissue  -        Current Method of Nutrition  regular textures;thin liquids  -        Precautions/Limitations, Vision  WFL;for purposes of eval  -        Precautions/Limitations, Hearing  WFL;for purposes of eval  -        Prior Level of Function-Communication  WFL  -        Prior Level of Function-Swallowing  no diet consistency restrictions  -        Plans/Goals Discussed with  patient;agreed upon  -        Barriers to Rehab  none identified  -        Patient's Goals for Discharge  eat/drink without coughing/choking  -           Pain Assessment    Additional Documentation  Pain Scale: Numbers Pre/Post-Treatment (Group)  -           Pain Scale: Numbers Pre/Post-Treatment    Pain Scale: Numbers, Pretreatment  10/10  -        Pain Scale: Numbers, Post-Treatment  10/10  -        Pain Location  throat  -           Oral Motor and Function    Volitional Swallow  delayed;weak  -        Volitional Cough  WFL  -           Oral Musculature and Cranial Nerve Assessment    Oral Motor General Assessment  mandibular impairment  -        Oral Motor, Comment  reduced mouth opening (two fingers)  -           Clinical Swallow Eval    Oral Prep Phase  WFL  -        Oral Transit  impaired  -        Oral Residue  WFL  -        Pharyngeal Phase  suspected pharyngeal impairment  -        Clinical Swallow Evaluation Summary  Pt seen for bedside swallow. Pt c/o dysphagia with secretions, liquids, and solids. Pt also  "complaining of a cough when speaking and choking on secretions when beginning to fall asleep. Voice clear. Reduced mouth opening appreciated. ENT has been consulted per RN d/t CT neck revealing \"mild thickening of the oropharyngeal lymphoid tissue in Waldeyer's Ring\". Dry swallow revealed reduced laryngeal elevation. Pt with grimace during the swallow with moaning post swallow. Oral holding observed across trials. Munching mastication pattern with mech soft. No s/s of asp noted across trials of thins, puree, mech soft. Despite reduced elevation, multiple swallows not appreciated. Pt is exhibiting signs of dysphagia, but no s/s of aspiration noted this date. Pt refused diet modifications. SLP educated patient on which foods would be easiest to swallow d/t pain and reduced elevation. SLP to follow for diet tolerance and further assessment as indicated.   -           Clinical Impression    SLP Swallowing Diagnosis  suspected pharyngeal dysfunction  -        Functional Impact  risk of aspiration/pneumonia  -        Rehab Potential/Prognosis, Swallowing  good, to achieve stated therapy goals  -        Swallow Criteria for Skilled Therapeutic Interventions Met  demonstrates skilled criteria  -           Recommendations    Therapy Frequency (Swallow)  PRN  -        Predicted Duration Therapy Intervention (Days)  until discharge  -        SLP Diet Recommendation  other (see comments) soft/chopped, pt refused  -        Recommended Diagnostics  reassess via clinical swallow evaluation  -        Recommended Precautions and Strategies  upright posture during/after eating;small bites of food and sips of liquid;multiple swallows per bite of food;multiple swallows per sip of liquid  -        SLP Rec. for Method of Medication Administration  meds whole;meds crushed;with thin liquids;with pudding or applesauce;as tolerated  -        Monitor for Signs of Aspiration  yes;notify SLP if any concerns  -        " Anticipated Dischage Disposition  home  -           Swallow Goals (SLP)    Oral Nutrition/Hydration Goal Selection (SLP)  oral nutrition/hydration, SLP goal 1  -           Oral Nutrition/Hydration Goal 1 (SLP)    Oral Nutrition/Hydration Goal 1, SLP  Pt will tolerate PO without overt s/s of asp.  -        Time Frame (Oral Nutrition/Hydration Goal 1, SLP)  by discharge  -          User Key  (r) = Recorded By, (t) = Taken By, (c) = Cosigned By    Initials Name Effective Dates     Tk Silvina JAMES, MS CCC-SLP 03/07/18 -           EDUCATION  The patient has been educated in the following areas:   Dysphagia (Swallowing Impairment).    SLP Recommendation and Plan  SLP Swallowing Diagnosis: suspected pharyngeal dysfunction  SLP Diet Recommendation: other (see comments)(soft/chopped, pt refused)  Recommended Precautions and Strategies: upright posture during/after eating, small bites of food and sips of liquid, multiple swallows per bite of food, multiple swallows per sip of liquid  SLP Rec. for Method of Medication Administration: meds whole, meds crushed, with thin liquids, with pudding or applesauce, as tolerated     Monitor for Signs of Aspiration: yes, notify SLP if any concerns  Recommended Diagnostics: reassess via clinical swallow evaluation  Swallow Criteria for Skilled Therapeutic Interventions Met: demonstrates skilled criteria  Anticipated Dischage Disposition: home  Rehab Potential/Prognosis, Swallowing: good, to achieve stated therapy goals  Therapy Frequency (Swallow): PRN  Predicted Duration Therapy Intervention (Days): until discharge       Plan of Care Reviewed With: patient  Plan of Care Review  Plan of Care Reviewed With: patient  Outcome Summary: Pt seen for bedside swallow. Pt c/o dysphagia with secretions, liquids, and solids. Pt also complaining of a cough when speaking and choking on secretions when beginning to fall asleep. Voice clear. Reduced mouth opening appreciated. ENT has been  consulted per RN d/t CT neck revealing swollen lymph nodes. Dry swallow revealed reduced laryngeal elevation. Pt with grimace during the swallow with moaning post swallow. Oral holding observed across trials. Munching mastication pattern with mech soft. No s/s of asp noted across trials of thins, puree, mech soft. Despite reduced elevation, multiple swallows not appreciated. Pt is exhibiting signs of dysphagia, but no s/s of aspiration noted this date. Pt refused diet modifications. SLP educated patient on which foods would be easiest to swallow d/t pain and reduced elevation. SLP to follow.     SLP GOALS     Row Name 05/09/19 1345             Oral Nutrition/Hydration Goal 1 (SLP)    Oral Nutrition/Hydration Goal 1, SLP  Pt will tolerate PO without overt s/s of asp.  -      Time Frame (Oral Nutrition/Hydration Goal 1, SLP)  by discharge  -        User Key  (r) = Recorded By, (t) = Taken By, (c) = Cosigned By    Initials Name Provider Type     Silvina Frey MS CCC-SLP Speech and Language Pathologist           SLP Outcome Measures (last 72 hours)      SLP Outcome Measures     Row Name 05/09/19 1400             SLP Outcome Measures    Outcome Measure Used?  Adult NOMS  -         Adult FCM Scores    FCM Chosen  Swallowing  -      Swallowing FCM Score  5  -        User Key  (r) = Recorded By, (t) = Taken By, (c) = Cosigned By    Initials Name Effective Dates     Silvina Frey MS CCC-SLP 03/07/18 -            Time Calculation:   Time Calculation- SLP     Row Name 05/09/19 1439             Time Calculation- [facility]    SLP Start Time  1345  -      SLP Received On  05/09/19  -        User Key  (r) = Recorded By, (t) = Taken By, (c) = Cosigned By    Initials Name Provider Type     Silvina Frey MS CCC-SLP Speech and Language Pathologist          Therapy Charges for Today     Code Description Service Date Service Provider Modifiers Qty    [id number]  ST EVAL ORAL PHARYNG SWALLOW 3 5/9/2019 Silvina Frey  MS CCC-SLP GN 1               Silvina Frey, MS LEE-SLP  5/9/2019

## 2019-05-09 NOTE — CONSULTS
"Adult Nutrition  Assessment/PES    Patient Name:  Milton Figueroa  YOB: 1955  MRN: [id number]  Admit Date:  5/8/2019    Assessment Date:  5/9/2019    Comments:  Consult d/t MST score of 4 per nurse admission screen.  Admitted with cough, SOA since January. Sore throat and pain with swallowing for about a week per patient report.  Acute CHF.    Patient reports appetite fine, just hard to swallow some things d/t pain.  Reports eating what he can.  Discussed softer foods and showed him always available section of menu for him to order off of.  Reports a \"little bit\" of weight loss, but says he needed to lose weight - doesn't elaborate further.    Adding yogurt and pudding with protein powder to promote increased protein intake.    Will continue to follow.    Reason for Assessment     Row Name 05/09/19 1345          Reason for Assessment    Reason For Assessment  nurse/nurse practitioner consult;identified at risk by screening criteria     Diagnosis  cardiac disease;pulmonary disease;liver disease;gastrointestinal disease;endocrine conditions HTN, asthma, hep C, reflux, arthritis, thryoid d/o, aortic stenosis; adm with acute CHF     Identified At Risk by Screening Criteria  MST SCORE 2+;difficulty chewing/swallowing;reduced oral intake over the last month;unintentional loss of 10 lbs or more in the past 2 mos         Nutrition/Diet History     Row Name 05/09/19 1346          Nutrition/Diet History    Typical Food/Fluid Intake  patient reports appetite not bad, just hard to eat d/t sore throat and pain when swallowing (x about a week), cough/SOA since January         Anthropometrics     Row Name 05/09/19 1346 05/09/19 0608       Anthropometrics    Weight  --  95.9 kg (211 lb 8 oz)       Admit Weight    Admit Weight  -- 2113 5/9  --       Usual Body Weight (UBW)    Weight Loss Time Frame  reports he thinks a little bit of weight loss, but says he needed to lose weight  --       Body Mass Index (BMI)    BMI " Assessment  BMI 30-34.9: obesity grade I  --        Labs/Tests/Procedures/Meds     Row Name 05/09/19 1347          Labs/Procedures/Meds    Lab Results Reviewed  reviewed, pertinent     Lab Results Comments  Gluc, Na+, Creat, BUN, GFR, Hgb, Hct        Diagnostic Tests/Procedures    Diagnostic Test/Procedure Reviewed  reviewed, pertinent        Medications    Pertinent Medications Reviewed  reviewed, pertinent     Pertinent Medications Comments  lasix, synthroid, protonix         Physical Findings     Row Name 05/09/19 1348          Physical Findings    Overall Physical Appearance  obese     Skin  -- B=19, intact         Estimated/Assessed Needs     Row Name 05/09/19 1348          Calculation Measurements    Weight Used For Calculations  95.9 kg (211 lb 6.7 oz)        Estimated/Assessed Needs    Additional Documentation  KCAL/KG (Group);Protein Requirements (Group);Fluid Requirements (Group)        KCAL/KG    14 Kcal/Kg (kcal)  1342.6     15 Kcal/Kg (kcal)  1438.5     18 Kcal/Kg (kcal)  1726.2     20 Kcal/Kg (kcal)  1918     25 Kcal/Kg (kcal)  2397.5     30 Kcal/Kg (kcal)  2877     35 Kcal/Kg (kcal)  3356.5     40 Kcal/Kg (kcal)  3836     45 Kcal/Kg (kcal)  4315.5     50 Kcal/Kg (kcal)  4795     kcal/kg (Specify)  -- 1918        [place]-St. Jeor Equation    RMR ([place]-St. Jeor Equation)  1728.37        Protein Requirements    Est Protein Requirement Amount (gms/kg)  1.0 gm protein     Estimated Protein Requirements (gms/day)  95.9        Fluid Requirements    Estimated Fluid Requirements (mL/day)  2400     Estimated Fluid Requirement Method  RDA Method     RDA Method (mL)  2400     Keegan-Otoniel Method (over 20 kg)  3418         Nutrition Prescription Ordered     Row Name 05/09/19 1348          Nutrition Prescription PO    Current PO Diet  Regular         Evaluation of Received Nutrient/Fluid Intake     Row Name 05/09/19 1349 05/09/19 1348       Calculation Measurements    Weight Used For Calculations  --  95.9 kg  (211 lb 6.7 oz)       PO Evaluation    Number of Days PO Intake Evaluated  Insufficient Data  --        Evaluation of Prescribed Nutrient/Fluid Intake     Row Name 05/09/19 1348          Calculation Measurements    Weight Used For Calculations  95.9 kg (211 lb 6.7 oz)             Problem/Interventions:  Problem 1     Row Name 05/09/19 1349          Nutrition Diagnoses Problem 1    Problem 1  Predicted Suboptimal Intake     Etiology (related to)  Factors Affecting Nutrition;Medical Diagnosis     Cardiac  CHF acute CHF     Other  pain with swallowing, sore throat, SOA, cough     Signs/Symptoms (evidenced by)  Report/Observation     Reported/Observed By  Patient                 Intervention Goal     Row Name 05/09/19 1350          Intervention Goal    General  Maintain nutrition;Reduce/improve symptoms;Disease management/therapy     PO  Establish PO;Tolerate PO;PO intake (%)     PO Intake %  75 %     Weight  Appropriate weight loss         Nutrition Intervention     Row Name 05/09/19 1354          Nutrition Intervention    RD/Tech Action  Follow Tx progress;Care plan reviewd;Encourage intake;Recommend/ordered     Recommended/Ordered  Supplement         Nutrition Prescription     Row Name 05/09/19 1352          Nutrition Prescription PO    PO Prescription  Begin/change supplement     Supplement  Yogurt;PRO powder     Supplement Frequency  2 times a day     New PO Prescription Ordered?  Yes         Education/Evaluation     Row Name 05/09/19 4752          Education    Education  Will Instruct as appropriate        Monitor/Evaluation    Monitor  Per protocol;PO intake;Pertinent labs;Weight;Symptoms           Electronically signed by:  Joan Gao RD  05/09/19 1:55 PM

## 2019-05-09 NOTE — PROGRESS NOTES
Continued Stay Note  [facility]     Patient Name: Milton Figueroa  MRN: [id number]  Today's Date: 5/9/2019    Admit Date: 5/8/2019    Discharge Plan     Row Name 05/09/19 0913       Plan    Plan  Plan home.   MELONIE Aviles RN    Patient/Family in Agreement with Plan  yes    Plan Comments  FACE SHEET VERIFIED/ IM LETTER SIGNED.  Spoke to pt at bedside.  PT's PCP is Dr. Mcmahon.  Pt lives in a single story house with daughter ( Lise Figueroa [phone]) and grandchildren  ( Pa and Nilesh).  Pt is independent with ADL's.  Pt has a nebulizer for home use.  Pt gets prescriptions at [facility]) and [facility].  Pt states he sometimes has issues affording medications. Pt is agreeable to enrolling in the Meds to Bed.  Pt is not current with HH.  Pt has not been in SNF.  Pt denies any discharge needs.  Plan home. MELONIE Aviles RN        Discharge Codes    No documentation.             Luciana Aviles RN

## 2019-05-09 NOTE — PLAN OF CARE
Problem: Patient Care Overview  Goal: Plan of Care Review  Outcome: Ongoing (interventions implemented as appropriate)   05/09/19 1502   Coping/Psychosocial   Plan of Care Reviewed With patient   Plan of Care Review   Progress improving   OTHER   Outcome Summary pt a&o, up ad carlin, rested in bed throughout this shift, c/o pain, PRN IV pain meds given, patient continues to struggle to open his mouth very wide, consults called in, VSS, will continue to monitor.

## 2019-05-09 NOTE — PLAN OF CARE
Problem: Patient Care Overview  Goal: Plan of Care Review  Outcome: Ongoing (interventions implemented as appropriate)   05/09/19 0358   Coping/Psychosocial   Plan of Care Reviewed With patient   Plan of Care Review   Progress no change   OTHER   Outcome Summary pt cont to  take sips of water and some ice chips, prn dialudid given iv twice this shift for c/o pain to throat , received some relief per pt. frequent congested cough with white sputum noted. up to br x3 to void. vss, nad. meds given with slight difficulty swallowing.      Goal: Individualization and Mutuality  Outcome: Ongoing (interventions implemented as appropriate)    Goal: Discharge Needs Assessment  Outcome: Ongoing (interventions implemented as appropriate)    Goal: Interprofessional Rounds/Family Conf  Outcome: Ongoing (interventions implemented as appropriate)      Problem: Cardiac: Heart Failure (Adult)  Goal: Signs and Symptoms of Listed Potential Problems Will be Absent, Minimized or Managed (Cardiac: Heart Failure)  Outcome: Ongoing (interventions implemented as appropriate)      Problem: Pain, Acute (Adult)  Goal: Identify Related Risk Factors and Signs and Symptoms  Outcome: Ongoing (interventions implemented as appropriate)    Goal: Acceptable Pain Control/Comfort Level  Outcome: Ongoing (interventions implemented as appropriate)      Problem: Skin Injury Risk (Adult)  Goal: Identify Related Risk Factors and Signs and Symptoms  Outcome: Ongoing (interventions implemented as appropriate)    Goal: Skin Health and Integrity  Outcome: Ongoing (interventions implemented as appropriate)

## 2019-05-09 NOTE — CONSULTS
[facility]    Reason for Consult: lung nodules    HPI:  Mr. Figueroa is a 64yo WM with a history of asthma/copd overlap and aortic stenosis. He presented to the ER with gradual onset of severe sore throat.  He says it has been brought on by months of coughing and drainage.  He reports it is worse swallowing things and he reports it is severe.  He has a history of mild intermittent asthma and multiple allergies and says he gets respiratory infections a couple times a year but they usually clear up uneventfully.  He says this year, he has had continued severe cough and drainage.  He has some nasal/sinus congestion and drainage.  He says he has a nasal rinse that seems to provide relief for about ten minutes.  He has a prn albuterol inhaler that seems to help some as well.  He has been admitted here and a ct chest was obtained, he has some scatter lung nodules the largest being 8mm in size.  He does not appear to have any acute pneumonia.      Past Medical History:   Diagnosis Date   • Acid reflux    • Aortic stenosis    • Arthritis    • Asthma    • CKD (chronic kidney disease) stage 3, GFR 30-59 ml/min (CMS/[facility]) 2019   • Hepatitis C     HAD LIVER TRANSPLANT    • History of atrial fibrillation    • Hypertension    • Hypothyroidism    • Immunosuppressed status (CMS/[facility])    • Liver transplant recipient (CMS/[facility])    • Low back pain     PAIN DOWN BOTH LEGS     Social History     Socioeconomic History   • Marital status:      Spouse name: Not on file   • Number of children: Not on file   • Years of education: Not on file   • Highest education level: Not on file   Tobacco Use   • Smoking status: Former Smoker     Packs/day: 0.50     Years: 15.00     Pack years: 7.50     Types: Cigarettes     Last attempt to quit: 2007     Years since quittin.3   • Smokeless tobacco: Never Used   Substance and Sexual Activity   • Alcohol use: No   • Drug use: No   • Sexual activity: Defer     History reviewed.  No pertinent family history.  Meds; reviewed  All; list of 4 reviewed as per chart  Ros;  Constitutional: Negative for activity change, appetite change and fever.   HENT: Positive for congestion, sore throat and trouble swallowing.    Eyes: Negative.    Respiratory: Positive for shortness of breath. Negative for cough.    Cardiovascular: Negative for chest pain and leg swelling.   Gastrointestinal: Negative for abdominal pain, diarrhea and vomiting.   Endocrine: Negative.    Genitourinary: Negative for decreased urine volume and dysuria.   Musculoskeletal: Negative for neck pain.   Skin: Negative for rash and wound.   Allergic/Immunologic: Negative.    Neurological: Negative for weakness, numbness and headaches.   Hematological: Negative.    Psychiatric/Behavioral: Negative.    All other systems reviewed and are negative.    Vital Sign Min/Max for last 24 hours  Temp  Min: 98.1 °F (36.7 °C)  Max: 99.3 °F (37.4 °C)   BP  Min: 158/82  Max: 178/93   Pulse  Min: 96  Max: 117   Resp  Min: 16  Max: 19   SpO2  Min: 90 %  Max: 99 %   No Data Recorded   Weight  Min: 95.9 kg (211 lb 8 oz)  Max: 98 kg (216 lb)     GEN:   appears ill, , AxOx3, voice sounds a little hoarse and congested  HEENT: PERRL, EOMI, no icterus, mmm, no jvd, trachea midline, neck supple, no palpable thyroid nodules  CHEST: good ae and CTA bilat, no wheezes, no crackles, no use of accessory muscles  CV: RRR, no m/g/r  ABD: soft, nt, nd +bs, no hepatosplenomegaly  EXT: no c/c/e  SKIN: no rashes, no xanthomas, nl turgor  LYMPH: no palpable cervical or supraclavicular lymphadenopathy  NEURO: CN 2-12 intact and symmetric bilaterally  PSYCH: nl affect, nl orientation, nl judgement, nl mood  MSK: no kyphoscoliosis, 5/5 strength ue and le bilaterally good rom in bilaterall ue and le     Labs: 5/9 reviewed  Wbc 7.34  hgb 9.8  plts 408  Bun 27  Cr 1.46  Na 132  Bicarb 19    Ct chest; reviewed myself, agree with dicated report    A/P:  1. Chronic cough -- multiple  possible etiologies.  He really sounds very clear right now, does not appear to be wheezing.  I would agree with symbicort.  He does  Seem to have a good deal of nasal congestion, possibly this is upper airway cough.  GERD might be playing a role as well and I would agree with ppi.    2. Asthma -- does not appear acutely exacerbated, will check a viral respiratory panel  3. Lung nodules -- repeat ct in 3 months given the 8mm nodule per fleischner 2017 society guidelines  4. Anemia  5. Hyponatremia  6. Aortic stenosis  7. gerd

## 2019-05-09 NOTE — PROGRESS NOTES
Discharge Planning Assessment  [facility]     Patient Name: Milton Figueroa  MRN: [id number]  Today's Date: 5/9/2019    Admit Date: 5/8/2019    Discharge Needs Assessment     Row Name 05/09/19 0911       Living Environment    Lives With  child(meenakshi), adult;grandchild(meenakshi)    Name(s) of Who Lives With Patient  Daughter   ( Lise Figueroa [phone]) and grandchildren  ( Robert)    Current Living Arrangements  home/apartment/condo    Primary Care Provided by  self    Provides Primary Care For  no one    Family Caregiver if Needed  child(meenakshi), adult    Family Caregiver Names  Daughter   ( Lise Figueroa [phone])     Quality of Family Relationships  involved;supportive    Able to Return to Prior Arrangements  yes    Living Arrangement Comments  Pt lives in a single story house with daughter   ( Lise Figueroa [phone]) and grandchildren  ( Robert)       Resource/Environmental Concerns    Resource/Environmental Concerns  none    Transportation Concerns  car, none       Transition Planning    Patient/Family Anticipates Transition to  home with family    Patient/Family Anticipated Services at Transition  none    Transportation Anticipated  car, drives self;family or friend will provide       Discharge Needs Assessment    Concerns to be Addressed  denies needs/concerns at this time    Equipment Currently Used at Home  nebulizer    Anticipated Changes Related to Illness  none    Equipment Needed After Discharge  nebulizer    Offered/Gave Vendor List  no        Discharge Plan     Row Name 05/09/19 0913       Plan    Plan  Plan home.   MELONIE Aviles RN    Patient/Family in Agreement with Plan  yes    Plan Comments  FACE SHEET VERIFIED/ IM LETTER SIGNED.  Spoke to pt at bedside.  PT's PCP is Dr. Mcmahon.  Pt lives in a single story house with daughter ( Lise Figueroa [phone]) and grandchildren  ( Robert).  Pt is independent with ADL's.  Pt has a nebulizer for home use.  Pt gets  prescriptions at [facility]) and [facility].  Pt states he sometimes has issues affording medications. Pt is agreeable to enrolling in the Meds to Bed.  Pt is not current with HH.  Pt has not been in SNF.  Pt denies any discharge needs.  Plan home. MELONIE Aviles, RN        Destination      No service coordination in this encounter.      Durable Medical Equipment      No service coordination in this encounter.      Dialysis/Infusion      No service coordination in this encounter.      Home Medical Care      No service coordination in this encounter.      Therapy      No service coordination in this encounter.      Community Resources      No service coordination in this encounter.          Demographic Summary     Row Name 05/09/19 0911       General Information    Admission Type  inpatient    Arrived From  emergency department    Required Notices Provided  Important Message from Medicare    Referral Source  admission list    Reason for Consult  discharge planning    Preferred Language  English     Used During This Interaction  no        Functional Status     Row Name 05/09/19 0911       Functional Status    Usual Activity Tolerance  good    Current Activity Tolerance  moderate       Functional Status, IADL    Medications  independent    Meal Preparation  independent    Housekeeping  independent    Laundry  independent    Shopping  independent       Mental Status    General Appearance WDL  WDL       Mental Status Summary    Recent Changes in Mental Status/Cognitive Functioning  no changes        Psychosocial    No documentation.       Abuse/Neglect    No documentation.       Legal    No documentation.       Substance Abuse    No documentation.       Patient Forms    No documentation.           Luciana Aviles, RN

## 2019-05-09 NOTE — PLAN OF CARE
"Problem: Patient Care Overview  Goal: Plan of Care Review   05/09/19 0145   Coping/Psychosocial   Plan of Care Reviewed With patient   OTHER   Outcome Summary Pt seen for bedside swallow. Pt c/o dysphagia with secretions, liquids, and solids. Pt also complaining of a cough when speaking and choking on secretions when beginning to fall asleep. Voice clear. Reduced mouth opening appreciated. ENT has been consulted per RN d/t CT neck revealing \"mild thickening of the oropharyngeal lymphoid tissue in Waldeyer's Ring\". Dry swallow revealed reduced laryngeal elevation. Pt with grimace during the swallow with moaning post swallow. Oral holding observed across trials. Munching mastication pattern with mech soft. No s/s of asp noted across trials of thins, puree, mech soft. Despite reduced elevation, multiple swallows not appreciated. Pt is exhibiting signs of dysphagia, but no s/s of aspiration noted this date. Pt refused diet modifications. SLP educated patient on which foods would be easiest to swallow d/t pain and reduced elevation. SLP to follow for diet tolerance and further assessment as indicated.          "

## 2019-05-10 ENCOUNTER — APPOINTMENT (OUTPATIENT)
Dept: RESPIRATORY THERAPY | Facility: HOSPITAL | Age: 64
End: 2019-05-10

## 2019-05-10 LAB
ALBUMIN SERPL-MCNC: 3.2 G/DL (ref 3.5–5.2)
ALBUMIN/GLOB SERPL: 0.8 G/DL
ALP SERPL-CCNC: 109 U/L (ref 39–117)
ALT SERPL W P-5'-P-CCNC: 15 U/L (ref 1–41)
ANION GAP SERPL CALCULATED.3IONS-SCNC: 13 MMOL/L
AORTIC DIMENSIONLESS INDEX: 0.4 (DI)
AST SERPL-CCNC: 18 U/L (ref 1–40)
BASOPHILS # BLD AUTO: 0.01 10*3/MM3 (ref 0–0.2)
BASOPHILS NFR BLD AUTO: 0.2 % (ref 0–1.5)
BH CV ECHO MEAS - AI DEC SLOPE: 564 CM/SEC^2
BH CV ECHO MEAS - AI MAX PG: 105.3 MMHG
BH CV ECHO MEAS - AI MAX VEL: 513 CM/SEC
BH CV ECHO MEAS - AI P1/2T: 266.4 MSEC
BH CV ECHO MEAS - AO MAX PG (FULL): 48.8 MMHG
BH CV ECHO MEAS - AO MAX PG: 58.1 MMHG
BH CV ECHO MEAS - AO MEAN PG (FULL): 24 MMHG
BH CV ECHO MEAS - AO MEAN PG: 30 MMHG
BH CV ECHO MEAS - AO ROOT AREA (BSA CORRECTED): 1.5
BH CV ECHO MEAS - AO ROOT AREA: 7.5 CM^2
BH CV ECHO MEAS - AO ROOT DIAM: 3.1 CM
BH CV ECHO MEAS - AO V2 MAX: 381 CM/SEC
BH CV ECHO MEAS - AO V2 MEAN: 254 CM/SEC
BH CV ECHO MEAS - AO V2 VTI: 67.3 CM
BH CV ECHO MEAS - ASC AORTA: 3.4 CM
BH CV ECHO MEAS - AVA(I,A): 1.2 CM^2
BH CV ECHO MEAS - AVA(I,D): 59 CM^2
BH CV ECHO MEAS - AVA(V,A): 1.1 CM^2
BH CV ECHO MEAS - AVA(V,D): 1.1 CM^2
BH CV ECHO MEAS - BSA(HAYCOCK): 2.2 M^2
BH CV ECHO MEAS - BSA: 2.1 M^2
BH CV ECHO MEAS - BZI_BMI: 32.1 KILOGRAMS/M^2
BH CV ECHO MEAS - BZI_METRIC_HEIGHT: 172 CM
BH CV ECHO MEAS - BZI_METRIC_WEIGHT: 95 KG
BH CV ECHO MEAS - EDV(CUBED): 125 ML
BH CV ECHO MEAS - EDV(MOD-SP2): 127 ML
BH CV ECHO MEAS - EDV(MOD-SP4): 130 ML
BH CV ECHO MEAS - EDV(TEICH): 118.2 ML
BH CV ECHO MEAS - EF(CUBED): 62.7 %
BH CV ECHO MEAS - EF(MOD-BP): 57 %
BH CV ECHO MEAS - EF(MOD-SP2): 51.2 %
BH CV ECHO MEAS - EF(MOD-SP4): 57.7 %
BH CV ECHO MEAS - EF(TEICH): 54 %
BH CV ECHO MEAS - ESV(CUBED): 46.7 ML
BH CV ECHO MEAS - ESV(MOD-SP2): 62 ML
BH CV ECHO MEAS - ESV(MOD-SP4): 55 ML
BH CV ECHO MEAS - ESV(TEICH): 54.4 ML
BH CV ECHO MEAS - FS: 28 %
BH CV ECHO MEAS - IVS/LVPW: 1.3
BH CV ECHO MEAS - IVSD: 1.5 CM
BH CV ECHO MEAS - LAT PEAK E' VEL: 10.1 CM/SEC
BH CV ECHO MEAS - LV DIASTOLIC VOL/BSA (35-75): 62.6 ML/M^2
BH CV ECHO MEAS - LV MASS(C)D: 276.4 GRAMS
BH CV ECHO MEAS - LV MASS(C)DI: 133 GRAMS/M^2
BH CV ECHO MEAS - LV MAX PG: 9.2 MMHG
BH CV ECHO MEAS - LV MEAN PG: 6 MMHG
BH CV ECHO MEAS - LV SYSTOLIC VOL/BSA (12-30): 26.5 ML/M^2
BH CV ECHO MEAS - LV V1 MAX: 152 CM/SEC
BH CV ECHO MEAS - LV V1 MEAN: 113 CM/SEC
BH CV ECHO MEAS - LV V1 VTI: 28 CM
BH CV ECHO MEAS - LVIDD: 5 CM
BH CV ECHO MEAS - LVIDS: 3.6 CM
BH CV ECHO MEAS - LVLD AP2: 9 CM
BH CV ECHO MEAS - LVLD AP4: 10.5 CM
BH CV ECHO MEAS - LVLS AP2: 9.3 CM
BH CV ECHO MEAS - LVLS AP4: 9.7 CM
BH CV ECHO MEAS - LVOT AREA (M): 2.8 CM^2
BH CV ECHO MEAS - LVOT AREA: 2.8 CM^2
BH CV ECHO MEAS - LVOT DIAM: 1.9 CM
BH CV ECHO MEAS - LVPWD: 1.2 CM
BH CV ECHO MEAS - MED PEAK E' VEL: 6.09 CM/SEC
BH CV ECHO MEAS - MV A DUR: 172 SEC
BH CV ECHO MEAS - MV A MAX VEL: 200 CM/SEC
BH CV ECHO MEAS - MV DEC SLOPE: 404 CM/SEC^2
BH CV ECHO MEAS - MV DEC TIME: 312 SEC
BH CV ECHO MEAS - MV E MAX VEL: 126 CM/SEC
BH CV ECHO MEAS - MV E/A: 0.63
BH CV ECHO MEAS - MV MAX PG: 13.4 MMHG
BH CV ECHO MEAS - MV MEAN PG: 5 MMHG
BH CV ECHO MEAS - MV V2 MAX: 183 CM/SEC
BH CV ECHO MEAS - MV V2 MEAN: 94.5 CM/SEC
BH CV ECHO MEAS - MV V2 VTI: 28.5 CM
BH CV ECHO MEAS - MVA(VTI): 2.8 CM^2
BH CV ECHO MEAS - PA ACC TIME: 0.09 SEC
BH CV ECHO MEAS - PA MAX PG (FULL): 5.4 MMHG
BH CV ECHO MEAS - PA MAX PG: 10.1 MMHG
BH CV ECHO MEAS - PA PR(ACCEL): 37.6 MMHG
BH CV ECHO MEAS - PA V2 MAX: 159 CM/SEC
BH CV ECHO MEAS - PULM A REVS DUR: 0.09 SEC
BH CV ECHO MEAS - PULM A REVS VEL: 37 CM/SEC
BH CV ECHO MEAS - PULM DIAS VEL: 52.8 CM/SEC
BH CV ECHO MEAS - PULM S/D: 1.3
BH CV ECHO MEAS - PULM SYS VEL: 66.8 CM/SEC
BH CV ECHO MEAS - PVA(V,A): 2.1 CM^2
BH CV ECHO MEAS - PVA(V,D): 2.1 CM^2
BH CV ECHO MEAS - QP/QS: 0.82
BH CV ECHO MEAS - RV MAX PG: 4.7 MMHG
BH CV ECHO MEAS - RV MEAN PG: 3 MMHG
BH CV ECHO MEAS - RV V1 MAX: 108 CM/SEC
BH CV ECHO MEAS - RV V1 MEAN: 74.2 CM/SEC
BH CV ECHO MEAS - RV V1 VTI: 20.8 CM
BH CV ECHO MEAS - RVOT AREA: 3.1 CM^2
BH CV ECHO MEAS - RVOT DIAM: 2 CM
BH CV ECHO MEAS - SI(AO): 244.4 ML/M^2
BH CV ECHO MEAS - SI(CUBED): 37.7 ML/M^2
BH CV ECHO MEAS - SI(LVOT): 38.2 ML/M^2
BH CV ECHO MEAS - SI(MOD-SP2): 31.3 ML/M^2
BH CV ECHO MEAS - SI(MOD-SP4): 36.1 ML/M^2
BH CV ECHO MEAS - SI(TEICH): 30.7 ML/M^2
BH CV ECHO MEAS - SV(AO): 508 ML
BH CV ECHO MEAS - SV(CUBED): 78.3 ML
BH CV ECHO MEAS - SV(LVOT): 79.4 ML
BH CV ECHO MEAS - SV(MOD-SP2): 65 ML
BH CV ECHO MEAS - SV(MOD-SP4): 75 ML
BH CV ECHO MEAS - SV(RVOT): 65.3 ML
BH CV ECHO MEAS - SV(TEICH): 63.8 ML
BH CV ECHO MEAS - TAPSE (>1.6): 3.2 CM2
BH CV ECHO MEASUREMENTS AVERAGE E/E' RATIO: 15.57
BH CV XLRA - RV BASE: 3.35 CM
BH CV XLRA - TDI S': 21.4 CM/SEC
BILIRUB SERPL-MCNC: 0.2 MG/DL (ref 0.2–1.2)
BUN BLD-MCNC: 27 MG/DL (ref 8–23)
BUN/CREAT SERPL: 19.3 (ref 7–25)
CALCIUM SPEC-SCNC: 9.3 MG/DL (ref 8.6–10.5)
CHLORIDE SERPL-SCNC: 96 MMOL/L (ref 98–107)
CHOLEST SERPL-MCNC: 117 MG/DL (ref 0–200)
CO2 SERPL-SCNC: 23 MMOL/L (ref 22–29)
CREAT BLD-MCNC: 1.4 MG/DL (ref 0.76–1.27)
D-LACTATE SERPL-SCNC: 0.5 MMOL/L (ref 0.5–2)
DEPRECATED RDW RBC AUTO: 41.8 FL (ref 37–54)
EOSINOPHIL # BLD AUTO: 0.03 10*3/MM3 (ref 0–0.4)
EOSINOPHIL NFR BLD AUTO: 0.5 % (ref 0.3–6.2)
ERYTHROCYTE [DISTWIDTH] IN BLOOD BY AUTOMATED COUNT: 13.2 % (ref 12.3–15.4)
GFR SERPL CREATININE-BSD FRML MDRD: 51 ML/MIN/1.73
GLOBULIN UR ELPH-MCNC: 4 GM/DL
GLUCOSE BLD-MCNC: 119 MG/DL (ref 65–99)
HBA1C MFR BLD: 5.5 % (ref 4.8–5.6)
HCT VFR BLD AUTO: 29.8 % (ref 37.5–51)
HDLC SERPL-MCNC: 56 MG/DL (ref 40–60)
HGB BLD-MCNC: 9.7 G/DL (ref 13–17.7)
HYPOCHROMIA BLD QL: NORMAL
LDLC SERPL CALC-MCNC: 37 MG/DL (ref 0–100)
LDLC/HDLC SERPL: 0.66 {RATIO}
LEFT ATRIUM VOLUME INDEX: 27 ML/M2
LV EF 2D ECHO EST: 65 %
LYMPHOCYTES # BLD AUTO: 0.8 10*3/MM3 (ref 0.7–3.1)
LYMPHOCYTES NFR BLD AUTO: 13.1 % (ref 19.6–45.3)
MAXIMAL PREDICTED HEART RATE: 157 BPM
MCH RBC QN AUTO: 28.4 PG (ref 26.6–33)
MCHC RBC AUTO-ENTMCNC: 32.6 G/DL (ref 31.5–35.7)
MCV RBC AUTO: 87.4 FL (ref 79–97)
MONOCYTES # BLD AUTO: 1.39 10*3/MM3 (ref 0.1–0.9)
MONOCYTES NFR BLD AUTO: 22.7 % (ref 5–12)
NEUTROPHILS # BLD AUTO: 3.87 10*3/MM3 (ref 1.7–7)
NEUTROPHILS NFR BLD AUTO: 63.2 % (ref 42.7–76)
NT-PROBNP SERPL-MCNC: 944.9 PG/ML (ref 5–900)
PLAT MORPH BLD: NORMAL
PLATELET # BLD AUTO: 410 10*3/MM3 (ref 140–450)
PMV BLD AUTO: 9.1 FL (ref 6–12)
POTASSIUM BLD-SCNC: 4.5 MMOL/L (ref 3.5–5.2)
PROCALCITONIN SERPL-MCNC: 0.2 NG/ML (ref 0.1–0.25)
PROT SERPL-MCNC: 7.2 G/DL (ref 6–8.5)
RBC # BLD AUTO: 3.41 10*6/MM3 (ref 4.14–5.8)
SODIUM BLD-SCNC: 132 MMOL/L (ref 136–145)
STRESS TARGET HR: 133 BPM
TRIGL SERPL-MCNC: 121 MG/DL (ref 0–150)
TSH SERPL DL<=0.05 MIU/L-ACNC: 2.69 MIU/ML (ref 0.27–4.2)
VLDLC SERPL-MCNC: 24.2 MG/DL (ref 5–40)
WBC MORPH BLD: NORMAL
WBC NRBC COR # BLD: 6.12 10*3/MM3 (ref 3.4–10.8)

## 2019-05-10 PROCEDURE — 85025 COMPLETE CBC W/AUTO DIFF WBC: CPT | Performed by: HOSPITALIST

## 2019-05-10 PROCEDURE — 94799 UNLISTED PULMONARY SVC/PX: CPT

## 2019-05-10 PROCEDURE — 94729 DIFFUSING CAPACITY: CPT

## 2019-05-10 PROCEDURE — 63710000001 SIROLIMUS 0.5 MG TABLET: Performed by: HOSPITALIST

## 2019-05-10 PROCEDURE — 94060 EVALUATION OF WHEEZING: CPT

## 2019-05-10 PROCEDURE — 25010000002 HYDROMORPHONE PER 4 MG: Performed by: HOSPITALIST

## 2019-05-10 PROCEDURE — 80053 COMPREHEN METABOLIC PANEL: CPT | Performed by: HOSPITALIST

## 2019-05-10 PROCEDURE — 84443 ASSAY THYROID STIM HORMONE: CPT | Performed by: HOSPITALIST

## 2019-05-10 PROCEDURE — 83605 ASSAY OF LACTIC ACID: CPT | Performed by: HOSPITALIST

## 2019-05-10 PROCEDURE — 83036 HEMOGLOBIN GLYCOSYLATED A1C: CPT | Performed by: HOSPITALIST

## 2019-05-10 PROCEDURE — 83880 ASSAY OF NATRIURETIC PEPTIDE: CPT | Performed by: HOSPITALIST

## 2019-05-10 PROCEDURE — 85007 BL SMEAR W/DIFF WBC COUNT: CPT | Performed by: HOSPITALIST

## 2019-05-10 PROCEDURE — 80061 LIPID PANEL: CPT | Performed by: HOSPITALIST

## 2019-05-10 PROCEDURE — 25010000002 ENOXAPARIN PER 10 MG: Performed by: HOSPITALIST

## 2019-05-10 PROCEDURE — 94726 PLETHYSMOGRAPHY LUNG VOLUMES: CPT

## 2019-05-10 PROCEDURE — 84145 PROCALCITONIN (PCT): CPT | Performed by: HOSPITALIST

## 2019-05-10 PROCEDURE — 25010000002 FUROSEMIDE PER 20 MG: Performed by: HOSPITALIST

## 2019-05-10 RX ORDER — ECHINACEA PURPUREA EXTRACT 125 MG
2 TABLET ORAL AS NEEDED
Status: DISCONTINUED | OUTPATIENT
Start: 2019-05-10 | End: 2019-05-13

## 2019-05-10 RX ORDER — OXYMETAZOLINE HYDROCHLORIDE 0.05 G/100ML
2 SPRAY NASAL 2 TIMES DAILY
Status: DISCONTINUED | OUTPATIENT
Start: 2019-05-10 | End: 2019-05-11

## 2019-05-10 RX ORDER — ALBUTEROL SULFATE 2.5 MG/3ML
2.5 SOLUTION RESPIRATORY (INHALATION) ONCE
Status: COMPLETED | OUTPATIENT
Start: 2019-05-10 | End: 2019-05-10

## 2019-05-10 RX ORDER — FUROSEMIDE 10 MG/ML
20 INJECTION INTRAMUSCULAR; INTRAVENOUS EVERY 12 HOURS
Status: DISCONTINUED | OUTPATIENT
Start: 2019-05-10 | End: 2019-05-11

## 2019-05-10 RX ADMIN — DILTIAZEM HYDROCHLORIDE 120 MG: 120 CAPSULE, COATED, EXTENDED RELEASE ORAL at 08:26

## 2019-05-10 RX ADMIN — PANTOPRAZOLE SODIUM 40 MG: 40 TABLET, DELAYED RELEASE ORAL at 06:33

## 2019-05-10 RX ADMIN — PHENOL 2 SPRAY: 1.5 LIQUID ORAL at 14:46

## 2019-05-10 RX ADMIN — GUAIFENESIN 600 MG: 600 TABLET, EXTENDED RELEASE ORAL at 20:55

## 2019-05-10 RX ADMIN — FUROSEMIDE 20 MG: 10 INJECTION, SOLUTION INTRAMUSCULAR; INTRAVENOUS at 18:20

## 2019-05-10 RX ADMIN — MONTELUKAST SODIUM 10 MG: 10 TABLET, FILM COATED ORAL at 20:55

## 2019-05-10 RX ADMIN — ATORVASTATIN CALCIUM 10 MG: 10 TABLET, FILM COATED ORAL at 20:55

## 2019-05-10 RX ADMIN — PANTOPRAZOLE SODIUM 40 MG: 40 TABLET, DELAYED RELEASE ORAL at 16:36

## 2019-05-10 RX ADMIN — SPIRONOLACTONE 100 MG: 100 TABLET ORAL at 08:26

## 2019-05-10 RX ADMIN — FUROSEMIDE 40 MG: 10 INJECTION, SOLUTION INTRAMUSCULAR; INTRAVENOUS at 06:33

## 2019-05-10 RX ADMIN — Medication 2 SPRAY: at 20:55

## 2019-05-10 RX ADMIN — LEVOTHYROXINE SODIUM 25 MCG: 25 TABLET ORAL at 06:33

## 2019-05-10 RX ADMIN — SIROLIMUS 3 MG: 0.5 TABLET, SUGAR COATED ORAL at 08:26

## 2019-05-10 RX ADMIN — HYDROMORPHONE HYDROCHLORIDE 0.5 MG: 1 INJECTION, SOLUTION INTRAMUSCULAR; INTRAVENOUS; SUBCUTANEOUS at 18:38

## 2019-05-10 RX ADMIN — ASPIRIN 81 MG: 81 TABLET, CHEWABLE ORAL at 08:30

## 2019-05-10 RX ADMIN — ENOXAPARIN SODIUM 40 MG: 40 INJECTION SUBCUTANEOUS at 14:43

## 2019-05-10 RX ADMIN — HYDRALAZINE HYDROCHLORIDE 75 MG: 50 TABLET, FILM COATED ORAL at 20:55

## 2019-05-10 RX ADMIN — HYDROMORPHONE HYDROCHLORIDE 0.5 MG: 1 INJECTION, SOLUTION INTRAMUSCULAR; INTRAVENOUS; SUBCUTANEOUS at 10:19

## 2019-05-10 RX ADMIN — ALBUTEROL SULFATE 2.5 MG: 2.5 SOLUTION RESPIRATORY (INHALATION) at 15:22

## 2019-05-10 RX ADMIN — HYDROMORPHONE HYDROCHLORIDE 0.5 MG: 1 INJECTION, SOLUTION INTRAMUSCULAR; INTRAVENOUS; SUBCUTANEOUS at 14:43

## 2019-05-10 RX ADMIN — HYDRALAZINE HYDROCHLORIDE 75 MG: 50 TABLET, FILM COATED ORAL at 08:26

## 2019-05-10 RX ADMIN — SODIUM CHLORIDE, PRESERVATIVE FREE 10 ML: 5 INJECTION INTRAVENOUS at 20:55

## 2019-05-10 RX ADMIN — GUAIFENESIN 600 MG: 600 TABLET, EXTENDED RELEASE ORAL at 08:26

## 2019-05-10 RX ADMIN — BUDESONIDE AND FORMOTEROL FUMARATE DIHYDRATE 2 PUFF: 160; 4.5 AEROSOL RESPIRATORY (INHALATION) at 08:15

## 2019-05-10 RX ADMIN — Medication 2 SPRAY: at 14:44

## 2019-05-10 RX ADMIN — HYDROMORPHONE HYDROCHLORIDE 0.5 MG: 1 INJECTION, SOLUTION INTRAMUSCULAR; INTRAVENOUS; SUBCUTANEOUS at 00:46

## 2019-05-10 RX ADMIN — HYDRALAZINE HYDROCHLORIDE 75 MG: 50 TABLET, FILM COATED ORAL at 16:36

## 2019-05-10 RX ADMIN — HYDROMORPHONE HYDROCHLORIDE 0.5 MG: 1 INJECTION, SOLUTION INTRAMUSCULAR; INTRAVENOUS; SUBCUTANEOUS at 06:33

## 2019-05-10 RX ADMIN — LEVOFLOXACIN 750 MG: 750 TABLET, FILM COATED ORAL at 14:44

## 2019-05-10 NOTE — NURSING NOTE
Spoke with Silvina in Dr. Pierce's office via phone.  Patient has appointment with Dr. Pierce at 1300 5/10/19 in [address].

## 2019-05-10 NOTE — PLAN OF CARE
Problem: Patient Care Overview  Goal: Plan of Care Review  Outcome: Ongoing (interventions implemented as appropriate)   05/10/19 0419   Coping/Psychosocial   Plan of Care Reviewed With patient   Plan of Care Review   Progress improving   OTHER   Outcome Summary pt is alert and oriented, room air, complaining of throat pain, medicated PRN, no falls, continue to monitor     Goal: Individualization and Mutuality  Outcome: Ongoing (interventions implemented as appropriate)    Goal: Discharge Needs Assessment  Outcome: Ongoing (interventions implemented as appropriate)      Problem: Cardiac: Heart Failure (Adult)  Goal: Signs and Symptoms of Listed Potential Problems Will be Absent, Minimized or Managed (Cardiac: Heart Failure)  Outcome: Ongoing (interventions implemented as appropriate)      Problem: Pain, Acute (Adult)  Goal: Acceptable Pain Control/Comfort Level  Outcome: Ongoing (interventions implemented as appropriate)

## 2019-05-10 NOTE — PROGRESS NOTES
[facility]     Mar/chart reviewed  F/u lung nodules, cough  Says he has been having a lot of shortness of breath for months prior to admission  Says cough is a little better today  Says throat still pretty sore    Vital Sign Min/Max for last 24 hours  Temp  Min: 97.7 °F (36.5 °C)  Max: 99.2 °F (37.3 °C)   BP  Min: 145/65  Max: 183/88   Pulse  Min: 90  Max: 106   Resp  Min: 16  Max: 16   SpO2  Min: 92 %  Max: 95 %   No Data Recorded   Weight  Min: 94.8 kg (209 lb 1.6 oz)  Max: 95 kg (209 lb 7 oz)     Nad, axox3,   perrl, eomi, no icterus,  mmm, no jvd, trachea midline, neck supple,  chest cta bilaterally, no crackles, no wheezes,   rrr,   soft, nt, nd +bs,  no c/c/ e    5/10: labs; reviewed  Bun 27  Cr 1.4  Na 132  Wbc 6  hgb 9.7    /P:  1. Chronic cough -- multiple possible etiologies.  He really sounds very clear right now, does not appear to be wheezing.  I would agree with symbicort.  He does  Seem to have a good deal of nasal congestion, possibly this is upper airway cough.  GERD might be playing a role as well and I would agree with ppi.    2. Asthma -- does not appear acutely exacerbated, will check a viral respiratory panel  3. Lung nodules -- repeat ct in 3 months given the 8mm nodule per fleischner 2017 society guidelines  4. Anemia  5. Hyponatremia  6. Aortic stenosis  7. gerd    Will check serum ige, allergen profile and pft's.   Repeat ct chest in 3 months time follow up after that. Ok with me to d/c anytime.

## 2019-05-10 NOTE — PLAN OF CARE
Problem: Patient Care Overview  Goal: Plan of Care Review  Outcome: Ongoing (interventions implemented as appropriate)   05/10/19 5994   Coping/Psychosocial   Plan of Care Reviewed With patient   Plan of Care Review   Progress improving   OTHER   Outcome Summary Pt has complained of severe throat pain. Medicated with dilaudid q4h. Has throat spray and nasal sparys prn, states little relief. Up ad carlin. PFT test this afternoon. ENT janna this afternoon. VSS. Will continue to monitor.     Goal: Individualization and Mutuality  Outcome: Ongoing (interventions implemented as appropriate)    Goal: Discharge Needs Assessment  Outcome: Ongoing (interventions implemented as appropriate)      Problem: Cardiac: Heart Failure (Adult)  Goal: Signs and Symptoms of Listed Potential Problems Will be Absent, Minimized or Managed (Cardiac: Heart Failure)  Outcome: Ongoing (interventions implemented as appropriate)      Problem: Pain, Acute (Adult)  Goal: Identify Related Risk Factors and Signs and Symptoms  Outcome: Ongoing (interventions implemented as appropriate)    Goal: Acceptable Pain Control/Comfort Level  Outcome: Ongoing (interventions implemented as appropriate)      Problem: Skin Injury Risk (Adult)  Goal: Identify Related Risk Factors and Signs and Symptoms  Outcome: Ongoing (interventions implemented as appropriate)    Goal: Skin Health and Integrity  Outcome: Ongoing (interventions implemented as appropriate)

## 2019-05-10 NOTE — SIGNIFICANT NOTE
05/10/19 1452   Rehab Time/Intention   Evaluation Not Performed patient/family declined evaluation  (pt reports up on own, took a shower without assist today (RN confirms).  Pt denies UE/balance/sensation/ADL difficulties.  Full OT eval not warranted.  Discuss with RN. )   Rehab Treatment   Discipline occupational therapist

## 2019-05-10 NOTE — PROGRESS NOTES
"Daily progress note    Chief complaint  Doing same  No new complaint    History of present illness  63-year-old white male with history of asthma aortic stenosis gastroesophageal reflux disease hepatitis C hypothyroidism chronic atrial fibrillation low back pain hypertension and COPD presented to [facility], [place], operated by [facility] emergency with shortness of breath for last several months which is getting worse also have nonproductive cough sore throat with difficulty swallowing and congestion.  Patient denies any chest pain fever chills abdominal pain nausea vomiting diarrhea.  Patient evaluated in ER found to have decompensated CHF and acute sinusitis admit for management.     REVIEW OF SYSTEMS  Review of Systems   Constitutional: Negative for activity change, appetite change and fever.   HENT: Positive for congestion, sore throat and trouble swallowing.    Eyes: Negative.    Respiratory: Positive for shortness of breath. Negative for cough.    Cardiovascular: Negative for chest pain and leg swelling.   Gastrointestinal: Negative for abdominal pain, diarrhea and vomiting.   Endocrine: Negative.    Genitourinary: Negative for decreased urine volume and dysuria.   Musculoskeletal: Negative for neck pain.   Skin: Negative for rash and wound.   Allergic/Immunologic: Negative.    Neurological: Negative for weakness, numbness and headaches.   Hematological: Negative.    Psychiatric/Behavioral: Negative.    All other systems reviewed and are negative.     PHYSICAL EXAM  Blood pressure 154/86, pulse 92, temperature 98.3 °F (36.8 °C), temperature source Oral, resp. rate 16, height 172 cm (67.72\"), weight 94.8 kg (209 lb 1.6 oz), SpO2 95 %.    Constitutional: He is oriented to person, place, and time. No distress.   Head: Normocephalic and atraumatic.   Eyes: EOM are normal. Pupils are equal, round, and reactive to light.   Neck: Normal range of motion. Neck supple.   Cardiovascular: Regular rhythm. Tachycardia present.   Murmur " heard.  Pulmonary/Chest: Effort normal and breath sounds normal. No respiratory distress.   Abdominal: Soft. There is no tenderness. There is no rebound and no guarding.   Musculoskeletal: Normal range of motion. He exhibits edema (2+ BLE).   Neurological: He is alert and oriented to person, place, and time. He has normal sensation and normal strength.   Skin: Skin is warm and dry.   Psychiatric: Mood and affect normal.     LAB RESULTS  Lab Results (last 24 hours)     Procedure Component Value Units Date/Time    CBC & Differential [[id number]] Collected:  05/10/19 0451    Specimen:  Blood Updated:  05/10/19 0827    Narrative:       The following orders were created for panel order CBC & Differential.  Procedure                               Abnormality         Status                     ---------                               -----------         ------                     Scan Slide[[id number]]                                       Final result               CBC Auto Differential[[id number]]        Abnormal            Final result                 Please view results for these tests on the individual orders.    Scan Slide [[id number]] Collected:  05/10/19 0451    Specimen:  Blood Updated:  05/10/19 0827     Hypochromia Mod/2+     WBC Morphology Normal     Platelet Morphology Normal    CBC Auto Differential [[id number]]  (Abnormal) Collected:  05/10/19 0451    Specimen:  Blood Updated:  05/10/19 0827     WBC 6.12 10*3/mm3      RBC 3.41 10*6/mm3      Hemoglobin 9.7 g/dL      Hematocrit 29.8 %      MCV 87.4 fL      MCH 28.4 pg      MCHC 32.6 g/dL      RDW 13.2 %      RDW-SD 41.8 fl      MPV 9.1 fL      Platelets 410 10*3/mm3      Neutrophil % 63.2 %      Lymphocyte % 13.1 %      Monocyte % 22.7 %      Eosinophil % 0.5 %      Basophil % 0.2 %      Neutrophils, Absolute 3.87 10*3/mm3      Lymphocytes, Absolute 0.80 10*3/mm3      Monocytes, Absolute 1.39 10*3/mm3      Eosinophils, Absolute 0.03 10*3/mm3      Basophils, Absolute  "0.01 10*3/mm3     TSH [[id number]]  (Normal) Collected:  05/10/19 0451    Specimen:  Blood Updated:  05/10/19 0720     TSH 2.690 mIU/mL     BNP [[id number]]  (Abnormal) Collected:  05/10/19 0451    Specimen:  Blood Updated:  05/10/19 0720     proBNP 944.9 pg/mL     Narrative:       Among patients with dyspnea, NT-proBNP is highly sensitive for the detection of acute congestive heart failure. In addition NT-proBNP of <300 pg/ml effectively rules out acute congestive heart failure with 99% negative predictive value.    Procalcitonin [[id number]]  (Normal) Collected:  05/10/19 0451    Specimen:  Blood Updated:  05/10/19 0720     Procalcitonin 0.20 ng/mL     Narrative:       As a Marker for Sepsis (Non-Neonates):   1. <0.5 ng/mL represents a low risk of severe sepsis and/or septic shock.  1. >2 ng/mL represents a high risk of severe sepsis and/or septic shock.    As a Marker for Lower Respiratory Tract Infections that require antibiotic therapy:  PCT on Admission     Antibiotic Therapy             6-12 Hrs later  > 0.5                Strongly Recommended            >0.25 - <0.5         Recommended  0.1 - 0.25           Discouraged                   Remeasure/reassess PCT  <0.1                 Strongly Discouraged          Remeasure/reassess PCT      As 28 day mortality risk marker: \"Change in Procalcitonin Result\" (> 80 % or <=80 %) if Day 0 (or Day 1) and Day 4 values are available. Refer to http://www.[facility]-pct-calculator.com/   Change in PCT <=80 %   A decrease of PCT levels below or equal to 80 % defines a positive change in PCT test result representing a higher risk for 28-day all-cause mortality of patients diagnosed with severe sepsis or septic shock.  Change in PCT > 80 %   A decrease of PCT levels of more than 80 % defines a negative change in PCT result representing a lower risk for 28-day all-cause mortality of patients diagnosed with severe sepsis or septic shock.                Comprehensive Metabolic Panel " [[id number]]  (Abnormal) Collected:  05/10/19 0451    Specimen:  Blood Updated:  05/10/19 0714     Glucose 119 mg/dL      BUN 27 mg/dL      Creatinine 1.40 mg/dL      Sodium 132 mmol/L      Potassium 4.5 mmol/L      Chloride 96 mmol/L      CO2 23.0 mmol/L      Calcium 9.3 mg/dL      Total Protein 7.2 g/dL      Albumin 3.20 g/dL      ALT (SGPT) 15 U/L      AST (SGOT) 18 U/L      Alkaline Phosphatase 109 U/L      Total Bilirubin 0.2 mg/dL      eGFR Non African Amer 51 mL/min/1.73      Globulin 4.0 gm/dL      A/G Ratio 0.8 g/dL      BUN/Creatinine Ratio 19.3     Anion Gap 13.0 mmol/L     Narrative:       GFR Normal >60  Chronic Kidney Disease <60  Kidney Failure <15    Lipid Panel [[id number]] Collected:  05/10/19 0451    Specimen:  Blood Updated:  05/10/19 0714     Total Cholesterol 117 mg/dL      Triglycerides 121 mg/dL      HDL Cholesterol 56 mg/dL      LDL Cholesterol  37 mg/dL      VLDL Cholesterol 24.2 mg/dL      LDL/HDL Ratio 0.66    Narrative:       Cholesterol Reference Ranges  (U.S. Department of Health and Human Services ATP III Classifications)    Desirable          <200 mg/dL  Borderline High    200-239 mg/dL  High Risk          >240 mg/dL      Triglyceride Reference Ranges  (U.S. Department of Health and Human Services ATP III Classifications)    Normal           <150 mg/dL  Borderline High  150-199 mg/dL  High             200-499 mg/dL  Very High        >500 mg/dL    HDL Reference Ranges  (U.S. Department of Health and Human Services ATP III Classifcations)    Low     <40 mg/dl (major risk factor for CHD)  High    >60 mg/dl ('negative' risk factor for CHD)        LDL Reference Ranges  (U.S. Department of Health and Human Services ATP III Classifcations)    Optimal          <100 mg/dL  Near Optimal     100-129 mg/dL  Borderline High  130-159 mg/dL  High             160-189 mg/dL  Very High        >189 mg/dL    Hemoglobin A1c [[id number]]  (Normal) Collected:  05/10/19 0451    Specimen:  Blood Updated:   05/10/19 0647     Hemoglobin A1C 5.50 %     Narrative:       Hemoglobin A1C Ranges:    Increased Risk for Diabetes  5.7% to 6.4%  Diabetes                     >= 6.5%  Diabetic Goal                < 7.0%    Lactic Acid, Plasma [[id number]]  (Normal) Collected:  05/10/19 0453    Specimen:  Blood Updated:  05/10/19 0643     Lactate 0.5 mmol/L     Respiratory Panel, PCR - Swab, Nasopharynx [[id number]]  (Normal) Collected:  05/09/19 1655    Specimen:  Swab from Nasopharynx Updated:  05/09/19 2005     ADENOVIRUS, PCR Not Detected     Coronavirus 229E Not Detected     Coronavirus HKU1 Not Detected     Coronavirus NL63 Not Detected     Coronavirus OC43 Not Detected     Human Metapneumovirus Not Detected     Human Rhinovirus/Enterovirus Not Detected     Influenza B PCR Not Detected     Parainfluenza Virus 1 Not Detected     Parainfluenza Virus 2 Not Detected     Parainfluenza Virus 3 Not Detected     Parainfluenza Virus 4 Not Detected     Bordetella pertussis pcr Not Detected     Influenza A H1 2009 PCR Not Detected     Chlamydophila pneumoniae PCR Not Detected     Mycoplasma pneumo by PCR Not Detected     Influenza A PCR Not Detected     Influenza A H3 Not Detected     Influenza A H1 Not Detected     RSV, PCR Not Detected     Bordetella parapertussis PCR Not Detected        Imaging Results (last 24 hours)     ** No results found for the last 24 hours. **        ECG 12 Lead   Order: [id number]   Status:  Final result   Visible to patient:  No (Not Released) Next appt:  None      Narrative     HEART RATE= 104  bpm  RR Interval= 580  ms  NV Interval= 165  ms  P Horizontal Axis= 9  deg  P Front Axis= 55  deg  QRSD Interval= 93  ms  QT Interval= 332  ms  QRS Axis= 10  deg  T Wave Axis= 72  deg  - ABNORMAL ECG -  Sinus tachycardia  POOR R WAVE PROGRESSION  NO SIGNIFICANT CHANGE FROM PREVIOUS ECG             Current Facility-Administered Medications:   •  albuterol sulfate HFA (PROVENTIL HFA;VENTOLIN HFA;PROAIR HFA) inhaler 2 puff,  2 puff, Inhalation, Q4H PRN, Kartik Arriaga MD, 2 puff at 05/09/19 0652  •  aspirin chewable tablet 81 mg, 81 mg, Oral, Daily, Kartik Arriaga MD, 81 mg at 05/10/19 0830  •  atorvastatin (LIPITOR) tablet 10 mg, 10 mg, Oral, Nightly, Kartik Arriaga MD, 10 mg at 05/09/19 1938  •  budesonide-formoterol (SYMBICORT) 160-4.5 MCG/ACT inhaler 2 puff, 2 puff, Inhalation, BID - RT, Kartik Arriaga MD, 2 puff at 05/10/19 0815  •  diltiaZEM CD (CARDIZEM CD) 24 hr capsule 120 mg, 120 mg, Oral, Q24H, Kartik Arriaga MD, 120 mg at 05/10/19 0826  •  enoxaparin (LOVENOX) syringe 40 mg, 40 mg, Subcutaneous, Q24H, Kartik Arriaga MD, 40 mg at 05/09/19 1444  •  furosemide (LASIX) injection 40 mg, 40 mg, Intravenous, Q12H, Kartik Arriaga MD, 40 mg at 05/10/19 0633  •  guaiFENesin (MUCINEX) 12 hr tablet 600 mg, 600 mg, Oral, Q12H, Kartik Arriaga MD, 600 mg at 05/10/19 0826  •  hydrALAZINE (APRESOLINE) tablet 75 mg, 75 mg, Oral, TID, Kartik Arriaga MD, 75 mg at 05/10/19 0826  •  HYDROcod Polst-CPM Polst ER (TUSSIONEX PENNKINETIC) 10-8 MG/5ML ER suspension 5 mL, 5 mL, Oral, BID PRN, Kartik Arriaga MD, 5 mL at 05/09/19 1131  •  HYDROmorphone (DILAUDID) injection 0.5 mg, 0.5 mg, Intravenous, Q4H PRN, Kartik Arriaga MD, 0.5 mg at 05/10/19 1019  •  ipratropium-albuterol (DUO-NEB) nebulizer solution 3 mL, 3 mL, Nebulization, Q4H PRN, Kartik Arriaga MD  •  levoFLOXacin (LEVAQUIN) tablet 750 mg, 750 mg, Oral, Q24H, Kartik Arriaga MD, 750 mg at 05/09/19 1444  •  levothyroxine (SYNTHROID, LEVOTHROID) tablet 25 mcg, 25 mcg, Oral, Q AM, Kartik Arriaga MD, 25 mcg at 05/10/19 0633  •  montelukast (SINGULAIR) tablet 10 mg, 10 mg, Oral, Nightly, Kartik Arriaga MD, 10 mg at 05/09/19 1938  •  ondansetron (ZOFRAN) injection 4 mg, 4 mg, Intravenous, Q6H PRN, Kartik Arriaga MD  •  oxymetazoline (AFRIN) nasal spray 2 spray, 2 spray, Each Nare, BID, Sebas Garcia MD  •  pantoprazole (PROTONIX) EC tablet 40 mg, 40 mg, Oral, BID AC, Kartik Arriaga MD, 40 mg at 05/10/19 0633  •   phenol (CHLORASEPTIC) 1.4 % liquid 2 spray, 2 spray, Mouth/Throat, Q2H PRN, Wolfgang Alicia MD  •  sirolimus (RAPAMUNE) tablet 3 mg, 3 mg, Oral, Daily, Wolfgang Alicia MD, 3 mg at 05/10/19 0826  •  sodium chloride (OCEAN) nasal spray 2 spray, 2 spray, Each Nare, PRN, Sebas Garcia MD  •  [COMPLETED] Insert peripheral IV, , , Once **AND** sodium chloride 0.9 % flush 10 mL, 10 mL, Intravenous, PRN, Mike Garnett MD, 10 mL at 05/09/19 0804  •  spironolactone (ALDACTONE) tablet 100 mg, 100 mg, Oral, Daily, Wolfgang Alicia MD, 100 mg at 05/10/19 0826     ASSESSMENT  Acute on chronic diastolic CHF  COPD/asthma  Lung nodules  Aortic stenosis  Hypertension  Hyperlipidemia  Hypothyroidism  Gastroesophageal reflux disease    PLAN  CPM  Supplemental oxygen nebulizer  IV diuresis  Cardiology and pulmonary input appreciated  ENT to see patient this afternoon  Empiric antibiotics for sinusitis  Continue home medications  Stress ulcer DVT prophylaxis  Supportive care  Symptomatic treatment for cough congestion  PT/OT  Follow closely further recommendation according to hospital course    WOLFGANG ALICIA MD

## 2019-05-10 NOTE — CONSULTS
Milton Figueroa   63 y.o.  male    LOS: 2 days   Patient Care Team:  Ankit Mcmahon MD as PCP - General (Family Medicine)  Shari Cuellar MD as Consulting Physician (Cardiology)      Subjective     Patient Complaints: Shortness of air, cough and severe sore throat    History of Present Illness: Patient reports that since January of this year he has had problems with the above symptoms of shortness of air cough productive of some sputum and a severe sore throat sore throat.  He feels that his tongue is swollen and he has swelling in his neck and has trouble swallowing.  His primary care physician about 4 5 times since the onset of the symptoms.  Also has some nasal congestion and sinus congestion and drainage.  Has a history of asthma/COPD.  Cardiology wise we were asked centimeters for because of the suggestion of acute heart failure.  His BNP was elevated at admission.  The initial CT of his chest did not demonstrate definite atrial pulmonary emboli although the peripheral possibility of pulmonary emboli could not be evaluated well.  On the chest x-ray and the CT there was noted clear suggestion of significant heart failure.  He has been on furosemide 40 mg IV twice a day.  He he had some swelling in his legs before he came in and that has resolved but he is not sure that the shortness of air he is been experiencing has improved much with the diuretic.  He continues to have the upper airway symptoms.  Cardiac wise he has a history of aortic stenosis.  An echocardiogram in May 2018 suggested mild to moderate aortic stenosis.  He had a 2-minute episode of atrial fibrillation in February 2017 on an event monitor but no documented recurrence since then.  He has not been anticoagulated.  He has a history of carotid artery disease as indicated in the assessment below.  History of hypertension and hyperlipidemia.  He has a history of hepatitis C and a liver transplant in 2010 and is on immunosuppressive therapy  He says  his activities been limited by shortness of air and sometimes when he is significantly short of air air he will feel little pressure in his chest.  He denies any severe lightheadedness presyncope or syncope.    Review of Systems:   Constitutional: Negative for activity change, appetite change and fever.   HENT: Positive for congestion, sore throat and trouble swallowing.    Eyes: Negative.    Respiratory: Positive for shortness of breath. Negative for cough.    Cardiovascular: Negative for chest pain and leg swelling.   Gastrointestinal: Negative for abdominal pain, diarrhea and vomiting.   Endocrine: Negative.    Genitourinary: Negative for decreased urine volume and dysuria.   Musculoskeletal: Negative for neck pain.   Skin: Negative for rash and wound.   Allergic/Immunologic: Negative.    Neurological: Negative for weakness, numbness and headaches.   Hematological: Negative.    Psychiatric/Behavioral: Negative.    All other systems reviewed and are negative.        Medication Review:   Current Facility-Administered Medications:   •  albuterol sulfate HFA (PROVENTIL HFA;VENTOLIN HFA;PROAIR HFA) inhaler 2 puff, 2 puff, Inhalation, Q4H PRN, Kartik Arriaga MD, 2 puff at 05/09/19 0652  •  aspirin chewable tablet 81 mg, 81 mg, Oral, Daily, Kartik Arriaga MD, 81 mg at 05/10/19 0830  •  atorvastatin (LIPITOR) tablet 10 mg, 10 mg, Oral, Nightly, Kartik Arriaga MD, 10 mg at 05/09/19 1938  •  budesonide-formoterol (SYMBICORT) 160-4.5 MCG/ACT inhaler 2 puff, 2 puff, Inhalation, BID - RT, Kartik Arriaga MD, 2 puff at 05/10/19 0815  •  diltiaZEM CD (CARDIZEM CD) 24 hr capsule 120 mg, 120 mg, Oral, Q24H, Kartik Arriaga MD, 120 mg at 05/10/19 0826  •  enoxaparin (LOVENOX) syringe 40 mg, 40 mg, Subcutaneous, Q24H, Kartik Arriaga MD, 40 mg at 05/09/19 1444  •  furosemide (LASIX) injection 40 mg, 40 mg, Intravenous, Q12H, Kartik Arriaga MD, 40 mg at 05/10/19 0633  •  guaiFENesin (MUCINEX) 12 hr tablet 600 mg, 600 mg, Oral, Q12H, Ahmed,  MD Kartik, 600 mg at 05/10/19 0826  •  hydrALAZINE (APRESOLINE) tablet 75 mg, 75 mg, Oral, TID, Kartik Arriaga MD, 75 mg at 05/10/19 0826  •  HYDROcod Polst-CPM Polst ER (TUSSIONEX PENNKINETIC) 10-8 MG/5ML ER suspension 5 mL, 5 mL, Oral, BID PRN, Kartik Arriaga MD, 5 mL at 05/09/19 1131  •  HYDROmorphone (DILAUDID) injection 0.5 mg, 0.5 mg, Intravenous, Q4H PRN, Kartik Arriaga MD, 0.5 mg at 05/10/19 0633  •  ipratropium-albuterol (DUO-NEB) nebulizer solution 3 mL, 3 mL, Nebulization, Q4H PRN, Kartik Arriaga MD  •  levoFLOXacin (LEVAQUIN) tablet 750 mg, 750 mg, Oral, Q24H, Kartik Arriaga MD, 750 mg at 05/09/19 1444  •  levothyroxine (SYNTHROID, LEVOTHROID) tablet 25 mcg, 25 mcg, Oral, Q AM, Kartik Arriaga MD, 25 mcg at 05/10/19 0633  •  montelukast (SINGULAIR) tablet 10 mg, 10 mg, Oral, Nightly, Kartik Arriaga MD, 10 mg at 05/09/19 1938  •  ondansetron (ZOFRAN) injection 4 mg, 4 mg, Intravenous, Q6H PRN, Kartik Arriaga MD  •  pantoprazole (PROTONIX) EC tablet 40 mg, 40 mg, Oral, BID AC, Kartik Arriaga MD, 40 mg at 05/10/19 0633  •  phenol (CHLORASEPTIC) 1.4 % liquid 2 spray, 2 spray, Mouth/Throat, Q2H PRN, Kartki Arriaga MD  •  sirolimus (RAPAMUNE) tablet 3 mg, 3 mg, Oral, Daily, Kartik Arriaga MD, 3 mg at 05/10/19 0826  •  [COMPLETED] Insert peripheral IV, , , Once **AND** sodium chloride 0.9 % flush 10 mL, 10 mL, Intravenous, PRN, Mike Garnett MD, 10 mL at 05/09/19 0804  •  spironolactone (ALDACTONE) tablet 100 mg, 100 mg, Oral, Daily, Kartik Arriaga MD, 100 mg at 05/10/19 0826      History reviewed. No pertinent family history.  Social History     Socioeconomic History   • Marital status:      Spouse name: Not on file   • Number of children: Not on file   • Years of education: Not on file   • Highest education level: Not on file   Tobacco Use   • Smoking status: Former Smoker     Packs/day: 0.50     Years: 15.00     Pack years: 7.50     Types: Cigarettes     Last attempt to quit: 2007     Years since quitting:  12.3   • Smokeless tobacco: Never Used   Substance and Sexual Activity   • Alcohol use: No   • Drug use: No   • Sexual activity: Defer     Objective   Past Surgical History:   Procedure Laterality Date   • APPENDECTOMY     • EPIDURAL BLOCK     • INTERVENTIONAL RADIOLOGY PROCEDURE Right 3/22/2017    Procedure: ARTHROCENTESIS ASPIRATION RIGHT KNEE ;  Surgeon: Bennett Resendiz DO;  Location: [facility];  Service:    • KNEE SURGERY Bilateral    • LIVER TRANSPLANTATION      2010 DEC   • LUMBAR DISCECTOMY FUSION INSTRUMENTATION Bilateral 3/22/2017    Procedure: BILATERAL L 4-5 MICROLAMINECTOMY WITH METRIX ;  Surgeon: Bennett Resendiz DO;  Location: [facility] OR;  Service:    • LUMBAR DISCECTOMY FUSION INSTRUMENTATION Bilateral 5/12/2017    Procedure: Minimally invasive transforaminal lumbar interbody fusion lumbar four to lumbar five;  Surgeon: Bennett Resendiz DO;  Location: [facility];  Service:    • SHOULDER SURGERY Right      Past Medical History:   Diagnosis Date   • Acid reflux    • Aortic stenosis    • Arthritis    • Asthma    • CKD (chronic kidney disease) stage 3, GFR 30-59 ml/min (CMS/HCC) 5/9/2019   • Hepatitis C     HAD LIVER TRANSPLANT 2010   • History of atrial fibrillation    • Hypertension    • Hypothyroidism    • Immunosuppressed status (CMS/[facility])    • Liver transplant recipient (CMS/[facility])    • Low back pain     PAIN DOWN BOTH LEGS       Vital Sign Min/Max for last 24 hours  Temp  Min: 97.7 °F (36.5 °C)  Max: 99.2 °F (37.3 °C)   BP  Min: 145/65  Max: 183/88    Pulse  Min: 90  Max: 106     Wt Readings from Last 3 Encounters:   05/10/19 94.8 kg (209 lb 1.6 oz)   05/11/17 99.8 kg (220 lb)   05/09/17 99.8 kg (220 lb)        Physical Exam:      General Appearance:    Alert, cooperative, in no acute distress   Head:    Normocephalic, without obvious abnormality, atraumatic   Eyes:            Conjunctivae normal, no   icterus   Neck:   No adenopathy, supple, trachea midline, no thyromegaly, bilateralcarotid bruit,  no JVD   Lungs:    Few crackles mainly right base    Heart:    Regular rhythm and normal rate, normal S1 and S2,            2-3 over 6 harsh systolic murmur, no gallop, no rub, no click   Chest Wall:    No abnormalities observed   Abdomen:     Normal bowel sounds, no masses, no organomegaly, soft        non-tender, non-distended, no guarding, no rebound                tenderness   Rectal:     Deferred   Extremities:  0 to trace edema. Moves all extremities well, no cyanosis, no erythema   Pulses:   Pulses palpable and equal bilaterally   Skin:   No bleeding, bruising or rash   Neurologic:   Cranial nerves 2 - 12 grossly intact, sensation intact, DTR       present and equal bilaterally        Results Review:     I reviewed the patient's new clinical results.  Potassium   Date Value Ref Range Status   05/10/2019 4.5 3.5 - 5.2 mmol/L Final     Creatinine   Date Value Ref Range Status   05/10/2019 1.40 (H) 0.76 - 1.27 mg/dL Final     Troponin T   Date Value Ref Range Status   05/08/2019 <0.010 0.000 - 0.030 ng/mL Final      Echo EF Estimated  )  Lab Results   Component Value Date    ECHOEFEST 65 05/09/2019       Sodium Sodium   Date Value Ref Range Status   05/10/2019 132 (L) 136 - 145 mmol/L Final   05/09/2019 132 (L) 136 - 145 mmol/L Final   05/08/2019 135 (L) 136 - 145 mmol/L Final      Potassium Potassium   Date Value Ref Range Status   05/10/2019 4.5 3.5 - 5.2 mmol/L Final   05/09/2019 4.6 3.5 - 5.2 mmol/L Final   05/08/2019 5.2 3.5 - 5.2 mmol/L Final      Chloride Chloride   Date Value Ref Range Status   05/10/2019 96 (L) 98 - 107 mmol/L Final   05/09/2019 96 (L) 98 - 107 mmol/L Final   05/08/2019 100 98 - 107 mmol/L Final      Bicarbonate No results found for: PLASMABICARB   BUN BUN   Date Value Ref Range Status   05/10/2019 27 (H) 8 - 23 mg/dL Final   05/09/2019 27 (H) 8 - 23 mg/dL Final   05/08/2019 25 (H) 8 - 23 mg/dL Final      Creatinine Creatinine   Date Value Ref Range Status   05/10/2019 1.40 (H) 0.76 -  1.27 mg/dL Final   05/09/2019 1.46 (H) 0.76 - 1.27 mg/dL Final   05/08/2019 1.48 (H) 0.76 - 1.27 mg/dL Final      Calcium Calcium   Date Value Ref Range Status   05/10/2019 9.3 8.6 - 10.5 mg/dL Final   05/09/2019 9.6 8.6 - 10.5 mg/dL Final   05/08/2019 9.8 8.6 - 10.5 mg/dL Final      Magnesium No results found for: MG     Results from last 7 days   Lab Units 05/10/19  0451   WBC 10*3/mm3 6.12   HEMOGLOBIN g/dL 9.7*   HEMATOCRIT % 29.8*   PLATELETS 10*3/mm3 410     Lab Results   Lab Value Date/Time    TROPONINT <0.010 05/08/2019 1549     Lab Results   Component Value Date    CHOL 117 05/10/2019     Lab Results   Component Value Date    HDL 56 05/10/2019     Lab Results   Component Value Date    LDL 37 05/10/2019     Lab Results   Component Value Date    TRIG 121 05/10/2019     No components found for: CHOLHDL       Assessment/ Plan      HTN (hypertension)    Acute congestive heart failure (CMS/[facility])    Acute sinusitis    History of atrial fibrillation    Aortic stenosis    Liver transplant recipient (CMS/[facility])    Immunosuppressed status (CMS/[facility])    CKD (chronic kidney disease) stage 3, GFR 30-59 ml/min (CMS/HCC)  In December 2017 on an event recorder he had a 2-minute episode of atrial fibrillation but no documented recurrence has been seen since then, has been in sinus rhythm while here  Chronic anemia  History of hepatitis C  Carotid artery disease, right internal proximal carotid artery has a 50 to 69% stenosis and 50 to 69% stenosis on the left.  Study was done 1/30/2019.  Plan #1 BNP was elevated initially and has decreased with the diuretics.  Was not any clear indication on CT or chest x-ray of definite heart failure.  I think at this time we could change him back to oral furosemide 40 mg twice a day.  He is already received a morning dose of IV furosemide.  At home he says he was taking 40 mg once a day.  He thinks his swelling is improved with the diuretics but he does not seem to think his shortness of air  is a great deal better.  2.  Much of his trouble seems to be upper airway difficulties and he thinks his tongue is swollen he has fullness in his throat and difficulty swallowing.  ENT consult is pending  #3 his last echocardiogram in May/2018 showed mild to moderate aortic stenosis.  His mean gradient then was 17 and the maximal gradient was 32.  Mean gradient has increased to 30 on the study here with a maximum gradient of 58.  Will pursue further evaluation of this later, do not think its part of the acute problem at this time.  #4 decide about any need for stress testing later.  Frank Claros MD  05/10/19  10:12 AM      Time: 49 min performing the history and physical reviewing all the current data on computer here and obtaining and reviewing our prior office records.

## 2019-05-10 NOTE — CONSULTS
"Asked to see gentleman with COPD/asthma and aortic stenosis admitted with SOB, progressive sore throat and nasal congestion.  Pt now on Levaquin and showing some improvement. States \"I've been coughing for 4 months\".  ST progressed over last week.  Pt aware of septal deviation and \"bad sinuses\".  Denies hyposmia.      Past history and all available data pertinent to the hospitalization reviewed.    PE:  Comfortable; mild trismus; winces with swallow    TM clear  Left septal deviation  Oropharynx negative  Tongue tender to palpation  Neck negative    Flex scope:  Leftward septal deviation severe with spurring; mucopus from right middle meatus;no polyps    Lingual tonsils very full and mildly erythematous; larynx negative; hypopharynx negative    CT:  Swollen lingual tonsils, symmetric; moderate sinusitis involving ethmoid, maxillary, frontal; no suggestion of fungal or unusual etiology; no polyps or masses    IMP:  Probable chronic sinusitis; septal deviation with spur; resolving lingual tonsillitis; negative endoscopy for neoplasm, abscess etc.    REC:  Continue antibiotic therapy for full 21 days; flonase nasal spray on discharge; followup ent office exam after discharge    "

## 2019-05-11 LAB
ANION GAP SERPL CALCULATED.3IONS-SCNC: 16.2 MMOL/L
BASOPHILS # BLD AUTO: 0.01 10*3/MM3 (ref 0–0.2)
BASOPHILS NFR BLD AUTO: 0.2 % (ref 0–1.5)
BUN BLD-MCNC: 33 MG/DL (ref 8–23)
BUN/CREAT SERPL: 20.1 (ref 7–25)
CALCIUM SPEC-SCNC: 9.6 MG/DL (ref 8.6–10.5)
CHLORIDE SERPL-SCNC: 92 MMOL/L (ref 98–107)
CO2 SERPL-SCNC: 20.8 MMOL/L (ref 22–29)
CREAT BLD-MCNC: 1.64 MG/DL (ref 0.76–1.27)
DEPRECATED RDW RBC AUTO: 41.1 FL (ref 37–54)
EOSINOPHIL # BLD AUTO: 0.04 10*3/MM3 (ref 0–0.4)
EOSINOPHIL NFR BLD AUTO: 0.6 % (ref 0.3–6.2)
ERYTHROCYTE [DISTWIDTH] IN BLOOD BY AUTOMATED COUNT: 12.9 % (ref 12.3–15.4)
GFR SERPL CREATININE-BSD FRML MDRD: 43 ML/MIN/1.73
GLUCOSE BLD-MCNC: 142 MG/DL (ref 65–99)
HCT VFR BLD AUTO: 28.5 % (ref 37.5–51)
HGB BLD-MCNC: 9.4 G/DL (ref 13–17.7)
IMM GRANULOCYTES # BLD AUTO: 0.03 10*3/MM3 (ref 0–0.05)
IMM GRANULOCYTES NFR BLD AUTO: 0.5 % (ref 0–0.5)
LYMPHOCYTES # BLD AUTO: 0.82 10*3/MM3 (ref 0.7–3.1)
LYMPHOCYTES NFR BLD AUTO: 13 % (ref 19.6–45.3)
MCH RBC QN AUTO: 28.6 PG (ref 26.6–33)
MCHC RBC AUTO-ENTMCNC: 33 G/DL (ref 31.5–35.7)
MCV RBC AUTO: 86.6 FL (ref 79–97)
MONOCYTES # BLD AUTO: 1.02 10*3/MM3 (ref 0.1–0.9)
MONOCYTES NFR BLD AUTO: 16.2 % (ref 5–12)
NEUTROPHILS # BLD AUTO: 4.38 10*3/MM3 (ref 1.7–7)
NEUTROPHILS NFR BLD AUTO: 69.5 % (ref 42.7–76)
NRBC BLD AUTO-RTO: 0.2 /100 WBC (ref 0–0.2)
NT-PROBNP SERPL-MCNC: 743 PG/ML (ref 5–900)
PLATELET # BLD AUTO: 440 10*3/MM3 (ref 140–450)
PMV BLD AUTO: 8.9 FL (ref 6–12)
POTASSIUM BLD-SCNC: 4.2 MMOL/L (ref 3.5–5.2)
RBC # BLD AUTO: 3.29 10*6/MM3 (ref 4.14–5.8)
SODIUM BLD-SCNC: 129 MMOL/L (ref 136–145)
WBC NRBC COR # BLD: 6.3 10*3/MM3 (ref 3.4–10.8)

## 2019-05-11 PROCEDURE — 83880 ASSAY OF NATRIURETIC PEPTIDE: CPT | Performed by: HOSPITALIST

## 2019-05-11 PROCEDURE — 25010000002 ENOXAPARIN PER 10 MG: Performed by: HOSPITALIST

## 2019-05-11 PROCEDURE — 86003 ALLG SPEC IGE CRUDE XTRC EA: CPT | Performed by: INTERNAL MEDICINE

## 2019-05-11 PROCEDURE — 94799 UNLISTED PULMONARY SVC/PX: CPT

## 2019-05-11 PROCEDURE — 80048 BASIC METABOLIC PNL TOTAL CA: CPT | Performed by: HOSPITALIST

## 2019-05-11 PROCEDURE — 85025 COMPLETE CBC W/AUTO DIFF WBC: CPT | Performed by: HOSPITALIST

## 2019-05-11 PROCEDURE — 25010000002 HYDROMORPHONE PER 4 MG: Performed by: HOSPITALIST

## 2019-05-11 PROCEDURE — 25010000002 FUROSEMIDE PER 20 MG: Performed by: HOSPITALIST

## 2019-05-11 PROCEDURE — 82785 ASSAY OF IGE: CPT | Performed by: INTERNAL MEDICINE

## 2019-05-11 PROCEDURE — 63710000001 SIROLIMUS 0.5 MG TABLET: Performed by: HOSPITALIST

## 2019-05-11 RX ORDER — OXYMETAZOLINE HYDROCHLORIDE 0.05 G/100ML
2 SPRAY NASAL 2 TIMES DAILY
Status: DISCONTINUED | OUTPATIENT
Start: 2019-05-11 | End: 2019-05-12

## 2019-05-11 RX ORDER — FUROSEMIDE 40 MG/1
40 TABLET ORAL DAILY
Status: DISCONTINUED | OUTPATIENT
Start: 2019-05-11 | End: 2019-05-13 | Stop reason: HOSPADM

## 2019-05-11 RX ORDER — LEVOFLOXACIN 750 MG/1
750 TABLET ORAL EVERY 24 HOURS
Status: DISCONTINUED | OUTPATIENT
Start: 2019-05-12 | End: 2019-05-13 | Stop reason: HOSPADM

## 2019-05-11 RX ADMIN — FUROSEMIDE 20 MG: 10 INJECTION, SOLUTION INTRAMUSCULAR; INTRAVENOUS at 06:26

## 2019-05-11 RX ADMIN — SODIUM CHLORIDE, PRESERVATIVE FREE 10 ML: 5 INJECTION INTRAVENOUS at 20:51

## 2019-05-11 RX ADMIN — BUDESONIDE AND FORMOTEROL FUMARATE DIHYDRATE 2 PUFF: 160; 4.5 AEROSOL RESPIRATORY (INHALATION) at 20:31

## 2019-05-11 RX ADMIN — HYDRALAZINE HYDROCHLORIDE 75 MG: 50 TABLET, FILM COATED ORAL at 08:07

## 2019-05-11 RX ADMIN — HYDROCODONE POLISTIREX AND CHLORPHENIRAMINE POLISITREX 5 ML: 10; 8 SUSPENSION, EXTENDED RELEASE ORAL at 00:04

## 2019-05-11 RX ADMIN — BUDESONIDE AND FORMOTEROL FUMARATE DIHYDRATE 2 PUFF: 160; 4.5 AEROSOL RESPIRATORY (INHALATION) at 11:03

## 2019-05-11 RX ADMIN — HYDROMORPHONE HYDROCHLORIDE 0.5 MG: 1 INJECTION, SOLUTION INTRAMUSCULAR; INTRAVENOUS; SUBCUTANEOUS at 16:26

## 2019-05-11 RX ADMIN — HYDROMORPHONE HYDROCHLORIDE 0.5 MG: 1 INJECTION, SOLUTION INTRAMUSCULAR; INTRAVENOUS; SUBCUTANEOUS at 12:02

## 2019-05-11 RX ADMIN — Medication 2 SPRAY: at 08:10

## 2019-05-11 RX ADMIN — GUAIFENESIN 600 MG: 600 TABLET, EXTENDED RELEASE ORAL at 08:07

## 2019-05-11 RX ADMIN — HYDROMORPHONE HYDROCHLORIDE 0.5 MG: 1 INJECTION, SOLUTION INTRAMUSCULAR; INTRAVENOUS; SUBCUTANEOUS at 00:04

## 2019-05-11 RX ADMIN — PANTOPRAZOLE SODIUM 40 MG: 40 TABLET, DELAYED RELEASE ORAL at 16:27

## 2019-05-11 RX ADMIN — LEVOTHYROXINE SODIUM 25 MCG: 25 TABLET ORAL at 06:25

## 2019-05-11 RX ADMIN — SIROLIMUS 3 MG: 0.5 TABLET, SUGAR COATED ORAL at 08:07

## 2019-05-11 RX ADMIN — SPIRONOLACTONE 100 MG: 100 TABLET ORAL at 08:08

## 2019-05-11 RX ADMIN — HYDRALAZINE HYDROCHLORIDE 75 MG: 50 TABLET, FILM COATED ORAL at 20:50

## 2019-05-11 RX ADMIN — LEVOFLOXACIN 750 MG: 750 TABLET, FILM COATED ORAL at 12:00

## 2019-05-11 RX ADMIN — ATORVASTATIN CALCIUM 10 MG: 10 TABLET, FILM COATED ORAL at 20:50

## 2019-05-11 RX ADMIN — DILTIAZEM HYDROCHLORIDE 120 MG: 120 CAPSULE, COATED, EXTENDED RELEASE ORAL at 08:07

## 2019-05-11 RX ADMIN — HYDRALAZINE HYDROCHLORIDE 75 MG: 50 TABLET, FILM COATED ORAL at 16:27

## 2019-05-11 RX ADMIN — PANTOPRAZOLE SODIUM 40 MG: 40 TABLET, DELAYED RELEASE ORAL at 07:36

## 2019-05-11 RX ADMIN — FUROSEMIDE 40 MG: 40 TABLET ORAL at 12:35

## 2019-05-11 RX ADMIN — GUAIFENESIN 600 MG: 600 TABLET, EXTENDED RELEASE ORAL at 20:49

## 2019-05-11 RX ADMIN — MONTELUKAST SODIUM 10 MG: 10 TABLET, FILM COATED ORAL at 20:50

## 2019-05-11 RX ADMIN — ENOXAPARIN SODIUM 40 MG: 40 INJECTION SUBCUTANEOUS at 11:57

## 2019-05-11 RX ADMIN — SALINE NASAL SPRAY 2 SPRAY: 1.5 SOLUTION NASAL at 08:10

## 2019-05-11 RX ADMIN — HYDROMORPHONE HYDROCHLORIDE 0.5 MG: 1 INJECTION, SOLUTION INTRAMUSCULAR; INTRAVENOUS; SUBCUTANEOUS at 06:26

## 2019-05-11 RX ADMIN — HYDROCODONE POLISTIREX AND CHLORPHENIRAMINE POLISITREX 5 ML: 10; 8 SUSPENSION, EXTENDED RELEASE ORAL at 21:12

## 2019-05-11 RX ADMIN — HYDROMORPHONE HYDROCHLORIDE 0.5 MG: 1 INJECTION, SOLUTION INTRAMUSCULAR; INTRAVENOUS; SUBCUTANEOUS at 21:12

## 2019-05-11 RX ADMIN — ASPIRIN 81 MG: 81 TABLET, CHEWABLE ORAL at 08:06

## 2019-05-11 NOTE — PROGRESS NOTES
LOS: 3 days   Patient Care Team:  Ankit Mcmahon MD as PCP - General (Family Medicine)  Shari Cuellar MD as Consulting Physician (Cardiology)    Subjective     Patient reports she is feeling so much better since he came in his cough is significantly improved his voice is improved he is been able to sleep because he is not coughing so much.    Review of Systems:   Patient reports he has been fighting this cough since January he initially got a short course of antibiotics for 5 days but nothing antibiotic wise since.       Objective     Vital Signs  Vital Sign Min/Max for last 24 hours  Temp  Min: 97.7 °F (36.5 °C)  Max: 98.4 °F (36.9 °C)   BP  Min: 139/86  Max: 163/84   Pulse  Min: 80  Max: 102   Resp  Min: 16  Max: 16   SpO2  Min: 91 %  Max: 94 %   No Data Recorded   Weight  Min: 95.5 kg (210 lb 9.6 oz)  Max: 95.5 kg (210 lb 9.6 oz)        Ventilator/Non-Invasive Ventilation Settings (From admission, onward)    None                       Body mass index is 32.29 kg/m².  I/O last 3 completed shifts:  In: 840 [P.O.:840]  Out: -   I/O this shift:  In: 240 [P.O.:240]  Out: -         Physical Exam:  General Appearance: Well-developed white male resting comfortably in bed does not appear in any acute distress  Eyes: Conjunctiva are clear and anicteric  ENT: Mucous membranes are little dry no erythema no exudates he has a Mallampati type III airway  Neck: Short obese trachea midline no visible jugular venous distention  Lungs: Clear nonlabored symmetric expansion no wheezes rales or rhonchi no cough while I was in the room even with deep respiration  Cardiac: Regular rate rhythm no murmur I could not hear a aortic area murmur.  Abdomen: Soft no palpable organomegaly or masses  : Not examined  Musculoskeletal: Grossly normal  Skin: No jaundice no petechiae noted  Neuro: He is alert oriented cooperative following commands grossly intact  Extremities/P Vascular: No clubbing no cyanosis no edema palpable radial  dorsalis pedis pulses bilaterally  MSE: Seems to be in pretty good spirits today       Labs:  Results from last 7 days   Lab Units 05/11/19  0510 05/10/19  0451 05/09/19  0557 05/08/19  1549   GLUCOSE mg/dL 142* 119* 115* 112*   SODIUM mmol/L 129* 132* 132* 135*   POTASSIUM mmol/L 4.2 4.5 4.6 5.2   CO2 mmol/L 20.8* 23.0 19.8* 20.6*   CHLORIDE mmol/L 92* 96* 96* 100   ANION GAP mmol/L 16.2 13.0 16.2 14.4   CREATININE mg/dL 1.64* 1.40* 1.46* 1.48*   BUN mg/dL 33* 27* 27* 25*   BUN / CREAT RATIO  20.1 19.3 18.5 16.9   CALCIUM mg/dL 9.6 9.3 9.6 9.8   EGFR IF NONAFRICN AM mL/min/1.73 43* 51* 49* 48*   ALK PHOS U/L  --  109 106 115   TOTAL PROTEIN g/dL  --  7.2 7.7 8.1   ALT (SGPT) U/L  --  15 17 19   AST (SGOT) U/L  --  18 20 29   BILIRUBIN mg/dL  --  0.2 <0.2* <0.2*   ALBUMIN g/dL  --  3.20* 4.00 4.00   GLOBULIN gm/dL  --  4.0 3.7 4.1     Estimated Creatinine Clearance: 51.4 mL/min (A) (by C-G formula based on SCr of 1.64 mg/dL (H)).      Results from last 7 days   Lab Units 05/11/19  0510 05/10/19  0451 05/09/19  0557 05/08/19  1549   WBC 10*3/mm3 6.30 6.12 7.34 6.25   RBC 10*6/mm3 3.29* 3.41* 3.42* 3.60*   HEMOGLOBIN g/dL 9.4* 9.7* 9.8* 10.4*   HEMATOCRIT % 28.5* 29.8* 30.2* 31.5*   MCV fL 86.6 87.4 88.3 87.5   MCH pg 28.6 28.4 28.7 28.9   MCHC g/dL 33.0 32.6 32.5 33.0   RDW % 12.9 13.2 13.2 13.2   RDW-SD fl 41.1 41.8 42.4 42.3   MPV fL 8.9 9.1 8.9 9.1   PLATELETS 10*3/mm3 440 410 408 430   NEUTROPHIL % % 69.5 63.2  --  81.6*   LYMPHOCYTE % % 13.0* 13.1*  --  7.0*   MONOCYTES % % 16.2* 22.7*  --  10.4   EOSINOPHIL % % 0.6 0.5  --  0.3   BASOPHIL % % 0.2 0.2  --  0.2   IMM GRAN % % 0.5  --   --  0.5   NEUTROS ABS 10*3/mm3 4.38 3.87  --  5.10   LYMPHS ABS 10*3/mm3 0.82 0.80  --  0.44*   MONOS ABS 10*3/mm3 1.02* 1.39*  --  0.65   EOS ABS 10*3/mm3 0.04 0.03  --  0.02   BASOS ABS 10*3/mm3 0.01 0.01  --  0.01   IMMATURE GRANS (ABS) 10*3/mm3 0.03  --   --  0.03   NRBC /100 WBC 0.2  --   --  0.0         Results from last 7  days   Lab Units 05/08/19  1549   TROPONIN T ng/mL <0.010     Results from last 7 days   Lab Units 05/11/19  0510 05/10/19  0451 05/08/19  1549   PROBNP pg/mL 743.0 944.9* 2,137.0*     Results from last 7 days   Lab Units 05/10/19  0451   TSH mIU/mL 2.690     Results from last 7 days   Lab Units 05/10/19  0453 05/10/19  0451   LACTATE mmol/L 0.5  --    PROCALCITONIN ng/mL  --  0.20         Microbiology Results (last 10 days)     Procedure Component Value - Date/Time    Respiratory Panel, PCR - Swab, Nasopharynx [[id number]]  (Normal) Collected:  05/09/19 1655    Lab Status:  Final result Specimen:  Swab from Nasopharynx Updated:  05/09/19 2005     ADENOVIRUS, PCR Not Detected     Coronavirus 229E Not Detected     Coronavirus HKU1 Not Detected     Coronavirus NL63 Not Detected     Coronavirus OC43 Not Detected     Human Metapneumovirus Not Detected     Human Rhinovirus/Enterovirus Not Detected     Influenza B PCR Not Detected     Parainfluenza Virus 1 Not Detected     Parainfluenza Virus 2 Not Detected     Parainfluenza Virus 3 Not Detected     Parainfluenza Virus 4 Not Detected     Bordetella pertussis pcr Not Detected     Influenza A H1 2009 PCR Not Detected     Chlamydophila pneumoniae PCR Not Detected     Mycoplasma pneumo by PCR Not Detected     Influenza A PCR Not Detected     Influenza A H3 Not Detected     Influenza A H1 Not Detected     RSV, PCR Not Detected     Bordetella parapertussis PCR Not Detected                aspirin 81 mg Oral Daily   atorvastatin 10 mg Oral Nightly   budesonide-formoterol 2 puff Inhalation BID - RT   diltiaZEM  mg Oral Q24H   enoxaparin 40 mg Subcutaneous Q24H   furosemide 40 mg Oral Daily   guaiFENesin 600 mg Oral Q12H   hydrALAZINE 75 mg Oral TID   [START ON 5/12/2019] levoFLOXacin 750 mg Oral Q24H   levothyroxine 25 mcg Oral Q AM   montelukast 10 mg Oral Nightly   oxymetazoline 2 spray Each Nare BID   pantoprazole 40 mg Oral BID AC   sirolimus 3 mg Oral Daily    spironolactone 100 mg Oral Daily          Diagnostics:  Xr Chest 2 View    Result Date: 5/8/2019  XR CHEST 2 VW-  HISTORY: Male who is 63 years-old,  short of breath  TECHNIQUE: Frontal and lateral views of the chest  COMPARISON: 03/16/2017  FINDINGS: Heart, mediastinum and pulmonary vasculature are unremarkable. No focal pulmonary consolidation, pleural effusion, or pneumothorax. No acute osseous process.      No evidence for acute pulmonary process. Follow-up as clinical indications persist.  This report was finalized on 5/8/2019 4:06 PM by Dr. Ernesto Carver M.D.      Ct Soft Tissue Neck With Contrast    Result Date: 5/8/2019  CT NECK SOFT TISSUES WITH IV CONTRAST, CT ANGIOGRAM CHEST WITH IV CONTRAST  HISTORY: 63-year-old male with cough and shortness of air that began in January. Sore throat with pain with swallowing and nasal congestion. Left neck swelling.  TECHNIQUE: Neck CT includes axial imaging with intravenous contrast. Site of swelling was marked with a BB. CT angiogram chest includes axial imaging from the thoracic inlet to the upper abdomen with IV contrast and this data was reconstructed in coronal and sagittal planes and 3-dimensional volume rendering was performed.  COMPARISON: There are no previous neck or chest CTs for comparison.  FINDINGS  NECK: A BB was placed at the site of clinical interest and this BB is on the skin surface within the left lateral submandibular region. Underlying this marker there is normal-appearing subcutaneous fat. No soft tissue mass is evident in this region. There is no evidence for submandibular/submental kajal enlargement. The parotid glands, submandibular glands, thyroid gland appear within normal limits. There is mild thickening of the oropharyngeal lymphoid tissue in Waldeyer's ring. There is no evidence for abscess or fluid collection. Carotid vascular calcifications are present of the carotid bulbs and ICA origins. There is moderate frontal sinus mucosal  thickening. Severe ethmoid and moderate right and mild-to-moderate left maxillary sinus mucosal thickening is present. There is also mucosal thickening involving the left sphenoid sinus. Partial opacification is present in bilateral mastoid air cells. No kajal enlargement is evident. There is degenerative disc disease in the cervical spine with disc space narrowing best demonstrated at C5-6 and C6-7.  CT ANGIOGRAM CHEST: Evaluation for embolus in the distal segmental and subsegmental branch is very limited due to streak artifact as this patient was scanned with his arms to his side and due to patient's body type. No central embolus is evident. Heart size is enlarged. Within the right lower lobe there are 2 adjacent nodules each measuring 5 mm on image 102. This may be inflammatory or infectious though recommend follow-up evaluation. There is also an 8 mm nodule within the left upper lobe on image 83. Imaging through the upper abdomen demonstrates diffuse fat infiltration of the liver. There is gynecomastia.      1. Limited evaluation for pulmonary emboli in the distal segmental and subsegmental branches. No central embolus is evident. Cardiomegaly. 2. Two right lower lobe 5 mm nodules and an inferior left upper lobe 8 mm nodule for which follow-up evaluation with chest CT is recommended in 4-6 months. 3. At the site of palpable interest in  the left neck there is no evidence for underlying soft tissue mass or kajal enlargement. There is mild circumferential thickening of the oropharyngeal lymphoid tissue without abscess or fluid collection. 4. Carotid atherosclerotic calcifications. 5. Degenerative disc disease in the cervical spine. 6. Inflammatory paranasal sinus disease. Partial opacification bilateral mastoid air cells.  Discussed with Dr. Garnett in the emergency department 05/08/2019 at 5:35 PM  Radiation dose reduction techniques were utilized, including automated exposure control and exposure modulation  based on body size.  This report was finalized on 5/8/2019 6:48 PM by Dr. Rojas Andres M.D.      Ct Angiogram Chest With Contrast    Result Date: 5/8/2019  CT NECK SOFT TISSUES WITH IV CONTRAST, CT ANGIOGRAM CHEST WITH IV CONTRAST  HISTORY: 63-year-old male with cough and shortness of air that began in January. Sore throat with pain with swallowing and nasal congestion. Left neck swelling.  TECHNIQUE: Neck CT includes axial imaging with intravenous contrast. Site of swelling was marked with a BB. CT angiogram chest includes axial imaging from the thoracic inlet to the upper abdomen with IV contrast and this data was reconstructed in coronal and sagittal planes and 3-dimensional volume rendering was performed.  COMPARISON: There are no previous neck or chest CTs for comparison.  FINDINGS  NECK: A BB was placed at the site of clinical interest and this BB is on the skin surface within the left lateral submandibular region. Underlying this marker there is normal-appearing subcutaneous fat. No soft tissue mass is evident in this region. There is no evidence for submandibular/submental kajal enlargement. The parotid glands, submandibular glands, thyroid gland appear within normal limits. There is mild thickening of the oropharyngeal lymphoid tissue in Waldeyer's ring. There is no evidence for abscess or fluid collection. Carotid vascular calcifications are present of the carotid bulbs and ICA origins. There is moderate frontal sinus mucosal thickening. Severe ethmoid and moderate right and mild-to-moderate left maxillary sinus mucosal thickening is present. There is also mucosal thickening involving the left sphenoid sinus. Partial opacification is present in bilateral mastoid air cells. No kajal enlargement is evident. There is degenerative disc disease in the cervical spine with disc space narrowing best demonstrated at C5-6 and C6-7.  CT ANGIOGRAM CHEST: Evaluation for embolus in the distal segmental and  subsegmental branch is very limited due to streak artifact as this patient was scanned with his arms to his side and due to patient's body type. No central embolus is evident. Heart size is enlarged. Within the right lower lobe there are 2 adjacent nodules each measuring 5 mm on image 102. This may be inflammatory or infectious though recommend follow-up evaluation. There is also an 8 mm nodule within the left upper lobe on image 83. Imaging through the upper abdomen demonstrates diffuse fat infiltration of the liver. There is gynecomastia.      1. Limited evaluation for pulmonary emboli in the distal segmental and subsegmental branches. No central embolus is evident. Cardiomegaly. 2. Two right lower lobe 5 mm nodules and an inferior left upper lobe 8 mm nodule for which follow-up evaluation with chest CT is recommended in 4-6 months. 3. At the site of palpable interest in  the left neck there is no evidence for underlying soft tissue mass or kajal enlargement. There is mild circumferential thickening of the oropharyngeal lymphoid tissue without abscess or fluid collection. 4. Carotid atherosclerotic calcifications. 5. Degenerative disc disease in the cervical spine. 6. Inflammatory paranasal sinus disease. Partial opacification bilateral mastoid air cells.  Discussed with Dr. Garnett in the emergency department 05/08/2019 at 5:35 PM  Radiation dose reduction techniques were utilized, including automated exposure control and exposure modulation based on body size.  This report was finalized on 5/8/2019 6:48 PM by Dr. Rojas Andres M.D.      Results for orders placed during the hospital encounter of 05/08/19   Adult Transthoracic Echo Complete W/ Cont if Necessary Per Protocol    Narrative · Aortic valve dimensionless index is 0.4.  · Estimated EF = 65%.  · Left ventricular systolic function is normal.  · There is moderate calcification of the aortic valve.  · Moderate to severe aortic valve stenosis is  present.  · Mild mitral valve regurgitation is present  · There is a small (<1cm) circumferential pericardial effusion.              Active Hospital Problems    Diagnosis  POA   • Acute sinusitis [J01.90]  Unknown   • CKD (chronic kidney disease) stage 3, GFR 30-59 ml/min (CMS/[facility]) [N18.3]  Unknown   • History of atrial fibrillation [Z86.79]  Not Applicable   • Aortic stenosis [I35.0]  Yes   • Liver transplant recipient (CMS/HCC) [Z94.4]  Not Applicable   • Immunosuppressed status (CMS/HCC) [D89.9]  Yes   • Acute congestive heart failure (CMS/[facility]) [I50.9]  Yes   • HTN (hypertension) [I10]  Yes      Resolved Hospital Problems   No resolved problems to display.         Assessment/Plan     1. Chronic cough patient reports it is significantly improved over the last couple of days he is actually been able to sleep and his voice is coming back.  Certainly sounds like it is related to postnasal drainage from his chronic sinusitis plus or minus an element of reflux  2. Chronic sinusitis with septal deviation and spur ENT recommends a full 21 days of antibiotic therapy and continued Flonase at discharge  3. Asthma  4. Pulmonary nodule 8 mm follow-up CT in 3 months follow-up with Dr. Garcia after CT scan.  5. Anemia hemoglobin relatively stable.[id number]  6. Hyponatremia sodium falling need to monitor  7. Chronic kidney disease creatinine is up slightly need to monitor this as well  8. Aortic stenosis moderate to severe  9. Gastroesophageal reflux disease    Plan for disposition:    Roldan Renteria MD  05/11/19  3:44 PM    Time:

## 2019-05-11 NOTE — PLAN OF CARE
Problem: Patient Care Overview  Goal: Plan of Care Review  Outcome: Ongoing (interventions implemented as appropriate)   05/11/19 1640   OTHER   Outcome Summary Complaint of throat pain. PRN Dilaudid given x3 per request. Up at [place]. Continues on po Levaquin. VSS.      Goal: Individualization and Mutuality  Outcome: Ongoing (interventions implemented as appropriate)    Goal: Discharge Needs Assessment  Outcome: Ongoing (interventions implemented as appropriate)      Problem: Cardiac: Heart Failure (Adult)  Goal: Signs and Symptoms of Listed Potential Problems Will be Absent, Minimized or Managed (Cardiac: Heart Failure)  Outcome: Ongoing (interventions implemented as appropriate)      Problem: Pain, Acute (Adult)  Goal: Identify Related Risk Factors and Signs and Symptoms  Outcome: Ongoing (interventions implemented as appropriate)    Goal: Acceptable Pain Control/Comfort Level  Outcome: Ongoing (interventions implemented as appropriate)      Problem: Skin Injury Risk (Adult)  Goal: Identify Related Risk Factors and Signs and Symptoms  Outcome: Ongoing (interventions implemented as appropriate)    Goal: Skin Health and Integrity  Outcome: Ongoing (interventions implemented as appropriate)

## 2019-05-11 NOTE — PROGRESS NOTES
"Daily progress note    Chief complaint  Doing same  No new complaint    History of present illness  63-year-old white male with history of asthma aortic stenosis gastroesophageal reflux disease hepatitis C hypothyroidism chronic atrial fibrillation low back pain hypertension and COPD presented to [facility] emergency with shortness of breath for last several months which is getting worse also have nonproductive cough sore throat with difficulty swallowing and congestion.  Patient denies any chest pain fever chills abdominal pain nausea vomiting diarrhea.  Patient evaluated in ER found to have decompensated CHF and acute sinusitis admit for management.     REVIEW OF SYSTEMS  Review of Systems   Constitutional: Negative for activity change, appetite change and fever.   HENT: Positive for congestion, sore throat and trouble swallowing.    Eyes: Negative.    Respiratory: Positive for shortness of breath. Negative for cough.    Cardiovascular: Negative for chest pain and leg swelling.   Gastrointestinal: Negative for abdominal pain, diarrhea and vomiting.   Endocrine: Negative.    Genitourinary: Negative for decreased urine volume and dysuria.   Musculoskeletal: Negative for neck pain.   Skin: Negative for rash and wound.   Allergic/Immunologic: Negative.    Neurological: Negative for weakness, numbness and headaches.   Hematological: Negative.    Psychiatric/Behavioral: Negative.    All other systems reviewed and are negative.     PHYSICAL EXAM  Blood pressure 139/86, pulse 80, temperature 98.4 °F (36.9 °C), temperature source Oral, resp. rate 16, height 172 cm (67.72\"), weight 95.5 kg (210 lb 9.6 oz), SpO2 94 %.    Constitutional: He is oriented to person, place, and time. No distress.   Head: Normocephalic and atraumatic.   Eyes: EOM are normal. Pupils are equal, round, and reactive to light.   Neck: Normal range of motion. Neck supple.   Cardiovascular: Regular rhythm. Tachycardia present.   Murmur " heard.  Pulmonary/Chest: Effort normal and breath sounds normal. No respiratory distress.   Abdominal: Soft. There is no tenderness. There is no rebound and no guarding.   Musculoskeletal: Normal range of motion. He exhibits edema (2+ BLE).   Neurological: He is alert and oriented to person, place, and time. He has normal sensation and normal strength.   Skin: Skin is warm and dry.   Psychiatric: Mood and affect normal.     LAB RESULTS  Lab Results (last 24 hours)     Procedure Component Value Units Date/Time    Basic Metabolic Panel [[id number]]  (Abnormal) Collected:  05/11/19 0510    Specimen:  Blood Updated:  05/11/19 0600     Glucose 142 mg/dL      BUN 33 mg/dL      Creatinine 1.64 mg/dL      Sodium 129 mmol/L      Potassium 4.2 mmol/L      Chloride 92 mmol/L      CO2 20.8 mmol/L      Calcium 9.6 mg/dL      eGFR Non African Amer 43 mL/min/1.73      BUN/Creatinine Ratio 20.1     Anion Gap 16.2 mmol/L     Narrative:       GFR Normal >60  Chronic Kidney Disease <60  Kidney Failure <15    BNP [[id number]]  (Normal) Collected:  05/11/19 0510    Specimen:  Blood Updated:  05/11/19 0600     proBNP 743.0 pg/mL     Narrative:       Among patients with dyspnea, NT-proBNP is highly sensitive for the detection of acute congestive heart failure. In addition NT-proBNP of <300 pg/ml effectively rules out acute congestive heart failure with 99% negative predictive value.    CBC & Differential [[id number]] Collected:  05/11/19 0510    Specimen:  Blood Updated:  05/11/19 0535    Narrative:       The following orders were created for panel order CBC & Differential.  Procedure                               Abnormality         Status                     ---------                               -----------         ------                     CBC Auto Differential[[id number]]        Abnormal            Final result                 Please view results for these tests on the individual orders.    CBC Auto Differential [[id number]]  (Abnormal)  Collected:  05/11/19 0510    Specimen:  Blood Updated:  05/11/19 0535     WBC 6.30 10*3/mm3      RBC 3.29 10*6/mm3      Hemoglobin 9.4 g/dL      Hematocrit 28.5 %      MCV 86.6 fL      MCH 28.6 pg      MCHC 33.0 g/dL      RDW 12.9 %      RDW-SD 41.1 fl      MPV 8.9 fL      Platelets 440 10*3/mm3      Neutrophil % 69.5 %      Lymphocyte % 13.0 %      Monocyte % 16.2 %      Eosinophil % 0.6 %      Basophil % 0.2 %      Immature Grans % 0.5 %      Neutrophils, Absolute 4.38 10*3/mm3      Lymphocytes, Absolute 0.82 10*3/mm3      Monocytes, Absolute 1.02 10*3/mm3      Eosinophils, Absolute 0.04 10*3/mm3      Basophils, Absolute 0.01 10*3/mm3      Immature Grans, Absolute 0.03 10*3/mm3      nRBC 0.2 /100 WBC     IgE [[id number]] Collected:  05/11/19 0510    Specimen:  Blood Updated:  05/11/19 0528    Allergen Profile, Mini - Panel [[id number]] Collected:  05/11/19 0510    Specimen:  Blood Updated:  05/11/19 0528        Imaging Results (last 24 hours)     ** No results found for the last 24 hours. **        ECG 12 Lead   Order: [id number]   Status:  Final result   Visible to patient:  No (Not Released) Next appt:  None      Narrative     HEART RATE= 104  bpm  RR Interval= 580  ms  SD Interval= 165  ms  P Horizontal Axis= 9  deg  P Front Axis= 55  deg  QRSD Interval= 93  ms  QT Interval= 332  ms  QRS Axis= 10  deg  T Wave Axis= 72  deg  - ABNORMAL ECG -  Sinus tachycardia  POOR R WAVE PROGRESSION  NO SIGNIFICANT CHANGE FROM PREVIOUS ECG             Current Facility-Administered Medications:   •  albuterol sulfate HFA (PROVENTIL HFA;VENTOLIN HFA;PROAIR HFA) inhaler 2 puff, 2 puff, Inhalation, Q4H PRN, Kartik Arriaga MD, 2 puff at 05/09/19 0652  •  aspirin chewable tablet 81 mg, 81 mg, Oral, Daily, Kartik Arriaga MD, 81 mg at 05/11/19 0806  •  atorvastatin (LIPITOR) tablet 10 mg, 10 mg, Oral, Nightly, Kartik Arriaga MD, 10 mg at 05/10/19 2055  •  budesonide-formoterol (SYMBICORT) 160-4.5 MCG/ACT inhaler 2 puff, 2 puff, Inhalation,  BID - RT, Kartik Arriaga MD, 2 puff at 05/11/19 1103  •  diltiaZEM CD (CARDIZEM CD) 24 hr capsule 120 mg, 120 mg, Oral, Q24H, Kartik Arriaga MD, 120 mg at 05/11/19 0807  •  enoxaparin (LOVENOX) syringe 40 mg, 40 mg, Subcutaneous, Q24H, Kartik Arriaga MD, 40 mg at 05/11/19 1157  •  furosemide (LASIX) tablet 40 mg, 40 mg, Oral, Daily, Dawood Santamaria MD, 40 mg at 05/11/19 1235  •  guaiFENesin (MUCINEX) 12 hr tablet 600 mg, 600 mg, Oral, Q12H, Kartik Arriaga MD, 600 mg at 05/11/19 0807  •  hydrALAZINE (APRESOLINE) tablet 75 mg, 75 mg, Oral, TID, Kartik Arriaga MD, 75 mg at 05/11/19 0807  •  HYDROcod Polst-CPM Polst ER (TUSSIONEX PENNKINETIC) 10-8 MG/5ML ER suspension 5 mL, 5 mL, Oral, BID PRN, Kartik Arriaga MD, 5 mL at 05/11/19 0004  •  HYDROmorphone (DILAUDID) injection 0.5 mg, 0.5 mg, Intravenous, Q4H PRN, Kartik Arriaga MD, 0.5 mg at 05/11/19 1202  •  ipratropium-albuterol (DUO-NEB) nebulizer solution 3 mL, 3 mL, Nebulization, Q4H PRN, Kartik Arriaga MD  •  levoFLOXacin (LEVAQUIN) tablet 750 mg, 750 mg, Oral, Q24H, Kartik Arriaga MD, 750 mg at 05/11/19 1200  •  levothyroxine (SYNTHROID, LEVOTHROID) tablet 25 mcg, 25 mcg, Oral, Q AM, Kartik Arriaga MD, 25 mcg at 05/11/19 0625  •  montelukast (SINGULAIR) tablet 10 mg, 10 mg, Oral, Nightly, Kartik Arriaga MD, 10 mg at 05/10/19 2055  •  ondansetron (ZOFRAN) injection 4 mg, 4 mg, Intravenous, Q6H PRN, Kartik Arriaga MD  •  oxymetazoline (AFRIN) nasal spray 2 spray, 2 spray, Each Nare, BID, Sebas Garcia MD, 2 spray at 05/11/19 0810  •  pantoprazole (PROTONIX) EC tablet 40 mg, 40 mg, Oral, BID AC, Kartik Arriaga MD, 40 mg at 05/11/19 0736  •  phenol (CHLORASEPTIC) 1.4 % liquid 2 spray, 2 spray, Mouth/Throat, Q2H PRN, Kartik Arriaga MD, 2 spray at 05/10/19 1446  •  sirolimus (RAPAMUNE) tablet 3 mg, 3 mg, Oral, Daily, Kartik Arriaga MD, 3 mg at 05/11/19 0807  •  sodium chloride (OCEAN) nasal spray 2 spray, 2 spray, Each Nare, PRN, Sebas Garcia MD, 2 spray at 05/11/19 0810  •   [COMPLETED] Insert peripheral IV, , , Once **AND** sodium chloride 0.9 % flush 10 mL, 10 mL, Intravenous, PRN, Mike Garnett MD, 10 mL at 05/10/19 2055  •  spironolactone (ALDACTONE) tablet 100 mg, 100 mg, Oral, Daily, Wolfgang Arriaga MD, 100 mg at 05/11/19 0808     ASSESSMENT  Acute on chronic diastolic CHF  COPD/asthma  Lung nodules  Aortic stenosis  Hypertension  Hyperlipidemia  Hypothyroidism  Gastroesophageal reflux disease    PLAN  CPM  Supplemental oxygen nebulizer  IV diuresis  Cardiology and pulmonary input appreciated  ENT input appreciated  Antibiotics for 21 days  Continue home medications  Stress ulcer DVT prophylaxis  Supportive care  Symptomatic treatment for cough congestion  PT/OT  Follow closely further recommendation according to hospital course    WOLFGANG ARRIAGA MD

## 2019-05-11 NOTE — PLAN OF CARE
"Problem: Patient Care Overview  Goal: Individualization and Mutuality  Outcome: Ongoing (interventions implemented as appropriate)    Goal: Discharge Needs Assessment  Outcome: Ongoing (interventions implemented as appropriate)    Goal: Interprofessional Rounds/Family Conf  Outcome: Ongoing (interventions implemented as appropriate)   05/11/19 0144   Interdisciplinary Rounds/Family Conf   Summary Patient is alert, cooperative with care, and states he is feeling \"a little bit better\". Continues with nasal pressure, sore throat, and strong non=productive cough. He is tolerating frozen treats well to help decrease throat pain.       Problem: Cardiac: Heart Failure (Adult)  Goal: Signs and Symptoms of Listed Potential Problems Will be Absent, Minimized or Managed (Cardiac: Heart Failure)  Outcome: Ongoing (interventions implemented as appropriate)      Problem: Pain, Acute (Adult)  Goal: Identify Related Risk Factors and Signs and Symptoms  Outcome: Ongoing (interventions implemented as appropriate)    Goal: Acceptable Pain Control/Comfort Level  Outcome: Ongoing (interventions implemented as appropriate)      Problem: Skin Injury Risk (Adult)  Goal: Identify Related Risk Factors and Signs and Symptoms  Outcome: Ongoing (interventions implemented as appropriate)    Goal: Skin Health and Integrity  Outcome: Ongoing (interventions implemented as appropriate)        "

## 2019-05-11 NOTE — PROGRESS NOTES
Milton RAMIREZ Henry   63 y.o.  male    LOS: 3 days   Patient Care Team:  Ankit Mcmahon MD as PCP - General (Family Medicine)  Shari Cuellar MD as Consulting Physician (Cardiology)      Subjective Patient is slept well today  He is breathing better    Interval History:     Patient Complaints:     Review of Systems:       Medication Review:   Current Facility-Administered Medications:   •  albuterol sulfate HFA (PROVENTIL HFA;VENTOLIN HFA;PROAIR HFA) inhaler 2 puff, 2 puff, Inhalation, Q4H PRN, Kartik Arriaga MD, 2 puff at 05/09/19 0652  •  aspirin chewable tablet 81 mg, 81 mg, Oral, Daily, Kartik Arriaga MD, 81 mg at 05/11/19 0806  •  atorvastatin (LIPITOR) tablet 10 mg, 10 mg, Oral, Nightly, Kartik Arriaga MD, 10 mg at 05/10/19 2055  •  budesonide-formoterol (SYMBICORT) 160-4.5 MCG/ACT inhaler 2 puff, 2 puff, Inhalation, BID - RT, Kartik Arriaga MD, 2 puff at 05/11/19 1103  •  diltiaZEM CD (CARDIZEM CD) 24 hr capsule 120 mg, 120 mg, Oral, Q24H, Kartik Arriaga MD, 120 mg at 05/11/19 0807  •  enoxaparin (LOVENOX) syringe 40 mg, 40 mg, Subcutaneous, Q24H, Kartik Arriaga MD, 40 mg at 05/10/19 1443  •  furosemide (LASIX) injection 20 mg, 20 mg, Intravenous, Q12H, Kartik Arriaga MD, 20 mg at 05/11/19 0626  •  guaiFENesin (MUCINEX) 12 hr tablet 600 mg, 600 mg, Oral, Q12H, Kartik Arriaga MD, 600 mg at 05/11/19 0807  •  hydrALAZINE (APRESOLINE) tablet 75 mg, 75 mg, Oral, TID, Kartik Arriaga MD, 75 mg at 05/11/19 0807  •  HYDROcod Polst-CPM Polst ER (TUSSIONEX PENNKINETIC) 10-8 MG/5ML ER suspension 5 mL, 5 mL, Oral, BID PRN, Kartik Arriaga MD, 5 mL at 05/11/19 0004  •  HYDROmorphone (DILAUDID) injection 0.5 mg, 0.5 mg, Intravenous, Q4H PRN, Kartik Arriaga MD, 0.5 mg at 05/11/19 0626  •  ipratropium-albuterol (DUO-NEB) nebulizer solution 3 mL, 3 mL, Nebulization, Q4H PRN, Kartik Arriaga MD  •  levoFLOXacin (LEVAQUIN) tablet 750 mg, 750 mg, Oral, Q24H, Kartik Arriaga MD, 750 mg at 05/10/19 1444  •  levothyroxine (SYNTHROID, LEVOTHROID) tablet  25 mcg, 25 mcg, Oral, Q AM, Kartik Arriaga MD, 25 mcg at 05/11/19 0625  •  montelukast (SINGULAIR) tablet 10 mg, 10 mg, Oral, Nightly, Kartik Arriaga MD, 10 mg at 05/10/19 2055  •  ondansetron (ZOFRAN) injection 4 mg, 4 mg, Intravenous, Q6H PRN, Kartik Arriaga MD  •  oxymetazoline (AFRIN) nasal spray 2 spray, 2 spray, Each Nare, BID, Sebas Garcia MD, 2 spray at 05/11/19 0810  •  pantoprazole (PROTONIX) EC tablet 40 mg, 40 mg, Oral, BID AC, Kartik Arriaga MD, 40 mg at 05/11/19 0736  •  phenol (CHLORASEPTIC) 1.4 % liquid 2 spray, 2 spray, Mouth/Throat, Q2H PRN, Kartik Arriaga MD, 2 spray at 05/10/19 1446  •  sirolimus (RAPAMUNE) tablet 3 mg, 3 mg, Oral, Daily, Kartik Arriaga MD, 3 mg at 05/11/19 0807  •  sodium chloride (OCEAN) nasal spray 2 spray, 2 spray, Each Nare, PRN, Sebas Garcia MD, 2 spray at 05/11/19 0810  •  [COMPLETED] Insert peripheral IV, , , Once **AND** sodium chloride 0.9 % flush 10 mL, 10 mL, Intravenous, PRN, Mike Garnett MD, 10 mL at 05/10/19 2055  •  spironolactone (ALDACTONE) tablet 100 mg, 100 mg, Oral, Daily, Kartik Arriaga MD, 100 mg at 05/11/19 0808      Objective   Vital Sign Min/Max for last 24 hours  Temp  Min: 97.9 °F (36.6 °C)  Max: 98.4 °F (36.9 °C)   BP  Min: 118/86  Max: 163/84    Pulse  Min: 80  Max: 102     Wt Readings from Last 3 Encounters:   05/11/19 95.5 kg (210 lb 9.6 oz)   05/11/17 99.8 kg (220 lb)   05/09/17 99.8 kg (220 lb)        Intake/Output Summary (Last 24 hours) at 5/11/2019 1112  Last data filed at 5/10/2019 1800  Gross per 24 hour   Intake 240 ml   Output --   Net 240 ml     Physical Exam:      General Appearance:    Well developed and well nourished in no acute distress   Head:    Normocephalic, atraumatic   Eyes:            Conjunctivae normal, non icteric, no xanthelasma   Neck:   supple, trachea midline, no thyromegaly, no carotid bruit, no jvd, no elevated cvp   Lungs:     Clear to auscultation,respirations regular, even and                  unlabored     Heart:    Regular rhythm and normal rate, normal S1 and S2,            No murmur, no gallop, no rub, no click   Chest Wall:    No abnormalities observed   Abdomen:     Normal bowel sounds, no masses, no organomegaly, soft        non-tender, non-distended, no guarding, no rebound                tenderness   Rectal:     Deferred   Extremities:   No edema. Moves all extremities well, no cyanosis, no erythema   Pulses:   Pulses palpable and equal bilaterally   Skin:   No bleeding, bruising or rash   Neurologic:   awake alert and oriented x3, speech clear and approp, no facial drooping     :    Monitor:      Results Review:         Sodium Sodium   Date Value Ref Range Status   05/11/2019 129 (L) 136 - 145 mmol/L Final   05/10/2019 132 (L) 136 - 145 mmol/L Final   05/09/2019 132 (L) 136 - 145 mmol/L Final   05/08/2019 135 (L) 136 - 145 mmol/L Final      Potassium Potassium   Date Value Ref Range Status   05/11/2019 4.2 3.5 - 5.2 mmol/L Final   05/10/2019 4.5 3.5 - 5.2 mmol/L Final   05/09/2019 4.6 3.5 - 5.2 mmol/L Final   05/08/2019 5.2 3.5 - 5.2 mmol/L Final      Chloride Chloride   Date Value Ref Range Status   05/11/2019 92 (L) 98 - 107 mmol/L Final   05/10/2019 96 (L) 98 - 107 mmol/L Final   05/09/2019 96 (L) 98 - 107 mmol/L Final   05/08/2019 100 98 - 107 mmol/L Final      Bicarbonate No results found for: PLASMABICARB   BUN BUN   Date Value Ref Range Status   05/11/2019 33 (H) 8 - 23 mg/dL Final   05/10/2019 27 (H) 8 - 23 mg/dL Final   05/09/2019 27 (H) 8 - 23 mg/dL Final   05/08/2019 25 (H) 8 - 23 mg/dL Final      Creatinine Creatinine   Date Value Ref Range Status   05/11/2019 1.64 (H) 0.76 - 1.27 mg/dL Final   05/10/2019 1.40 (H) 0.76 - 1.27 mg/dL Final   05/09/2019 1.46 (H) 0.76 - 1.27 mg/dL Final   05/08/2019 1.48 (H) 0.76 - 1.27 mg/dL Final      Calcium Calcium   Date Value Ref Range Status   05/11/2019 9.6 8.6 - 10.5 mg/dL Final   05/10/2019 9.3 8.6 - 10.5 mg/dL Final   05/09/2019 9.6 8.6 - 10.5 mg/dL Final    05/08/2019 9.8 8.6 - 10.5 mg/dL Final      Magnesium No results found for: MG     Results from last 7 days   Lab Units 05/11/19  0510   WBC 10*3/mm3 6.30   HEMOGLOBIN g/dL 9.4*   HEMATOCRIT % 28.5*   PLATELETS 10*3/mm3 440     Lab Results   Lab Value Date/Time    TROPONINT <0.010 05/08/2019 1549     Lab Results   Component Value Date    CHOL 117 05/10/2019     Lab Results   Component Value Date    HDL 56 05/10/2019     Lab Results   Component Value Date    LDL 37 05/10/2019     Lab Results   Component Value Date    TRIG 121 05/10/2019     No components found for: CHOLHDL  No results found for: PTT  No components found for: PT/INR  Lab Results   Component Value Date    HGBA1C 5.50 05/10/2019      Lab Results   Component Value Date    TSH 2.690 05/10/2019        Echo EF Estimated  Lab Results   Component Value Date    ECHOEFEST 65 05/09/2019         Assessment/ Plan    Active Hospital Problems    Diagnosis POA   • Acute sinusitis [J01.90] Unknown   • CKD (chronic kidney disease) stage 3, GFR 30-59 ml/min (CMS/[facility]) [N18.3] Unknown   • History of atrial fibrillation [Z86.79] Not Applicable   • Aortic stenosis [I35.0] Yes   • Liver transplant recipient (CMS/[facility]) [Z94.4] Not Applicable   • Immunosuppressed status (CMS/[facility]) [D89.9] Yes   • Acute congestive heart failure (CMS/[facility]) [I50.9] Yes   • HTN (hypertension) [I10] Yes     Patient has moderate to severe aortic stenosis.  To continue antibiotics and Flonase per ENT  Will switch to p.o. Diuretics.  Eventually he needs right and left heart catheterization.        Dawood Santamaria MD  05/11/19  11:12 AM

## 2019-05-12 LAB
ANION GAP SERPL CALCULATED.3IONS-SCNC: 12.7 MMOL/L
BUN BLD-MCNC: 28 MG/DL (ref 8–23)
BUN/CREAT SERPL: 19.4 (ref 7–25)
CALCIUM SPEC-SCNC: 9.1 MG/DL (ref 8.6–10.5)
CHLORIDE SERPL-SCNC: 97 MMOL/L (ref 98–107)
CO2 SERPL-SCNC: 24.3 MMOL/L (ref 22–29)
CREAT BLD-MCNC: 1.44 MG/DL (ref 0.76–1.27)
FERRITIN SERPL-MCNC: 102 NG/ML (ref 30–400)
FOLATE SERPL-MCNC: 14 NG/ML (ref 4.78–24.2)
GFR SERPL CREATININE-BSD FRML MDRD: 50 ML/MIN/1.73
GLUCOSE BLD-MCNC: 173 MG/DL (ref 65–99)
HEMOCCULT STL QL: NEGATIVE
IRON 24H UR-MRATE: 44 MCG/DL (ref 59–158)
IRON SATN MFR SERPL: 14 % (ref 20–50)
LDH SERPL-CCNC: 170 U/L (ref 135–225)
NT-PROBNP SERPL-MCNC: 696.9 PG/ML (ref 5–900)
POTASSIUM BLD-SCNC: 4.4 MMOL/L (ref 3.5–5.2)
RETICS # AUTO: 0.03 10*6/MM3 (ref 0.02–0.13)
RETICS/RBC NFR AUTO: 1.01 % (ref 0.7–1.9)
SODIUM BLD-SCNC: 134 MMOL/L (ref 136–145)
TIBC SERPL-MCNC: 311 MCG/DL (ref 298–536)
TRANSFERRIN SERPL-MCNC: 209 MG/DL (ref 200–360)
VIT B12 BLD-MCNC: 1363 PG/ML (ref 211–946)

## 2019-05-12 PROCEDURE — 83540 ASSAY OF IRON: CPT | Performed by: HOSPITALIST

## 2019-05-12 PROCEDURE — 25010000002 ENOXAPARIN PER 10 MG: Performed by: HOSPITALIST

## 2019-05-12 PROCEDURE — 83615 LACTATE (LD) (LDH) ENZYME: CPT | Performed by: HOSPITALIST

## 2019-05-12 PROCEDURE — 25010000002 HYDROMORPHONE PER 4 MG: Performed by: HOSPITALIST

## 2019-05-12 PROCEDURE — 83880 ASSAY OF NATRIURETIC PEPTIDE: CPT | Performed by: HOSPITALIST

## 2019-05-12 PROCEDURE — 63710000001 SIROLIMUS 0.5 MG TABLET: Performed by: HOSPITALIST

## 2019-05-12 PROCEDURE — 82272 OCCULT BLD FECES 1-3 TESTS: CPT | Performed by: HOSPITALIST

## 2019-05-12 PROCEDURE — 94799 UNLISTED PULMONARY SVC/PX: CPT

## 2019-05-12 PROCEDURE — 82746 ASSAY OF FOLIC ACID SERUM: CPT | Performed by: HOSPITALIST

## 2019-05-12 PROCEDURE — 84466 ASSAY OF TRANSFERRIN: CPT | Performed by: HOSPITALIST

## 2019-05-12 PROCEDURE — 80048 BASIC METABOLIC PNL TOTAL CA: CPT | Performed by: HOSPITALIST

## 2019-05-12 PROCEDURE — 85045 AUTOMATED RETICULOCYTE COUNT: CPT | Performed by: HOSPITALIST

## 2019-05-12 PROCEDURE — 82607 VITAMIN B-12: CPT | Performed by: HOSPITALIST

## 2019-05-12 PROCEDURE — 82728 ASSAY OF FERRITIN: CPT | Performed by: HOSPITALIST

## 2019-05-12 RX ORDER — OXYMETAZOLINE HYDROCHLORIDE 0.05 G/100ML
2 SPRAY NASAL 2 TIMES DAILY
Status: DISCONTINUED | OUTPATIENT
Start: 2019-05-12 | End: 2019-05-13

## 2019-05-12 RX ADMIN — GUAIFENESIN 600 MG: 600 TABLET, EXTENDED RELEASE ORAL at 08:06

## 2019-05-12 RX ADMIN — HYDROMORPHONE HYDROCHLORIDE 0.5 MG: 1 INJECTION, SOLUTION INTRAMUSCULAR; INTRAVENOUS; SUBCUTANEOUS at 03:17

## 2019-05-12 RX ADMIN — BUDESONIDE AND FORMOTEROL FUMARATE DIHYDRATE 2 PUFF: 160; 4.5 AEROSOL RESPIRATORY (INHALATION) at 20:40

## 2019-05-12 RX ADMIN — ASPIRIN 81 MG: 81 TABLET, CHEWABLE ORAL at 08:06

## 2019-05-12 RX ADMIN — ATORVASTATIN CALCIUM 10 MG: 10 TABLET, FILM COATED ORAL at 21:40

## 2019-05-12 RX ADMIN — LEVOFLOXACIN 750 MG: 750 TABLET, FILM COATED ORAL at 12:26

## 2019-05-12 RX ADMIN — PANTOPRAZOLE SODIUM 40 MG: 40 TABLET, DELAYED RELEASE ORAL at 07:12

## 2019-05-12 RX ADMIN — GUAIFENESIN 600 MG: 600 TABLET, EXTENDED RELEASE ORAL at 21:40

## 2019-05-12 RX ADMIN — HYDROMORPHONE HYDROCHLORIDE 0.5 MG: 1 INJECTION, SOLUTION INTRAMUSCULAR; INTRAVENOUS; SUBCUTANEOUS at 08:06

## 2019-05-12 RX ADMIN — HYDROMORPHONE HYDROCHLORIDE 0.5 MG: 1 INJECTION, SOLUTION INTRAMUSCULAR; INTRAVENOUS; SUBCUTANEOUS at 18:06

## 2019-05-12 RX ADMIN — Medication 2 SPRAY: at 08:07

## 2019-05-12 RX ADMIN — Medication 2 SPRAY: at 03:17

## 2019-05-12 RX ADMIN — LEVOTHYROXINE SODIUM 25 MCG: 25 TABLET ORAL at 06:30

## 2019-05-12 RX ADMIN — BUDESONIDE AND FORMOTEROL FUMARATE DIHYDRATE 2 PUFF: 160; 4.5 AEROSOL RESPIRATORY (INHALATION) at 08:29

## 2019-05-12 RX ADMIN — HYDRALAZINE HYDROCHLORIDE 75 MG: 50 TABLET, FILM COATED ORAL at 16:14

## 2019-05-12 RX ADMIN — ALBUTEROL SULFATE 2 PUFF: 90 AEROSOL, METERED RESPIRATORY (INHALATION) at 03:51

## 2019-05-12 RX ADMIN — PANTOPRAZOLE SODIUM 40 MG: 40 TABLET, DELAYED RELEASE ORAL at 16:14

## 2019-05-12 RX ADMIN — DILTIAZEM HYDROCHLORIDE 120 MG: 120 CAPSULE, COATED, EXTENDED RELEASE ORAL at 08:06

## 2019-05-12 RX ADMIN — MONTELUKAST SODIUM 10 MG: 10 TABLET, FILM COATED ORAL at 21:40

## 2019-05-12 RX ADMIN — ENOXAPARIN SODIUM 40 MG: 40 INJECTION SUBCUTANEOUS at 12:25

## 2019-05-12 RX ADMIN — OXYMETAZOLINE HYDROCHLORIDE 2 SPRAY: 5 SPRAY NASAL at 21:40

## 2019-05-12 RX ADMIN — HYDRALAZINE HYDROCHLORIDE 75 MG: 50 TABLET, FILM COATED ORAL at 08:06

## 2019-05-12 RX ADMIN — SPIRONOLACTONE 100 MG: 100 TABLET ORAL at 08:06

## 2019-05-12 RX ADMIN — HYDROMORPHONE HYDROCHLORIDE 0.5 MG: 1 INJECTION, SOLUTION INTRAMUSCULAR; INTRAVENOUS; SUBCUTANEOUS at 22:35

## 2019-05-12 RX ADMIN — HYDRALAZINE HYDROCHLORIDE 75 MG: 50 TABLET, FILM COATED ORAL at 21:40

## 2019-05-12 RX ADMIN — HYDROMORPHONE HYDROCHLORIDE 0.5 MG: 1 INJECTION, SOLUTION INTRAMUSCULAR; INTRAVENOUS; SUBCUTANEOUS at 12:26

## 2019-05-12 RX ADMIN — FUROSEMIDE 40 MG: 40 TABLET ORAL at 08:06

## 2019-05-12 RX ADMIN — SIROLIMUS 3 MG: 0.5 TABLET, SUGAR COATED ORAL at 08:07

## 2019-05-12 NOTE — PLAN OF CARE
Problem: Patient Care Overview  Goal: Plan of Care Review  Outcome: Ongoing (interventions implemented as appropriate)   05/12/19 4387   Coping/Psychosocial   Plan of Care Reviewed With patient   OTHER   Outcome Summary C/O sore throat. PRN Dilaudid for relief. Up at [facility]. PO Levaquin. possible discharge tomorrow.      Goal: Discharge Needs Assessment  Outcome: Ongoing (interventions implemented as appropriate)      Problem: Cardiac: Heart Failure (Adult)  Goal: Signs and Symptoms of Listed Potential Problems Will be Absent, Minimized or Managed (Cardiac: Heart Failure)  Outcome: Ongoing (interventions implemented as appropriate)      Problem: Pain, Acute (Adult)  Goal: Identify Related Risk Factors and Signs and Symptoms  Outcome: Ongoing (interventions implemented as appropriate)    Goal: Acceptable Pain Control/Comfort Level  Outcome: Ongoing (interventions implemented as appropriate)      Problem: Skin Injury Risk (Adult)  Goal: Identify Related Risk Factors and Signs and Symptoms  Outcome: Ongoing (interventions implemented as appropriate)    Goal: Skin Health and Integrity  Outcome: Ongoing (interventions implemented as appropriate)

## 2019-05-12 NOTE — PROGRESS NOTES
LOS: 4 days   Patient Care Team:  Ankit Mcmahon MD as PCP - General (Family Medicine)  Shari Cuellar MD as Consulting Physician (Cardiology)    Subjective     Patient reports she is feeling so much better since he came in his cough is significantly improved his voice is improved he is been able to sleep because he is not coughing so much.    Review of Systems:   Patient reports he has been fighting this cough since January he initially got a short course of antibiotics for 5 days but nothing antibiotic wise since.       Objective     Vital Signs  Vital Sign Min/Max for last 24 hours  Temp  Min: 98 °F (36.7 °C)  Max: 98.2 °F (36.8 °C)   BP  Min: 138/73  Max: 150/84   Pulse  Min: 82  Max: 106   Resp  Min: 16  Max: 18   SpO2  Min: 92 %  Max: 95 %   No Data Recorded   Weight  Min: 94.6 kg (208 lb 8 oz)  Max: 94.6 kg (208 lb 8 oz)        Ventilator/Non-Invasive Ventilation Settings (From admission, onward)    None                       Body mass index is 31.97 kg/m².  I/O last 3 completed shifts:  In: 240 [P.O.:240]  Out: -   I/O this shift:  In: 600 [P.O.:600]  Out: -         Physical Exam:  General Appearance: Well-developed white male resting comfortably in bed does not appear in any acute distress  Eyes: Conjunctiva are clear and anicteric  ENT: Mucous membranes are little dry no erythema no exudates he has a Mallampati type III airway  Neck: Short obese trachea midline no visible jugular venous distention  Lungs: Clear nonlabored symmetric expansion no wheezes rales or rhonchi no cough while I was in the room even with deep respiration  Cardiac: Regular rate rhythm no murmur I could not hear a aortic area murmur.  Abdomen: Soft no palpable organomegaly or masses  : Not examined  Musculoskeletal: Grossly normal  Skin: No jaundice no petechiae noted  Neuro: He is alert oriented cooperative following commands grossly intact  Extremities/P Vascular: No clubbing no cyanosis no edema palpable radial  dorsalis pedis pulses bilaterally  MSE: Seems to be in pretty good spirits today       Labs:  Results from last 7 days   Lab Units 05/12/19  0832 05/11/19  0510 05/10/19  0451 05/09/19  0557 05/08/19  1549   GLUCOSE mg/dL 173* 142* 119* 115* 112*   SODIUM mmol/L 134* 129* 132* 132* 135*   POTASSIUM mmol/L 4.4 4.2 4.5 4.6 5.2   CO2 mmol/L 24.3 20.8* 23.0 19.8* 20.6*   CHLORIDE mmol/L 97* 92* 96* 96* 100   ANION GAP mmol/L 12.7 16.2 13.0 16.2 14.4   CREATININE mg/dL 1.44* 1.64* 1.40* 1.46* 1.48*   BUN mg/dL 28* 33* 27* 27* 25*   BUN / CREAT RATIO  19.4 20.1 19.3 18.5 16.9   CALCIUM mg/dL 9.1 9.6 9.3 9.6 9.8   EGFR IF NONAFRICN AM mL/min/1.73 50* 43* 51* 49* 48*   ALK PHOS U/L  --   --  109 106 115   TOTAL PROTEIN g/dL  --   --  7.2 7.7 8.1   ALT (SGPT) U/L  --   --  15 17 19   AST (SGOT) U/L  --   --  18 20 29   BILIRUBIN mg/dL  --   --  0.2 <0.2* <0.2*   ALBUMIN g/dL  --   --  3.20* 4.00 4.00   GLOBULIN gm/dL  --   --  4.0 3.7 4.1     Estimated Creatinine Clearance: 58.3 mL/min (A) (by C-G formula based on SCr of 1.44 mg/dL (H)).      Results from last 7 days   Lab Units 05/11/19  0510 05/10/19  0451 05/09/19  0557 05/08/19  1549   WBC 10*3/mm3 6.30 6.12 7.34 6.25   RBC 10*6/mm3 3.29* 3.41* 3.42* 3.60*   HEMOGLOBIN g/dL 9.4* 9.7* 9.8* 10.4*   HEMATOCRIT % 28.5* 29.8* 30.2* 31.5*   MCV fL 86.6 87.4 88.3 87.5   MCH pg 28.6 28.4 28.7 28.9   MCHC g/dL 33.0 32.6 32.5 33.0   RDW % 12.9 13.2 13.2 13.2   RDW-SD fl 41.1 41.8 42.4 42.3   MPV fL 8.9 9.1 8.9 9.1   PLATELETS 10*3/mm3 440 410 408 430   NEUTROPHIL % % 69.5 63.2  --  81.6*   LYMPHOCYTE % % 13.0* 13.1*  --  7.0*   MONOCYTES % % 16.2* 22.7*  --  10.4   EOSINOPHIL % % 0.6 0.5  --  0.3   BASOPHIL % % 0.2 0.2  --  0.2   IMM GRAN % % 0.5  --   --  0.5   NEUTROS ABS 10*3/mm3 4.38 3.87  --  5.10   LYMPHS ABS 10*3/mm3 0.82 0.80  --  0.44*   MONOS ABS 10*3/mm3 1.02* 1.39*  --  0.65   EOS ABS 10*3/mm3 0.04 0.03  --  0.02   BASOS ABS 10*3/mm3 0.01 0.01  --  0.01   IMMATURE  GRANS (ABS) 10*3/mm3 0.03  --   --  0.03   NRBC /100 WBC 0.2  --   --  0.0         Results from last 7 days   Lab Units 05/08/19  1549   TROPONIN T ng/mL <0.010     Results from last 7 days   Lab Units 05/12/19  0832 05/11/19  0510 05/10/19  0451 05/08/19  1549   PROBNP pg/mL 696.9 743.0 944.9* 2,137.0*     Results from last 7 days   Lab Units 05/10/19  0451   TSH mIU/mL 2.690     Results from last 7 days   Lab Units 05/10/19  0453 05/10/19  0451   LACTATE mmol/L 0.5  --    PROCALCITONIN ng/mL  --  0.20         Microbiology Results (last 10 days)     Procedure Component Value - Date/Time    Respiratory Panel, PCR - Swab, Nasopharynx [[id number]]  (Normal) Collected:  05/09/19 8118    Lab Status:  Final result Specimen:  Swab from Nasopharynx Updated:  05/09/19 2005     ADENOVIRUS, PCR Not Detected     Coronavirus 229E Not Detected     Coronavirus HKU1 Not Detected     Coronavirus NL63 Not Detected     Coronavirus OC43 Not Detected     Human Metapneumovirus Not Detected     Human Rhinovirus/Enterovirus Not Detected     Influenza B PCR Not Detected     Parainfluenza Virus 1 Not Detected     Parainfluenza Virus 2 Not Detected     Parainfluenza Virus 3 Not Detected     Parainfluenza Virus 4 Not Detected     Bordetella pertussis pcr Not Detected     Influenza A H1 2009 PCR Not Detected     Chlamydophila pneumoniae PCR Not Detected     Mycoplasma pneumo by PCR Not Detected     Influenza A PCR Not Detected     Influenza A H3 Not Detected     Influenza A H1 Not Detected     RSV, PCR Not Detected     Bordetella parapertussis PCR Not Detected                aspirin 81 mg Oral Daily   atorvastatin 10 mg Oral Nightly   budesonide-formoterol 2 puff Inhalation BID - RT   diltiaZEM  mg Oral Q24H   enoxaparin 40 mg Subcutaneous Q24H   furosemide 40 mg Oral Daily   guaiFENesin 600 mg Oral Q12H   hydrALAZINE 75 mg Oral TID   levoFLOXacin 750 mg Oral Q24H   levothyroxine 25 mcg Oral Q AM   montelukast 10 mg Oral Nightly    oxymetazoline 2 spray Each Nare BID   pantoprazole 40 mg Oral BID AC   sirolimus 3 mg Oral Daily   spironolactone 100 mg Oral Daily          Diagnostics:  Xr Chest 2 View    Result Date: 5/8/2019  XR CHEST 2 VW-  HISTORY: Male who is 63 years-old,  short of breath  TECHNIQUE: Frontal and lateral views of the chest  COMPARISON: 03/16/2017  FINDINGS: Heart, mediastinum and pulmonary vasculature are unremarkable. No focal pulmonary consolidation, pleural effusion, or pneumothorax. No acute osseous process.      No evidence for acute pulmonary process. Follow-up as clinical indications persist.  This report was finalized on 5/8/2019 4:06 PM by Dr. Ernesto Carver M.D.      Ct Soft Tissue Neck With Contrast    Result Date: 5/8/2019  CT NECK SOFT TISSUES WITH IV CONTRAST, CT ANGIOGRAM CHEST WITH IV CONTRAST  HISTORY: 63-year-old male with cough and shortness of air that began in January. Sore throat with pain with swallowing and nasal congestion. Left neck swelling.  TECHNIQUE: Neck CT includes axial imaging with intravenous contrast. Site of swelling was marked with a BB. CT angiogram chest includes axial imaging from the thoracic inlet to the upper abdomen with IV contrast and this data was reconstructed in coronal and sagittal planes and 3-dimensional volume rendering was performed.  COMPARISON: There are no previous neck or chest CTs for comparison.  FINDINGS  NECK: A BB was placed at the site of clinical interest and this BB is on the skin surface within the left lateral submandibular region. Underlying this marker there is normal-appearing subcutaneous fat. No soft tissue mass is evident in this region. There is no evidence for submandibular/submental kajal enlargement. The parotid glands, submandibular glands, thyroid gland appear within normal limits. There is mild thickening of the oropharyngeal lymphoid tissue in Waldeyer's ring. There is no evidence for abscess or fluid collection. Carotid vascular  calcifications are present of the carotid bulbs and ICA origins. There is moderate frontal sinus mucosal thickening. Severe ethmoid and moderate right and mild-to-moderate left maxillary sinus mucosal thickening is present. There is also mucosal thickening involving the left sphenoid sinus. Partial opacification is present in bilateral mastoid air cells. No kajal enlargement is evident. There is degenerative disc disease in the cervical spine with disc space narrowing best demonstrated at C5-6 and C6-7.  CT ANGIOGRAM CHEST: Evaluation for embolus in the distal segmental and subsegmental branch is very limited due to streak artifact as this patient was scanned with his arms to his side and due to patient's body type. No central embolus is evident. Heart size is enlarged. Within the right lower lobe there are 2 adjacent nodules each measuring 5 mm on image 102. This may be inflammatory or infectious though recommend follow-up evaluation. There is also an 8 mm nodule within the left upper lobe on image 83. Imaging through the upper abdomen demonstrates diffuse fat infiltration of the liver. There is gynecomastia.      1. Limited evaluation for pulmonary emboli in the distal segmental and subsegmental branches. No central embolus is evident. Cardiomegaly. 2. Two right lower lobe 5 mm nodules and an inferior left upper lobe 8 mm nodule for which follow-up evaluation with chest CT is recommended in 4-6 months. 3. At the site of palpable interest in  the left neck there is no evidence for underlying soft tissue mass or kajal enlargement. There is mild circumferential thickening of the oropharyngeal lymphoid tissue without abscess or fluid collection. 4. Carotid atherosclerotic calcifications. 5. Degenerative disc disease in the cervical spine. 6. Inflammatory paranasal sinus disease. Partial opacification bilateral mastoid air cells.  Discussed with Dr. Garnett in the emergency department 05/08/2019 at 5:35 PM  Radiation  dose reduction techniques were utilized, including automated exposure control and exposure modulation based on body size.  This report was finalized on 5/8/2019 6:48 PM by Dr. Rojas Andres M.D.      Ct Angiogram Chest With Contrast    Result Date: 5/8/2019  CT NECK SOFT TISSUES WITH IV CONTRAST, CT ANGIOGRAM CHEST WITH IV CONTRAST  HISTORY: 63-year-old male with cough and shortness of air that began in January. Sore throat with pain with swallowing and nasal congestion. Left neck swelling.  TECHNIQUE: Neck CT includes axial imaging with intravenous contrast. Site of swelling was marked with a BB. CT angiogram chest includes axial imaging from the thoracic inlet to the upper abdomen with IV contrast and this data was reconstructed in coronal and sagittal planes and 3-dimensional volume rendering was performed.  COMPARISON: There are no previous neck or chest CTs for comparison.  FINDINGS  NECK: A BB was placed at the site of clinical interest and this BB is on the skin surface within the left lateral submandibular region. Underlying this marker there is normal-appearing subcutaneous fat. No soft tissue mass is evident in this region. There is no evidence for submandibular/submental kajal enlargement. The parotid glands, submandibular glands, thyroid gland appear within normal limits. There is mild thickening of the oropharyngeal lymphoid tissue in Waldeyer's ring. There is no evidence for abscess or fluid collection. Carotid vascular calcifications are present of the carotid bulbs and ICA origins. There is moderate frontal sinus mucosal thickening. Severe ethmoid and moderate right and mild-to-moderate left maxillary sinus mucosal thickening is present. There is also mucosal thickening involving the left sphenoid sinus. Partial opacification is present in bilateral mastoid air cells. No kajal enlargement is evident. There is degenerative disc disease in the cervical spine with disc space narrowing best  demonstrated at C5-6 and C6-7.  CT ANGIOGRAM CHEST: Evaluation for embolus in the distal segmental and subsegmental branch is very limited due to streak artifact as this patient was scanned with his arms to his side and due to patient's body type. No central embolus is evident. Heart size is enlarged. Within the right lower lobe there are 2 adjacent nodules each measuring 5 mm on image 102. This may be inflammatory or infectious though recommend follow-up evaluation. There is also an 8 mm nodule within the left upper lobe on image 83. Imaging through the upper abdomen demonstrates diffuse fat infiltration of the liver. There is gynecomastia.      1. Limited evaluation for pulmonary emboli in the distal segmental and subsegmental branches. No central embolus is evident. Cardiomegaly. 2. Two right lower lobe 5 mm nodules and an inferior left upper lobe 8 mm nodule for which follow-up evaluation with chest CT is recommended in 4-6 months. 3. At the site of palpable interest in  the left neck there is no evidence for underlying soft tissue mass or kajal enlargement. There is mild circumferential thickening of the oropharyngeal lymphoid tissue without abscess or fluid collection. 4. Carotid atherosclerotic calcifications. 5. Degenerative disc disease in the cervical spine. 6. Inflammatory paranasal sinus disease. Partial opacification bilateral mastoid air cells.  Discussed with Dr. Garnett in the emergency department 05/08/2019 at 5:35 PM  Radiation dose reduction techniques were utilized, including automated exposure control and exposure modulation based on body size.  This report was finalized on 5/8/2019 6:48 PM by Dr. Rojas Andres M.D.      Results for orders placed during the hospital encounter of 05/08/19   Adult Transthoracic Echo Complete W/ Cont if Necessary Per Protocol    Narrative · Aortic valve dimensionless index is 0.4.  · Estimated EF = 65%.  · Left ventricular systolic function is normal.  · There  is moderate calcification of the aortic valve.  · Moderate to severe aortic valve stenosis is present.  · Mild mitral valve regurgitation is present  · There is a small (<1cm) circumferential pericardial effusion.              Active Hospital Problems    Diagnosis  POA   • Acute sinusitis [J01.90]  Unknown   • CKD (chronic kidney disease) stage 3, GFR 30-59 ml/min (CMS/HCC) [N18.3]  Unknown   • History of atrial fibrillation [Z86.79]  Not Applicable   • Aortic stenosis [I35.0]  Yes   • Liver transplant recipient (CMS/HCC) [Z94.4]  Not Applicable   • Immunosuppressed status (CMS/HCC) [D89.9]  Yes   • Acute congestive heart failure (CMS/HCC) [I50.9]  Yes   • HTN (hypertension) [I10]  Yes      Resolved Hospital Problems   No resolved problems to display.         Assessment/Plan     1. Chronic cough patient reports it is significantly improved over the last couple of days he is actually been able to sleep and his voice is coming back.  Certainly sounds like it is related to postnasal drainage from his chronic sinusitis plus or minus an element of reflux  2. Chronic sinusitis with septal deviation and spur ENT recommends a full 21 days of antibiotic therapy and continued Flonase at discharge  3. Asthma  4. Pulmonary nodule 8 mm follow-up CT in 3 months follow-up with Dr. Garcia after CT scan.  5. Anemia hemoglobin relatively stable.  6. Hyponatremia sodium falling need to monitor  7. Chronic kidney disease creatinine is up slightly need to monitor this as well  8. Aortic stenosis moderate to severe  9. Gastroesophageal reflux disease    Looks like patient has made significant improvements again today he is gotten a couple of nights good sleep his cough is almost gone his voice is much improved.  Complete therapy and ENT follow-up.  Okay from a pulmonary standpoint to discharge at any time.  He does need a follow-up CT scan of the chest in a few months to follow-up with Dr. Garcia regarding this pulmonary nodule.    We  will see as needed  Plan for disposition:    Roldan Renteria MD  05/12/19  2:21 PM    Time:

## 2019-05-12 NOTE — PROGRESS NOTES
Milton JAMES Henry   63 y.o.  male    LOS: 4 days   Patient Care Team:  Ankit Mcmahon MD as PCP - General (Family Medicine)  Shari Cuellar MD as Consulting Physician (Cardiology)      Subjective   Feeling better now  Interval History:     Patient Complaints:     Review of Systems:       Medication Review:   Current Facility-Administered Medications:   •  albuterol sulfate HFA (PROVENTIL HFA;VENTOLIN HFA;PROAIR HFA) inhaler 2 puff, 2 puff, Inhalation, Q4H PRN, Kartik Arriaga MD, 2 puff at 05/12/19 0351  •  aspirin chewable tablet 81 mg, 81 mg, Oral, Daily, Kartik Arriaga MD, 81 mg at 05/12/19 0806  •  atorvastatin (LIPITOR) tablet 10 mg, 10 mg, Oral, Nightly, Kartik Arriaga MD, 10 mg at 05/11/19 2050  •  budesonide-formoterol (SYMBICORT) 160-4.5 MCG/ACT inhaler 2 puff, 2 puff, Inhalation, BID - RT, Kartik Arriaga MD, 2 puff at 05/12/19 0829  •  diltiaZEM CD (CARDIZEM CD) 24 hr capsule 120 mg, 120 mg, Oral, Q24H, Kartik Arriaga MD, 120 mg at 05/12/19 0806  •  enoxaparin (LOVENOX) syringe 40 mg, 40 mg, Subcutaneous, Q24H, Kartik Arriaga MD, 40 mg at 05/11/19 1157  •  furosemide (LASIX) tablet 40 mg, 40 mg, Oral, Daily, Dawood Santamaria MD, 40 mg at 05/12/19 0806  •  guaiFENesin (MUCINEX) 12 hr tablet 600 mg, 600 mg, Oral, Q12H, Kartik Arriaga MD, 600 mg at 05/12/19 0806  •  hydrALAZINE (APRESOLINE) tablet 75 mg, 75 mg, Oral, TID, Kartik Arriaga MD, 75 mg at 05/12/19 0806  •  HYDROcod Polst-CPM Polst ER (TUSSIONEX PENNKINETIC) 10-8 MG/5ML ER suspension 5 mL, 5 mL, Oral, BID PRN, Kartik Arriaga MD, 5 mL at 05/11/19 2112  •  HYDROmorphone (DILAUDID) injection 0.5 mg, 0.5 mg, Intravenous, Q4H PRN, Kartik Arriaga MD, 0.5 mg at 05/12/19 0806  •  ipratropium-albuterol (DUO-NEB) nebulizer solution 3 mL, 3 mL, Nebulization, Q4H PRN, Kartik Arriaga MD  •  levoFLOXacin (LEVAQUIN) tablet 750 mg, 750 mg, Oral, Q24H, Kartik Arriaga MD  •  levothyroxine (SYNTHROID, LEVOTHROID) tablet 25 mcg, 25 mcg, Oral, Q AM, Kartik Arriaga MD, 25 mcg at  05/12/19 0630  •  montelukast (SINGULAIR) tablet 10 mg, 10 mg, Oral, Nightly, Kartik Arriaga MD, 10 mg at 05/11/19 2050  •  ondansetron (ZOFRAN) injection 4 mg, 4 mg, Intravenous, Q6H PRN, Kartik Arriaga MD  •  oxymetazoline (AFRIN) nasal spray 2 spray, 2 spray, Each Nare, BID, Kartik Arriaga MD, 2 spray at 05/12/19 0807  •  pantoprazole (PROTONIX) EC tablet 40 mg, 40 mg, Oral, BID AC, Kartik Arriaga MD, 40 mg at 05/12/19 0712  •  phenol (CHLORASEPTIC) 1.4 % liquid 2 spray, 2 spray, Mouth/Throat, Q2H PRN, Kartik Arriaga MD  •  sirolimus (RAPAMUNE) tablet 3 mg, 3 mg, Oral, Daily, Kartik Arriaga MD, 3 mg at 05/12/19 0807  •  sodium chloride (OCEAN) nasal spray 2 spray, 2 spray, Each Nare, PRN, Sebas Garcia MD, 2 spray at 05/11/19 0810  •  [COMPLETED] Insert peripheral IV, , , Once **AND** sodium chloride 0.9 % flush 10 mL, 10 mL, Intravenous, PRN, Mike Garnett MD, 10 mL at 05/11/19 2051  •  spironolactone (ALDACTONE) tablet 100 mg, 100 mg, Oral, Daily, Kartik Arriaga MD, 100 mg at 05/12/19 0806      Objective   Vital Sign Min/Max for last 24 hours  Temp  Min: 97.7 °F (36.5 °C)  Max: 98.2 °F (36.8 °C)   BP  Min: 138/73  Max: 150/84    Pulse  Min: 82  Max: 106     Wt Readings from Last 3 Encounters:   05/12/19 94.6 kg (208 lb 8 oz)   05/11/17 99.8 kg (220 lb)   05/09/17 99.8 kg (220 lb)        Intake/Output Summary (Last 24 hours) at 5/12/2019 1109  Last data filed at 5/12/2019 0842  Gross per 24 hour   Intake 480 ml   Output --   Net 480 ml     Physical Exam:      General Appearance:    Well developed and well nourished in no acute distress   Head:    Normocephalic, atraumatic   Eyes:            Conjunctivae normal, non icteric, no xanthelasma   Neck:   supple, trachea midline, no thyromegaly, no carotid bruit, no jvd, no elevated cvp   Lungs:     Clear to auscultation,respirations regular, even and                  unlabored    Heart:    Regular rhythm and normal rate, normal S1 and S2,            No murmur, no  gallop, no rub, no click   Chest Wall:    No abnormalities observed   Abdomen:     Normal bowel sounds, no masses, no organomegaly, soft        non-tender, non-distended, no guarding, no rebound                tenderness   Rectal:     Deferred   Extremities:   No edema. Moves all extremities well, no cyanosis, no erythema   Pulses:   Pulses palpable and equal bilaterally   Skin:   No bleeding, bruising or rash   Neurologic:   awake alert and oriented x3, speech clear and approp, no facial drooping     :    Monitor:      Results Review:         Sodium Sodium   Date Value Ref Range Status   05/12/2019 134 (L) 136 - 145 mmol/L Final   05/11/2019 129 (L) 136 - 145 mmol/L Final   05/10/2019 132 (L) 136 - 145 mmol/L Final      Potassium Potassium   Date Value Ref Range Status   05/12/2019 4.4 3.5 - 5.2 mmol/L Final   05/11/2019 4.2 3.5 - 5.2 mmol/L Final   05/10/2019 4.5 3.5 - 5.2 mmol/L Final      Chloride Chloride   Date Value Ref Range Status   05/12/2019 97 (L) 98 - 107 mmol/L Final   05/11/2019 92 (L) 98 - 107 mmol/L Final   05/10/2019 96 (L) 98 - 107 mmol/L Final      Bicarbonate No results found for: PLASMABICARB   BUN BUN   Date Value Ref Range Status   05/12/2019 28 (H) 8 - 23 mg/dL Final   05/11/2019 33 (H) 8 - 23 mg/dL Final   05/10/2019 27 (H) 8 - 23 mg/dL Final      Creatinine Creatinine   Date Value Ref Range Status   05/12/2019 1.44 (H) 0.76 - 1.27 mg/dL Final   05/11/2019 1.64 (H) 0.76 - 1.27 mg/dL Final   05/10/2019 1.40 (H) 0.76 - 1.27 mg/dL Final      Calcium Calcium   Date Value Ref Range Status   05/12/2019 9.1 8.6 - 10.5 mg/dL Final   05/11/2019 9.6 8.6 - 10.5 mg/dL Final   05/10/2019 9.3 8.6 - 10.5 mg/dL Final      Magnesium No results found for: MG     Results from last 7 days   Lab Units 05/11/19  0510   WBC 10*3/mm3 6.30   HEMOGLOBIN g/dL 9.4*   HEMATOCRIT % 28.5*   PLATELETS 10*3/mm3 440     Lab Results   Lab Value Date/Time    TROPONINT <0.010 05/08/2019 1549     Lab Results   Component Value  Date    CHOL 117 05/10/2019     Lab Results   Component Value Date    HDL 56 05/10/2019     Lab Results   Component Value Date    LDL 37 05/10/2019     Lab Results   Component Value Date    TRIG 121 05/10/2019     No components found for: CHOLHDL  No results found for: PTT  No components found for: PT/INR  Lab Results   Component Value Date    HGBA1C 5.50 05/10/2019      Lab Results   Component Value Date    TSH 2.690 05/10/2019        Echo EF Estimated  Lab Results   Component Value Date    ECHOEFEST 65 05/09/2019         Assessment/ Plan    Active Hospital Problems    Diagnosis POA   • Acute sinusitis [J01.90] Unknown   • CKD (chronic kidney disease) stage 3, GFR 30-59 ml/min (CMS/[facility]) [N18.3] Unknown   • History of atrial fibrillation [Z86.79] Not Applicable   • Aortic stenosis [I35.0] Yes   • Liver transplant recipient (CMS/[facility]) [Z94.4] Not Applicable   • Immunosuppressed status (CMS/[facility]) [D89.9] Yes   • Acute congestive heart failure (CMS/[facility]) [I50.9] Yes   • HTN (hypertension) [I10] Yes       Patient has moderate to severe aortic stenosis.  To continue antibiotics and Flonase per ENT  Will switch to p.o. Diuretics.  Eventually he needs right and left heart catheterization.  Okay to DC from cardiology point of view  Follow-up with the Dr. Cuellar.              Dawood Santamaria MD  05/12/19  11:09 AM

## 2019-05-12 NOTE — PLAN OF CARE
Problem: Patient Care Overview  Goal: Individualization and Mutuality  Outcome: Ongoing (interventions implemented as appropriate)   05/10/19 1854   Individualization   Patient Specific Interventions po abx, vitals, labs, pulmonary and cardiology consults, PFT, OT/PT consult, am labs, vitals, ENT consult     Goal: Discharge Needs Assessment  Outcome: Ongoing (interventions implemented as appropriate)      Problem: Cardiac: Heart Failure (Adult)  Goal: Signs and Symptoms of Listed Potential Problems Will be Absent, Minimized or Managed (Cardiac: Heart Failure)  Outcome: Ongoing (interventions implemented as appropriate)      Problem: Pain, Acute (Adult)  Goal: Identify Related Risk Factors and Signs and Symptoms  Outcome: Ongoing (interventions implemented as appropriate)    Goal: Acceptable Pain Control/Comfort Level  Outcome: Ongoing (interventions implemented as appropriate)      Problem: Skin Injury Risk (Adult)  Goal: Identify Related Risk Factors and Signs and Symptoms  Outcome: Ongoing (interventions implemented as appropriate)    Goal: Skin Health and Integrity  Outcome: Ongoing (interventions implemented as appropriate)

## 2019-05-12 NOTE — PROGRESS NOTES
"Daily progress note    Chief complaint  Doing better  No new complaint    History of present illness  63-year-old white male with history of asthma aortic stenosis gastroesophageal reflux disease hepatitis C hypothyroidism chronic atrial fibrillation low back pain hypertension and COPD presented to [facility] emergency with shortness of breath for last several months which is getting worse also have nonproductive cough sore throat with difficulty swallowing and congestion.  Patient denies any chest pain fever chills abdominal pain nausea vomiting diarrhea.  Patient evaluated in ER found to have decompensated CHF and acute sinusitis admit for management.     REVIEW OF SYSTEMS  Review of Systems   Constitutional: Negative for activity change, appetite change and fever.   HENT: Positive for congestion, sore throat and trouble swallowing.    Eyes: Negative.    Respiratory: Positive for shortness of breath. Negative for cough.    Cardiovascular: Negative for chest pain and leg swelling.   Gastrointestinal: Negative for abdominal pain, diarrhea and vomiting.   Endocrine: Negative.    Genitourinary: Negative for decreased urine volume and dysuria.   Musculoskeletal: Negative for neck pain.   Skin: Negative for rash and wound.   Allergic/Immunologic: Negative.    Neurological: Negative for weakness, numbness and headaches.   Hematological: Negative.    Psychiatric/Behavioral: Negative.    All other systems reviewed and are negative.     PHYSICAL EXAM  Blood pressure 150/84, pulse 82, temperature 98.2 °F (36.8 °C), temperature source Oral, resp. rate 18, height 172 cm (67.72\"), weight 94.6 kg (208 lb 8 oz), SpO2 92 %.    Constitutional: He is oriented to person, place, and time. No distress.   Head: Normocephalic and atraumatic.   Eyes: EOM are normal. Pupils are equal, round, and reactive to light.   Neck: Normal range of motion. Neck supple.   Cardiovascular: Regular rhythm. Tachycardia present.   Murmur " heard.  Pulmonary/Chest: Effort normal and breath sounds normal. No respiratory distress.   Abdominal: Soft. There is no tenderness. There is no rebound and no guarding.   Musculoskeletal: Normal range of motion. He exhibits edema (2+ BLE).   Neurological: He is alert and oriented to person, place, and time. He has normal sensation and normal strength.   Skin: Skin is warm and dry.   Psychiatric: Mood and affect normal.     LAB RESULTS  Lab Results (last 24 hours)     Procedure Component Value Units Date/Time    BNP [[id number]]  (Normal) Collected:  05/12/19 0832    Specimen:  Blood Updated:  05/12/19 0913     proBNP 696.9 pg/mL     Narrative:       Among patients with dyspnea, NT-proBNP is highly sensitive for the detection of acute congestive heart failure. In addition NT-proBNP of <300 pg/ml effectively rules out acute congestive heart failure with 99% negative predictive value.    Basic Metabolic Panel [[id number]]  (Abnormal) Collected:  05/12/19 0832    Specimen:  Blood Updated:  05/12/19 0911     Glucose 173 mg/dL      BUN 28 mg/dL      Creatinine 1.44 mg/dL      Sodium 134 mmol/L      Potassium 4.4 mmol/L      Chloride 97 mmol/L      CO2 24.3 mmol/L      Calcium 9.1 mg/dL      eGFR Non African Amer 50 mL/min/1.73      BUN/Creatinine Ratio 19.4     Anion Gap 12.7 mmol/L     Narrative:       GFR Normal >60  Chronic Kidney Disease <60  Kidney Failure <15        Imaging Results (last 24 hours)     ** No results found for the last 24 hours. **        ECG 12 Lead   Order: [id number]   Status:  Final result   Visible to patient:  No (Not Released) Next appt:  None      Narrative     HEART RATE= 104  bpm  RR Interval= 580  ms  OH Interval= 165  ms  P Horizontal Axis= 9  deg  P Front Axis= 55  deg  QRSD Interval= 93  ms  QT Interval= 332  ms  QRS Axis= 10  deg  T Wave Axis= 72  deg  - ABNORMAL ECG -  Sinus tachycardia  POOR R WAVE PROGRESSION  NO SIGNIFICANT CHANGE FROM PREVIOUS ECG             Current  Facility-Administered Medications:   •  albuterol sulfate HFA (PROVENTIL HFA;VENTOLIN HFA;PROAIR HFA) inhaler 2 puff, 2 puff, Inhalation, Q4H PRN, Kartik Arriaga MD, 2 puff at 05/12/19 0351  •  aspirin chewable tablet 81 mg, 81 mg, Oral, Daily, Kartik Arriaga MD, 81 mg at 05/12/19 0806  •  atorvastatin (LIPITOR) tablet 10 mg, 10 mg, Oral, Nightly, Kartik Arriaga MD, 10 mg at 05/11/19 2050  •  budesonide-formoterol (SYMBICORT) 160-4.5 MCG/ACT inhaler 2 puff, 2 puff, Inhalation, BID - RT, Kartik Arriaga MD, 2 puff at 05/12/19 0829  •  diltiaZEM CD (CARDIZEM CD) 24 hr capsule 120 mg, 120 mg, Oral, Q24H, Kartik Arriaga MD, 120 mg at 05/12/19 0806  •  enoxaparin (LOVENOX) syringe 40 mg, 40 mg, Subcutaneous, Q24H, Kartik Arriaga MD, 40 mg at 05/12/19 1225  •  furosemide (LASIX) tablet 40 mg, 40 mg, Oral, Daily, Dawood Santamaria MD, 40 mg at 05/12/19 0806  •  guaiFENesin (MUCINEX) 12 hr tablet 600 mg, 600 mg, Oral, Q12H, Kartik Arrigaa MD, 600 mg at 05/12/19 0806  •  hydrALAZINE (APRESOLINE) tablet 75 mg, 75 mg, Oral, TID, Kartik Arriaga MD, 75 mg at 05/12/19 0806  •  HYDROcod Polst-CPM Polst ER (TUSSIONEX PENNKINETIC) 10-8 MG/5ML ER suspension 5 mL, 5 mL, Oral, BID PRN, Kartik Arriaga MD, 5 mL at 05/11/19 2112  •  HYDROmorphone (DILAUDID) injection 0.5 mg, 0.5 mg, Intravenous, Q4H PRN, Kartik Ariraga MD, 0.5 mg at 05/12/19 1226  •  ipratropium-albuterol (DUO-NEB) nebulizer solution 3 mL, 3 mL, Nebulization, Q4H PRN, Kartik Arriaga MD  •  levoFLOXacin (LEVAQUIN) tablet 750 mg, 750 mg, Oral, Q24H, Kartik Arriaga MD, 750 mg at 05/12/19 1226  •  levothyroxine (SYNTHROID, LEVOTHROID) tablet 25 mcg, 25 mcg, Oral, Q AM, Kartik Arriaga MD, 25 mcg at 05/12/19 0630  •  montelukast (SINGULAIR) tablet 10 mg, 10 mg, Oral, Nightly, Kartik Arriaga MD, 10 mg at 05/11/19 2050  •  ondansetron (ZOFRAN) injection 4 mg, 4 mg, Intravenous, Q6H PRN, Kartik Arriaga MD  •  oxymetazoline (AFRIN) nasal spray 2 spray, 2 spray, Each Nare, BID, Kartik Arriaga,  MD, 2 spray at 05/12/19 0807  •  pantoprazole (PROTONIX) EC tablet 40 mg, 40 mg, Oral, BID AC, Wolfgang Alicia MD, 40 mg at 05/12/19 0712  •  phenol (CHLORASEPTIC) 1.4 % liquid 2 spray, 2 spray, Mouth/Throat, Q2H PRN, Wolfgang Alicia MD  •  sirolimus (RAPAMUNE) tablet 3 mg, 3 mg, Oral, Daily, Wolfgang Alicia MD, 3 mg at 05/12/19 0807  •  sodium chloride (OCEAN) nasal spray 2 spray, 2 spray, Each Nare, PRN, Sebas Garcia MD, 2 spray at 05/11/19 0810  •  [COMPLETED] Insert peripheral IV, , , Once **AND** sodium chloride 0.9 % flush 10 mL, 10 mL, Intravenous, PRN, Mike Garnett MD, 10 mL at 05/11/19 2051  •  spironolactone (ALDACTONE) tablet 100 mg, 100 mg, Oral, Daily, Wolfgang Alicia MD, 100 mg at 05/12/19 0806     ASSESSMENT  Acute on chronic diastolic CHF  COPD/asthma  Lung nodules  Aortic stenosis  Hypertension  Hyperlipidemia  Hypothyroidism  Gastroesophageal reflux disease    PLAN  CPM  Supplemental oxygen nebulizer  Changed to p.o. Diuretics  Anemia work-up  Cardiology and pulmonary input appreciated  ENT input appreciated  Antibiotics for 21 days  Continue home medications  Stress ulcer DVT prophylaxis  Supportive care  Symptomatic treatment for cough congestion  PT/OT  Follow closely further recommendation according to hospital course    WOLFGANG ALICIA MD

## 2019-05-13 VITALS
TEMPERATURE: 97.7 F | WEIGHT: 208.8 LBS | HEART RATE: 86 BPM | BODY MASS INDEX: 31.64 KG/M2 | SYSTOLIC BLOOD PRESSURE: 161 MMHG | OXYGEN SATURATION: 98 % | DIASTOLIC BLOOD PRESSURE: 79 MMHG | RESPIRATION RATE: 18 BRPM | HEIGHT: 68 IN

## 2019-05-13 LAB
ANION GAP SERPL CALCULATED.3IONS-SCNC: 12.4 MMOL/L
BASOPHILS # BLD AUTO: 0.01 10*3/MM3 (ref 0–0.2)
BASOPHILS NFR BLD AUTO: 0.2 % (ref 0–1.5)
BUN BLD-MCNC: 29 MG/DL (ref 8–23)
BUN/CREAT SERPL: 19.7 (ref 7–25)
CALCIUM SPEC-SCNC: 9.1 MG/DL (ref 8.6–10.5)
CHLORIDE SERPL-SCNC: 97 MMOL/L (ref 98–107)
CO2 SERPL-SCNC: 26.6 MMOL/L (ref 22–29)
CREAT BLD-MCNC: 1.47 MG/DL (ref 0.76–1.27)
DEPRECATED RDW RBC AUTO: 39.9 FL (ref 37–54)
EOSINOPHIL # BLD AUTO: 0.11 10*3/MM3 (ref 0–0.4)
EOSINOPHIL NFR BLD AUTO: 1.9 % (ref 0.3–6.2)
ERYTHROCYTE [DISTWIDTH] IN BLOOD BY AUTOMATED COUNT: 12.4 % (ref 12.3–15.4)
GFR SERPL CREATININE-BSD FRML MDRD: 48 ML/MIN/1.73
GLUCOSE BLD-MCNC: 129 MG/DL (ref 65–99)
HCT VFR BLD AUTO: 28.7 % (ref 37.5–51)
HGB BLD-MCNC: 9.3 G/DL (ref 13–17.7)
IMM GRANULOCYTES # BLD AUTO: 0.07 10*3/MM3 (ref 0–0.05)
IMM GRANULOCYTES NFR BLD AUTO: 1.2 % (ref 0–0.5)
LYMPHOCYTES # BLD AUTO: 1.1 10*3/MM3 (ref 0.7–3.1)
LYMPHOCYTES NFR BLD AUTO: 19.4 % (ref 19.6–45.3)
MCH RBC QN AUTO: 28.4 PG (ref 26.6–33)
MCHC RBC AUTO-ENTMCNC: 32.4 G/DL (ref 31.5–35.7)
MCV RBC AUTO: 87.5 FL (ref 79–97)
MONOCYTES # BLD AUTO: 0.86 10*3/MM3 (ref 0.1–0.9)
MONOCYTES NFR BLD AUTO: 15.2 % (ref 5–12)
NEUTROPHILS # BLD AUTO: 3.52 10*3/MM3 (ref 1.7–7)
NEUTROPHILS NFR BLD AUTO: 62.1 % (ref 42.7–76)
NRBC BLD AUTO-RTO: 0 /100 WBC (ref 0–0.2)
PLATELET # BLD AUTO: 495 10*3/MM3 (ref 140–450)
PMV BLD AUTO: 9.1 FL (ref 6–12)
POTASSIUM BLD-SCNC: 4.9 MMOL/L (ref 3.5–5.2)
RBC # BLD AUTO: 3.28 10*6/MM3 (ref 4.14–5.8)
SODIUM BLD-SCNC: 136 MMOL/L (ref 136–145)
WBC NRBC COR # BLD: 5.67 10*3/MM3 (ref 3.4–10.8)

## 2019-05-13 PROCEDURE — 85025 COMPLETE CBC W/AUTO DIFF WBC: CPT | Performed by: HOSPITALIST

## 2019-05-13 PROCEDURE — 80048 BASIC METABOLIC PNL TOTAL CA: CPT | Performed by: HOSPITALIST

## 2019-05-13 PROCEDURE — 25010000002 HYDROMORPHONE PER 4 MG: Performed by: HOSPITALIST

## 2019-05-13 PROCEDURE — 63710000001 SIROLIMUS 0.5 MG TABLET: Performed by: HOSPITALIST

## 2019-05-13 PROCEDURE — 94799 UNLISTED PULMONARY SVC/PX: CPT

## 2019-05-13 RX ORDER — LANOLIN ALCOHOL/MO/W.PET/CERES
325 CREAM (GRAM) TOPICAL
Qty: 30 TABLET | Refills: 0 | Status: SHIPPED | OUTPATIENT
Start: 2019-05-14 | End: 2019-06-13

## 2019-05-13 RX ORDER — ASPIRIN 81 MG/1
81 TABLET, CHEWABLE ORAL DAILY
Qty: 30 TABLET | Refills: 0 | Status: SHIPPED | OUTPATIENT
Start: 2019-05-14 | End: 2019-06-13

## 2019-05-13 RX ORDER — OXYMETAZOLINE HYDROCHLORIDE 0.05 G/100ML
2 SPRAY NASAL 2 TIMES DAILY
Status: DISCONTINUED | OUTPATIENT
Start: 2019-05-13 | End: 2019-05-13 | Stop reason: HOSPADM

## 2019-05-13 RX ORDER — DILTIAZEM HYDROCHLORIDE 120 MG/1
120 CAPSULE, COATED, EXTENDED RELEASE ORAL
Qty: 30 CAPSULE | Refills: 0 | Status: SHIPPED | OUTPATIENT
Start: 2019-05-14 | End: 2019-06-13

## 2019-05-13 RX ORDER — OXYMETAZOLINE HYDROCHLORIDE 0.05 G/100ML
2 SPRAY NASAL 2 TIMES DAILY
Qty: 30 ML | Refills: 0 | Status: SHIPPED | OUTPATIENT
Start: 2019-05-13 | End: 2019-05-16

## 2019-05-13 RX ORDER — FERROUS SULFATE 325(65) MG
325 TABLET ORAL
Status: DISCONTINUED | OUTPATIENT
Start: 2019-05-14 | End: 2019-05-13 | Stop reason: HOSPADM

## 2019-05-13 RX ORDER — ATORVASTATIN CALCIUM 10 MG/1
10 TABLET, FILM COATED ORAL NIGHTLY
Qty: 30 TABLET | Refills: 0 | Status: SHIPPED | OUTPATIENT
Start: 2019-05-13 | End: 2019-06-12

## 2019-05-13 RX ORDER — HYDRALAZINE HYDROCHLORIDE 25 MG/1
75 TABLET, FILM COATED ORAL 3 TIMES DAILY
Qty: 270 TABLET | Refills: 0 | Status: SHIPPED | OUTPATIENT
Start: 2019-05-13 | End: 2019-06-12

## 2019-05-13 RX ORDER — LEVOFLOXACIN 750 MG/1
750 TABLET ORAL EVERY 24 HOURS
Qty: 19 TABLET | Refills: 0 | Status: SHIPPED | OUTPATIENT
Start: 2019-05-14 | End: 2019-06-02

## 2019-05-13 RX ORDER — GUAIFENESIN 600 MG/1
600 TABLET, EXTENDED RELEASE ORAL EVERY 12 HOURS SCHEDULED
Qty: 60 TABLET | Refills: 0 | Status: SHIPPED | OUTPATIENT
Start: 2019-05-13 | End: 2019-06-12

## 2019-05-13 RX ADMIN — LEVOFLOXACIN 750 MG: 750 TABLET, FILM COATED ORAL at 12:26

## 2019-05-13 RX ADMIN — PANTOPRAZOLE SODIUM 40 MG: 40 TABLET, DELAYED RELEASE ORAL at 08:08

## 2019-05-13 RX ADMIN — GUAIFENESIN 600 MG: 600 TABLET, EXTENDED RELEASE ORAL at 08:08

## 2019-05-13 RX ADMIN — SPIRONOLACTONE 100 MG: 100 TABLET ORAL at 08:09

## 2019-05-13 RX ADMIN — SIROLIMUS 3 MG: 0.5 TABLET, SUGAR COATED ORAL at 08:08

## 2019-05-13 RX ADMIN — OXYMETAZOLINE HYDROCHLORIDE 2 SPRAY: 5 SPRAY NASAL at 08:09

## 2019-05-13 RX ADMIN — ASPIRIN 81 MG: 81 TABLET, CHEWABLE ORAL at 08:08

## 2019-05-13 RX ADMIN — HYDROMORPHONE HYDROCHLORIDE 0.5 MG: 1 INJECTION, SOLUTION INTRAMUSCULAR; INTRAVENOUS; SUBCUTANEOUS at 12:27

## 2019-05-13 RX ADMIN — HYDRALAZINE HYDROCHLORIDE 75 MG: 50 TABLET, FILM COATED ORAL at 08:08

## 2019-05-13 RX ADMIN — HYDROMORPHONE HYDROCHLORIDE 0.5 MG: 1 INJECTION, SOLUTION INTRAMUSCULAR; INTRAVENOUS; SUBCUTANEOUS at 03:08

## 2019-05-13 RX ADMIN — DILTIAZEM HYDROCHLORIDE 120 MG: 120 CAPSULE, COATED, EXTENDED RELEASE ORAL at 08:08

## 2019-05-13 RX ADMIN — LEVOTHYROXINE SODIUM 25 MCG: 25 TABLET ORAL at 05:59

## 2019-05-13 RX ADMIN — FUROSEMIDE 40 MG: 40 TABLET ORAL at 08:08

## 2019-05-13 RX ADMIN — BUDESONIDE AND FORMOTEROL FUMARATE DIHYDRATE 2 PUFF: 160; 4.5 AEROSOL RESPIRATORY (INHALATION) at 07:47

## 2019-05-13 RX ADMIN — HYDROMORPHONE HYDROCHLORIDE 0.5 MG: 1 INJECTION, SOLUTION INTRAMUSCULAR; INTRAVENOUS; SUBCUTANEOUS at 08:08

## 2019-05-13 NOTE — PROGRESS NOTES
Continued Stay Note  [facility]     Patient Name: Milton Figueroa  MRN: [id number]  Today's Date: 5/13/2019    Admit Date: 5/8/2019    Discharge Plan     Row Name 05/13/19 1426       Plan    Plan  Plan home.  MELONIE Aviles RN    Patient/Family in Agreement with Plan  yes    Plan Comments  Spoke to pt at bedside.  Pt states he does not have scales.  Pt provided scales.   Plan home.    MELONIE Aviles RN        Discharge Codes    No documentation.       Expected Discharge Date and Time     Expected Discharge Date Expected Discharge Time    May 13, 2019             Luciana Aviles RN

## 2019-05-13 NOTE — DISCHARGE SUMMARY
Discharge summary    Date of admission 5/8/2019  Date of discharge 5/13/2019    Final diagnosis  Acute on chronic diastolic CHF  Acute on chronic sinusitis  COPD/asthma  Lung nodules  Aortic stenosis  Hypertension  Hyperlipidemia  Hypothyroidism  Gastroesophageal reflux disease    Discharge medications    Current Facility-Administered Medications:   •  aspirin chewable tablet 81 mg, 81 mg, Oral, Daily, Kartik Arriaga MD, 81 mg at 05/13/19 0808  •  atorvastatin (LIPITOR) tablet 10 mg, 10 mg, Oral, Nightly, Kartik Ariraga MD, 10 mg at 05/12/19 2140  •  budesonide-formoterol (SYMBICORT) 160-4.5 MCG/ACT inhaler 2 puff, 2 puff, Inhalation, BID - RT, Kartik Arriaga MD, 2 puff at 05/13/19 0747  •  diltiaZEM CD (CARDIZEM CD) 24 hr capsule 120 mg, 120 mg, Oral, Q24H, Kartik Arriaga MD, 120 mg at 05/13/19 0808  •  [START ON 5/14/2019] ferrous sulfate tablet 325 mg, 325 mg, Oral, Daily With Breakfast, Kartik Arriaga MD  •  furosemide (LASIX) tablet 40 mg, 40 mg, Oral, Daily, Dawood Santamaria MD, 40 mg at 05/13/19 0808  •  guaiFENesin (MUCINEX) 12 hr tablet 600 mg, 600 mg, Oral, Q12H, Kartik Arriaga MD, 600 mg at 05/13/19 0808  •  hydrALAZINE (APRESOLINE) tablet 75 mg, 75 mg, Oral, TID, Kartki Arriaga MD, 75 mg at 05/13/19 0808  •  levoFLOXacin (LEVAQUIN) tablet 750 mg, 750 mg, Oral, Q24H, Kartik Arriaga MD, 750 mg at 05/13/19 1226  •  levothyroxine (SYNTHROID, LEVOTHROID) tablet 25 mcg, 25 mcg, Oral, Q AM, Kartik Arriaga MD, 25 mcg at 05/13/19 0559  •  montelukast (SINGULAIR) tablet 10 mg, 10 mg, Oral, Nightly, Kartik Arriaga MD, 10 mg at 05/12/19 2140  •  oxymetazoline (AFRIN) nasal spray 2 spray, 2 spray, Each Nare, BID, Kartik Arriaga MD  •  pantoprazole (PROTONIX) EC tablet 40 mg, 40 mg, Oral, BID AC, Kartik Arriaga MD, 40 mg at 05/13/19 0808  •  sirolimus (RAPAMUNE) tablet 3 mg, 3 mg, Oral, Daily, Kartik Arriaga MD, 3 mg at 05/13/19 0808  •  [COMPLETED] Insert peripheral IV, , , Once **AND** sodium chloride 0.9 % flush 10 mL, 10  mL, Intravenous, PRN, Mike Garnett MD, 10 mL at 05/11/19 2051  •  spironolactone (ALDACTONE) tablet 100 mg, 100 mg, Oral, Daily, Wolfgang Arriaga MD, 100 mg at 05/13/19 0809     Consult obtained  Cardiology  Pulmonary  ENT  Nutrition    Procedures  None    Hospital course  63-year-old white male with history of hypertension valvular heart disease chronic atrial fibrillation low back pain and COPD admit to emergency room with shortness of breath.  Patient work-up revealed acute on chronic diastolic CHF and treated with IV diuretics and switched to p.o.  Patient also found to have acute on chronic sinusitis and further evaluated by ENT and they recommend 21 days of Levaquin.  Patient is chronically anemic and his work-up revealed anemia of chronic inflammation with iron deficiency started on oral iron.  Patient feeling better and will be discharged on oral antibiotics and oral diuretics.  Patient clear with pulmonary who was following for pulmonary nodules and they recommend repeat CT in 3 months.    Discharge diet regular    Activity as tolerated    Medication as above    Follow-up with primary doctor in 1 week and follow-up with cardiology pulmonary and ENT per the instruction and take medication as directed    WOLFGANG ARRIAGA MD

## 2019-05-13 NOTE — PLAN OF CARE
Problem: Patient Care Overview  Goal: Plan of Care Review  Outcome: Ongoing (interventions implemented as appropriate)   05/13/19 6288   Coping/Psychosocial   Plan of Care Reviewed With patient   Plan of Care Review   Progress improving   OTHER   Outcome Summary Patient still complaining of sore thoat, Diluadid q 4 hours given with good effect. Continues oral antibiotic with no side effect. Patient tolerating well. Up ad carlin. NSR to Sinus Tach on monitor. Nursing will continue to monitor.      Goal: Individualization and Mutuality  Outcome: Ongoing (interventions implemented as appropriate)    Goal: Discharge Needs Assessment  Outcome: Ongoing (interventions implemented as appropriate)    Goal: Interprofessional Rounds/Family Conf  Outcome: Ongoing (interventions implemented as appropriate)      Problem: Cardiac: Heart Failure (Adult)  Goal: Signs and Symptoms of Listed Potential Problems Will be Absent, Minimized or Managed (Cardiac: Heart Failure)  Outcome: Ongoing (interventions implemented as appropriate)      Problem: Pain, Acute (Adult)  Goal: Identify Related Risk Factors and Signs and Symptoms  Outcome: Outcome(s) achieved Date Met: 05/13/19    Goal: Acceptable Pain Control/Comfort Level  Outcome: Ongoing (interventions implemented as appropriate)      Problem: Skin Injury Risk (Adult)  Goal: Identify Related Risk Factors and Signs and Symptoms  Outcome: Outcome(s) achieved Date Met: 05/13/19    Goal: Skin Health and Integrity  Outcome: Ongoing (interventions implemented as appropriate)

## 2019-05-13 NOTE — PROGRESS NOTES
Continued Stay Note  [facility]     Patient Name: Milton Figueroa  MRN: [id number]  Today's Date: 5/13/2019    Admit Date: 5/8/2019    Discharge Plan     Row Name 05/13/19 2062       Plan    Plan  Plan home.  MELONIE Aviles RN    Patient/Family in Agreement with Plan  yes    Plan Comments  Spoke to pt at bedside.  Pt has no way to pay for his Meds to Bed.  Discussed with [facility] to cover cost of medications.  Meds to deliver to pt.   Plan home.  MELONIE Aviles RN    Row Name 05/13/19 0192       Plan    Plan  Plan home.  MELONIE Aviles RN    Patient/Family in Agreement with Plan  yes    Plan Comments  Spoke to pt at bedside.  Pt states he does not have scales.  Pt provided scales.   Plan home.    MELONIE Aviles RN        Discharge Codes    No documentation.       Expected Discharge Date and Time     Expected Discharge Date Expected Discharge Time    May 13, 2019             Luciana Aviles RN

## 2019-05-13 NOTE — PROGRESS NOTES
Continued Stay Note  [facility]     Patient Name: Milotn Figueroa  MRN: [id number]  Today's Date: 5/13/2019    Admit Date: 5/8/2019    Discharge Plan     Row Name 05/13/19 1030       Plan    Plan  Plan home.  MELONIE Aviles RN    Patient/Family in Agreement with Plan  yes    Plan Comments  Spoke to pt at bedside.  Pt denies any discharge needs.  Plan home.  MELONIE Aviles RN        Discharge Codes    No documentation.             Luciana Aviles RN

## 2019-05-13 NOTE — PROGRESS NOTES
Milton Figueroa was counseled over congestive heart failure  prior to discharge.  Counseling points included but were limited to:    1. Weigh yourself every morning after emptying your bladder and compare your weight to the day before  2. Keep the total amount of fluids you drink to only 6-8 glasses each day (48-64 ounces).  3. Take your medicine exactly as prescribed  4. Check for swelling in your feet, ankles, legs, and stomach  5. Eat foods that are low in salt or salt-free.  Limit your sodium intake to <2000mg/day  6. Balance activity and rest periods  7. Do not smoke, and limit alcohol intake (No more than 2 drinks a day for men and 1 drink a day for women)    He was also instructed to contact his primary care doctor immediately if he notices any of the following signs:    1. Weight gain of >2 pounds in one day or 5 pounds in 1 week  2. Vomiting and/or diarrhea that lasts more than 2 days  3. Feeling more shortness of breath than usual  4. Increased swelling in your feet, ankles, legs, or stomach  5. Development of a dry, hacking cough OR a productive cough with frothy sputum  6. Feeling more tired and having less energy to complete daily tasks  7. Feeling light-headed or dizzy  8. Having difficulty breathing when lying down flat     The patient is on the following medications for his congestive heart failure and each medication was reviewed by the pharmacist prior to discharge:    Furosemide (Diuretic)  Diltiazem (ACE-Inhibitor/ARB)  Spironolactone (Aldosterone Antagonist)  Hydralazine (Hydral-Nitrates)    Also counseled patient on importance of inhalers, difference in rescue inhaler and scheduled inhaler use, and how to use with spacer (albuterol inhaler and Symbicort)    The patient was provided with educational materials of all the counseling points we reviewed to keep as a reference.    All other questions from the patient were answered at this time.      Valerie Rodriguez, PharmD, BCACP

## 2019-05-13 NOTE — PROGRESS NOTES
Case Management Discharge Note    Final Note: Pt discharged home.  MELONIE Aviles RN    Destination      No service has been selected for the patient.      Durable Medical Equipment      No service has been selected for the patient.      Dialysis/Infusion      No service has been selected for the patient.      Home Medical Care      No service has been selected for the patient.      Therapy      No service has been selected for the patient.      Community Resources      No service has been selected for the patient.        Transportation Services  Other: Other(Private Auto)    Final Discharge Disposition Code: 01 - home or self-care

## 2019-05-13 NOTE — PROGRESS NOTES
"Daily progress note    Chief complaint  Doing better  No new complaint  Wants to go home    History of present illness  63-year-old white male with history of asthma aortic stenosis gastroesophageal reflux disease hepatitis C hypothyroidism chronic atrial fibrillation low back pain hypertension and COPD presented to [facility] emergency with shortness of breath for last several months which is getting worse also have nonproductive cough sore throat with difficulty swallowing and congestion.  Patient denies any chest pain fever chills abdominal pain nausea vomiting diarrhea.  Patient evaluated in ER found to have decompensated CHF and acute sinusitis admit for management.     REVIEW OF SYSTEMS  Review of Systems   Constitutional: Negative for activity change, appetite change and fever.   HENT: Positive for congestion, sore throat and trouble swallowing.    Eyes: Negative.    Respiratory: Positive for shortness of breath. Negative for cough.    Cardiovascular: Negative for chest pain and leg swelling.   Gastrointestinal: Negative for abdominal pain, diarrhea and vomiting.   Endocrine: Negative.    Genitourinary: Negative for decreased urine volume and dysuria.   Musculoskeletal: Negative for neck pain.   Skin: Negative for rash and wound.   Allergic/Immunologic: Negative.    Neurological: Negative for weakness, numbness and headaches.   Hematological: Negative.    Psychiatric/Behavioral: Negative.    All other systems reviewed and are negative.     PHYSICAL EXAM  Blood pressure 161/79, pulse 86, temperature 97.7 °F (36.5 °C), temperature source Oral, resp. rate 18, height 172 cm (67.72\"), weight 94.7 kg (208 lb 12.8 oz), SpO2 98 %.    Constitutional: He is oriented to person, place, and time. No distress.   Head: Normocephalic and atraumatic.   Eyes: EOM are normal. Pupils are equal, round, and reactive to light.   Neck: Normal range of motion. Neck supple.   Cardiovascular: Regular rhythm. Tachycardia " present.   Murmur heard.  Pulmonary/Chest: Effort normal and breath sounds normal. No respiratory distress.   Abdominal: Soft. There is no tenderness. There is no rebound and no guarding.   Musculoskeletal: Normal range of motion. He exhibits edema (2+ BLE).   Neurological: He is alert and oriented to person, place, and time. He has normal sensation and normal strength.   Skin: Skin is warm and dry.   Psychiatric: Mood and affect normal.     LAB RESULTS  Lab Results (last 24 hours)     Procedure Component Value Units Date/Time    Basic Metabolic Panel [[id number]]  (Abnormal) Collected:  05/13/19 0646    Specimen:  Blood Updated:  05/13/19 0753     Glucose 129 mg/dL      BUN 29 mg/dL      Creatinine 1.47 mg/dL      Sodium 136 mmol/L      Potassium 4.9 mmol/L      Chloride 97 mmol/L      CO2 26.6 mmol/L      Calcium 9.1 mg/dL      eGFR Non African Amer 48 mL/min/1.73      BUN/Creatinine Ratio 19.7     Anion Gap 12.4 mmol/L     Narrative:       GFR Normal >60  Chronic Kidney Disease <60  Kidney Failure <15    CBC & Differential [[id number]] Collected:  05/13/19 0646    Specimen:  Blood Updated:  05/13/19 0728    Narrative:       The following orders were created for panel order CBC & Differential.  Procedure                               Abnormality         Status                     ---------                               -----------         ------                     CBC Auto Differential[[id number]]        Abnormal            Final result                 Please view results for these tests on the individual orders.    CBC Auto Differential [[id number]]  (Abnormal) Collected:  05/13/19 0646    Specimen:  Blood Updated:  05/13/19 0728     WBC 5.67 10*3/mm3      RBC 3.28 10*6/mm3      Hemoglobin 9.3 g/dL      Hematocrit 28.7 %      MCV 87.5 fL      MCH 28.4 pg      MCHC 32.4 g/dL      RDW 12.4 %      RDW-SD 39.9 fl      MPV 9.1 fL      Platelets 495 10*3/mm3      Neutrophil % 62.1 %      Lymphocyte % 19.4 %      Monocyte  % 15.2 %      Eosinophil % 1.9 %      Basophil % 0.2 %      Immature Grans % 1.2 %      Neutrophils, Absolute 3.52 10*3/mm3      Lymphocytes, Absolute 1.10 10*3/mm3      Monocytes, Absolute 0.86 10*3/mm3      Eosinophils, Absolute 0.11 10*3/mm3      Basophils, Absolute 0.01 10*3/mm3      Immature Grans, Absolute 0.07 10*3/mm3      nRBC 0.0 /100 WBC     Occult Blood X 1, Stool - Stool, Per Rectum [[id number]]  (Normal) Collected:  05/12/19 1705    Specimen:  Stool from Per Rectum Updated:  05/12/19 1725     Fecal Occult Blood Negative    Vitamin B12 [[id number]]  (Abnormal) Collected:  05/12/19 1456    Specimen:  Blood Updated:  05/12/19 1556     Vitamin B-12 1,363 pg/mL     Folate [[id number]]  (Normal) Collected:  05/12/19 1456    Specimen:  Blood Updated:  05/12/19 1556     Folate 14.00 ng/mL     Ferritin [[id number]]  (Normal) Collected:  05/12/19 1456    Specimen:  Blood Updated:  05/12/19 1548     Ferritin 102.00 ng/mL     Iron Profile [[id number]]  (Abnormal) Collected:  05/12/19 1456    Specimen:  Blood Updated:  05/12/19 1542     Iron 44 mcg/dL      Iron Saturation 14 %      Transferrin 209 mg/dL      TIBC 311 mcg/dL     Lactate Dehydrogenase [[id number]]  (Normal) Collected:  05/12/19 1456    Specimen:  Blood Updated:  05/12/19 1542      U/L     Reticulocytes [[id number]]  (Normal) Collected:  05/12/19 1456    Specimen:  Blood Updated:  05/12/19 1526     Reticulocyte % 1.01 %      Reticulocyte Absolute 0.0334 10*6/mm3         Imaging Results (last 24 hours)     ** No results found for the last 24 hours. **        ECG 12 Lead   Order: [id number]   Status:  Final result   Visible to patient:  No (Not Released) Next appt:  None      Narrative     HEART RATE= 104  bpm  RR Interval= 580  ms  MA Interval= 165  ms  P Horizontal Axis= 9  deg  P Front Axis= 55  deg  QRSD Interval= 93  ms  QT Interval= 332  ms  QRS Axis= 10  deg  T Wave Axis= 72  deg  - ABNORMAL ECG -  Sinus tachycardia  POOR R WAVE PROGRESSION  NO  SIGNIFICANT CHANGE FROM PREVIOUS ECG             Current Facility-Administered Medications:   •  albuterol sulfate HFA (PROVENTIL HFA;VENTOLIN HFA;PROAIR HFA) inhaler 2 puff, 2 puff, Inhalation, Q4H PRN, Kartik Arriaga MD, 2 puff at 05/12/19 0351  •  aspirin chewable tablet 81 mg, 81 mg, Oral, Daily, Kartik Arriaga MD, 81 mg at 05/13/19 0808  •  atorvastatin (LIPITOR) tablet 10 mg, 10 mg, Oral, Nightly, Kartik Arriaga MD, 10 mg at 05/12/19 2140  •  budesonide-formoterol (SYMBICORT) 160-4.5 MCG/ACT inhaler 2 puff, 2 puff, Inhalation, BID - RT, Kartik Arriaga MD, 2 puff at 05/13/19 0747  •  diltiaZEM CD (CARDIZEM CD) 24 hr capsule 120 mg, 120 mg, Oral, Q24H, Kartik Arriaga MD, 120 mg at 05/13/19 0808  •  enoxaparin (LOVENOX) syringe 40 mg, 40 mg, Subcutaneous, Q24H, Kartik Arriaga MD, 40 mg at 05/12/19 1225  •  furosemide (LASIX) tablet 40 mg, 40 mg, Oral, Daily, Dawood Santamaria MD, 40 mg at 05/13/19 0808  •  guaiFENesin (MUCINEX) 12 hr tablet 600 mg, 600 mg, Oral, Q12H, Kartik Arriaga MD, 600 mg at 05/13/19 0808  •  hydrALAZINE (APRESOLINE) tablet 75 mg, 75 mg, Oral, TID, Kartik Arriaga MD, 75 mg at 05/13/19 0808  •  HYDROcod Polst-CPM Polst ER (TUSSIONEX PENNKINETIC) 10-8 MG/5ML ER suspension 5 mL, 5 mL, Oral, BID PRN, Kartik Arriaga MD, 5 mL at 05/11/19 2112  •  HYDROmorphone (DILAUDID) injection 0.5 mg, 0.5 mg, Intravenous, Q4H PRN, Kratik Arriaga MD, 0.5 mg at 05/13/19 1227  •  ipratropium-albuterol (DUO-NEB) nebulizer solution 3 mL, 3 mL, Nebulization, Q4H PRN, Kartik Arriaga MD  •  levoFLOXacin (LEVAQUIN) tablet 750 mg, 750 mg, Oral, Q24H, Kartik Arriaga MD, 750 mg at 05/13/19 1226  •  levothyroxine (SYNTHROID, LEVOTHROID) tablet 25 mcg, 25 mcg, Oral, Q AM, Kartik Arriaga MD, 25 mcg at 05/13/19 0559  •  montelukast (SINGULAIR) tablet 10 mg, 10 mg, Oral, Nightly, Kartik Arriaga MD, 10 mg at 05/12/19 2140  •  ondansetron (ZOFRAN) injection 4 mg, 4 mg, Intravenous, Q6H PRN, Kartik Arriaga MD  •  oxymetazoline (AFRIN) nasal  spray 2 spray, 2 spray, Each Nare, BID, Wolfgang Alicia MD, 2 spray at 05/13/19 0809  •  pantoprazole (PROTONIX) EC tablet 40 mg, 40 mg, Oral, BID AC, Wolfgang Alicia MD, 40 mg at 05/13/19 0808  •  phenol (CHLORASEPTIC) 1.4 % liquid 2 spray, 2 spray, Mouth/Throat, Q2H PRN, Wolfgang Alicia MD  •  sirolimus (RAPAMUNE) tablet 3 mg, 3 mg, Oral, Daily, Wolfgang Alicia MD, 3 mg at 05/13/19 0808  •  sodium chloride (OCEAN) nasal spray 2 spray, 2 spray, Each Nare, PRN, Sebas Garcia MD, 2 spray at 05/11/19 0810  •  [COMPLETED] Insert peripheral IV, , , Once **AND** sodium chloride 0.9 % flush 10 mL, 10 mL, Intravenous, PRN, Mike Garnett MD, 10 mL at 05/11/19 2051  •  spironolactone (ALDACTONE) tablet 100 mg, 100 mg, Oral, Daily, Wolfgang Alicia MD, 100 mg at 05/13/19 0809     ASSESSMENT  Acute on chronic diastolic CHF  Acute on chronic sinusitis  COPD/asthma  Lung nodules  Aortic stenosis  Hypertension  Hyperlipidemia  Hypothyroidism  Gastroesophageal reflux disease    PLAN  Discharge home today on oral antibiotics for total of 21 days  Discharge summary dictated    WOLFGANG ALICIA MD

## 2019-05-14 ENCOUNTER — NURSE TRIAGE (OUTPATIENT)
Dept: CALL CENTER | Facility: HOSPITAL | Age: 64
End: 2019-05-14

## 2019-05-14 ENCOUNTER — READMISSION MANAGEMENT (OUTPATIENT)
Dept: CALL CENTER | Facility: HOSPITAL | Age: 64
End: 2019-05-14

## 2019-05-14 NOTE — OUTREACH NOTE
Case Management Call Center Follow-up      Responses   [facility] Call Center Tracking Reason?  Other (specify in comments)   Other Tracking Comments  Questions concerning follow up appointments   Has the Call Center Case Management Follow-up issue been resolved?  Yes   Follow-up Comments  HR CCP placed a call to pt to review his AVS and his recommended follow ups.  Gave patient contact information for new physicians and reminders on his current MDs.  He will be following up with his PCP, Dr. Mcmahon in one week,  his cardiologist Dr. Cuellar, one week,  Dr. Sebas Garcia in 3 months (with CT results),  and Dr. Pierce in 3 weeks.  He will make his appointments accordingly.   Time CC/CM Tracking Completed  6642          Glenis Simms RN    5/14/2019, 12:29 PM

## 2019-05-14 NOTE — TELEPHONE ENCOUNTER
"Caller cannot remember when he was told to follow up with his specialists.  AVS reviewed and it is not clear as to follow up appts.  Message sent to case management for clarification.    Reason for Disposition  • [1] Caller requesting NON-URGENT health information AND [2] PCP's office is the best resource    Additional Information  • Negative: [1] Caller is not with the adult (patient) AND [2] reporting urgent symptoms  • Negative: Lab result questions  • Negative: Medication questions  • Negative: Caller cannot be reached by phone  • Negative: Caller has already spoken to PCP or another triager  • Negative: RN needs further essential information from caller in order to complete triage  • Negative: Requesting regular office appointment  • Negative: Health Information question, no triage required and triager able to answer question  • Negative: General information question, no triage required and triager able to answer question  • Negative: Question about upcoming scheduled test, no triage required and triager able to answer question    Answer Assessment - Initial Assessment Questions  1. REASON FOR CALL or QUESTION: \"What is your reason for calling today?\" or \"How can I best help you?\" or \"What question do you have that I can help answer?\"      It is not clear on my AVS when my follow up appts are.    Protocols used: INFORMATION ONLY CALL-ADULT-      "

## 2019-05-15 ENCOUNTER — READMISSION MANAGEMENT (OUTPATIENT)
Dept: CALL CENTER | Facility: HOSPITAL | Age: 64
End: 2019-05-15

## 2019-05-15 NOTE — OUTREACH NOTE
CHF Week 1 Survey      Responses   Facility patient discharged from?  [facility]   Does the patient have one of the following disease processes/diagnoses(primary or secondary)?  CHF   Is there a successful TCM telephone encounter documented?  No   CHF Week 1 attempt successful?  No   Unsuccessful attempts  Attempt 1          Rich Hicks RN

## 2019-05-15 NOTE — OUTREACH NOTE
Prep Survey      Responses   Facility patient discharged from?  [place]   Is patient eligible?  Yes   Discharge diagnosis  a/c CHF   Does the patient have one of the following disease processes/diagnoses(primary or secondary)?  CHF   Does the patient have Home health ordered?  No   Is there a DME ordered?  No   Prep survey completed?  Yes          Olena Rice RN

## 2019-05-16 ENCOUNTER — READMISSION MANAGEMENT (OUTPATIENT)
Dept: CALL CENTER | Facility: HOSPITAL | Age: 64
End: 2019-05-16

## 2019-05-16 NOTE — OUTREACH NOTE
CHF Week 1 Survey      Responses   Facility patient discharged from?  [facility]   Does the patient have one of the following disease processes/diagnoses(primary or secondary)?  CHF   Is there a successful TCM telephone encounter documented?  No   CHF Week 1 attempt successful?  No   Unsuccessful attempts  Attempt 2          Ashley Titus RN

## 2019-05-21 ENCOUNTER — READMISSION MANAGEMENT (OUTPATIENT)
Dept: CALL CENTER | Facility: HOSPITAL | Age: 64
End: 2019-05-21

## 2019-05-21 NOTE — OUTREACH NOTE
CHF Week 2 Survey      Responses   Facility patient discharged from?  [place]   Does the patient have one of the following disease processes/diagnoses(primary or secondary)?  CHF   Week 2 attempt successful?  No   Unsuccessful attempts  Attempt 1          Emili Rajan RN

## 2019-05-22 ENCOUNTER — READMISSION MANAGEMENT (OUTPATIENT)
Dept: CALL CENTER | Facility: HOSPITAL | Age: 64
End: 2019-05-22

## 2019-05-22 LAB
A ALTERNATA IGE QN: <0.1 KU/L
BERMUDA GRASS IGE QN: 0.12 KU/L
CAT DANDER IGG QN: 0.72 KU/L
COMMON RAGWEED IGE QN: 0.1 KU/L
CONV CLASS DESCRIPTION: ABNORMAL
D FARINAE IGE QN: 0.12 KU/L
D PTERONYSS IGE QN: 0.13 KU/L
DOG DANDER IGE QN: 0.48 KU/L
ENGL PLANTAIN IGE QN: 0.15 KU/L
KENT BLUE GRASS IGE QN: <0.1 KU/L
MOUSE URINE PROT IGE QN: <0.1 KU/L
TOTAL IGE SMQN RAST: 11 IU/ML (ref 6–495)
WHITE ELM IGE QN: <0.1 KU/L
WHITE OAK IGE QN: 0.11 KU/L

## 2019-05-22 NOTE — OUTREACH NOTE
CHF Week 2 Survey      Responses   Facility patient discharged from?  [place]   Does the patient have one of the following disease processes/diagnoses(primary or secondary)?  CHF   Week 2 attempt successful?  Yes   Call start time  1407   Call end time  1411   Discharge diagnosis  a/c CHF   Meds reviewed with patient/caregiver?  Yes   Is the patient taking all medications as directed (includes completed medication regime)?  Yes   Has the patient kept scheduled appointments due by today?  Yes   Comments  Saw PCP today   What is the patient's perception of their health status since discharge?  Same   Nursing interventions  Nurse provided patient education   Is the patient weighing daily?  Yes   Does the patient have scales?  Yes   Daily weight interventions  Education provided on importance of daily weight   Is the patient able to teach back Heart Failure diet management?  Yes   Is the patient able to teach back Heart Failure Zones?  Yes   Is the patient able to teach back signs and symptoms of worsening condition? (i.e. weight gain, shortness of air, etc.)  Yes   Is the patient/caregiver able to teach back the hierarchy of who to call/visit for symptoms/problems? PCP, Specialist, Home health nurse, Urgent Care, ED, 911  Yes   Additional teach back comments  Pt says he is weighing himself daily and trying to eat a heart healthy diet.   CHF Week 2 call completed?  Yes   Wrap up additional comments  Pt says he is still on levaquin for sinus infection and it is making him feel bad all the time.          Dinah Lester RN

## 2019-05-30 ENCOUNTER — READMISSION MANAGEMENT (OUTPATIENT)
Dept: CALL CENTER | Facility: HOSPITAL | Age: 64
End: 2019-05-30

## 2019-05-30 NOTE — OUTREACH NOTE
CHF Week 3 Survey      Responses   Facility patient discharged from?  [place]   Does the patient have one of the following disease processes/diagnoses(primary or secondary)?  CHF   Week 3 attempt successful?  Yes   Call start time  1425   Call end time  1429   Discharge diagnosis  a/c CHF   Meds reviewed with patient/caregiver?  Yes   Is the patient taking all medications as directed (includes completed medication regime)?  Yes   Has the patient kept scheduled appointments due by today?  Yes   Comments  Saw Cardiologist yesterday and they are going to do a heart cath next  week.   Did the patient receive a copy of their discharge instructions?  Yes   Nursing interventions  Reviewed instructions with patient   What is the patient's perception of their health status since discharge?  Improving   Nursing interventions  Nurse provided patient education   Is the patient weighing daily?  Yes   Does the patient have scales?  Yes   Daily weight interventions  Education provided on importance of daily weight   Is the patient able to teach back Heart Failure diet management?  Yes   Is the patient able to teach back Heart Failure Zones?  Yes   Is the patient able to teach back signs and symptoms of worsening condition? (i.e. weight gain, shortness of air, etc.)  Yes   Is the patient/caregiver able to teach back the hierarchy of who to call/visit for symptoms/problems? PCP, Specialist, Home health nurse, Urgent Care, ED, 911  Yes   Additional teach back comments  Pt says he is doing really well.   CHF Week 3 call completed?  Yes          Dinah Lester RN

## 2019-06-06 ENCOUNTER — READMISSION MANAGEMENT (OUTPATIENT)
Dept: CALL CENTER | Facility: HOSPITAL | Age: 64
End: 2019-06-06

## 2019-06-06 NOTE — OUTREACH NOTE
CHF Week 4 Survey      Responses   Facility patient discharged from?  [facility]   Does the patient have one of the following disease processes/diagnoses(primary or secondary)?  CHF   Week 4 attempt successful?  Yes   Call start time  1505   Call end time  1510   Discharge diagnosis  a/c CHF   Meds reviewed with patient/caregiver?  Yes   Is the patient taking all medications as directed (includes completed medication regime)?  Yes   Has the patient kept scheduled appointments due by today?  Yes   Comments  pt just had heart cath and will have Bypass surgery coming up in the near future   Is the patient still receiving Home Health Services?  N/A   Psychosocial issues?  No   What is the patient's perception of their health status since discharge?  Returned to baseline/stable   Nursing interventions  Nurse provided patient education   Is the patient weighing daily?  Yes   Does the patient have scales?  Yes   Daily weight interventions  Education provided on importance of daily weight   Is the patient able to teach back Heart Failure diet management?  Yes   Is the patient able to teach back Heart Failure Zones?  Yes   Is the patient able to teach back signs and symptoms of worsening condition? (i.e. weight gain, shortness of air, etc.)  Yes   Additional teach back comments  Pt still has some SOA but he is back to baseline. He will be having CABG and just had a heart cath today.   Week 4 Call Completed?  Yes   Would the patient like one additional call?  No   Graduated  Yes   Did the patient feel the follow up calls were helpful during their recovery period?  Yes   Was the number of calls appropriate?  Yes          Ashly Young RN

## 2019-11-01 ENCOUNTER — ANESTHESIA (OUTPATIENT)
Dept: PERIOP | Facility: HOSPITAL | Age: 64
End: 2019-11-01

## 2019-11-01 ENCOUNTER — ANESTHESIA EVENT (OUTPATIENT)
Dept: PERIOP | Facility: HOSPITAL | Age: 64
End: 2019-11-01

## 2019-11-01 ENCOUNTER — HOSPITAL ENCOUNTER (EMERGENCY)
Facility: HOSPITAL | Age: 64
Discharge: HOME | End: 2019-11-01
Attending: EMERGENCY MEDICINE | Admitting: OTOLARYNGOLOGY

## 2019-11-01 VITALS
SYSTOLIC BLOOD PRESSURE: 145 MMHG | OXYGEN SATURATION: 98 % | RESPIRATION RATE: 20 BRPM | TEMPERATURE: 97.9 F | HEART RATE: 78 BPM | HEIGHT: 68 IN | BODY MASS INDEX: 32.58 KG/M2 | WEIGHT: 215 LBS | DIASTOLIC BLOOD PRESSURE: 71 MMHG

## 2019-11-01 DIAGNOSIS — D64.9 CHRONIC ANEMIA: ICD-10-CM

## 2019-11-01 DIAGNOSIS — R04.0 EPISTAXIS: Primary | ICD-10-CM

## 2019-11-01 LAB
ALBUMIN SERPL-MCNC: 4.3 G/DL (ref 3.5–5.2)
ALBUMIN/GLOB SERPL: 1.5 G/DL
ALP SERPL-CCNC: 84 U/L (ref 39–117)
ALT SERPL W P-5'-P-CCNC: 26 U/L (ref 1–41)
ANION GAP SERPL CALCULATED.3IONS-SCNC: 13.2 MMOL/L (ref 5–15)
AST SERPL-CCNC: 26 U/L (ref 1–40)
BASOPHILS # BLD AUTO: 0.03 10*3/MM3 (ref 0–0.2)
BASOPHILS NFR BLD AUTO: 0.3 % (ref 0–1.5)
BILIRUB SERPL-MCNC: <0.2 MG/DL (ref 0.2–1.2)
BUN BLD-MCNC: 24 MG/DL (ref 8–23)
BUN/CREAT SERPL: 14.4 (ref 7–25)
CALCIUM SPEC-SCNC: 8.9 MG/DL (ref 8.6–10.5)
CHLORIDE SERPL-SCNC: 97 MMOL/L (ref 98–107)
CO2 SERPL-SCNC: 22.8 MMOL/L (ref 22–29)
CREAT BLD-MCNC: 1.67 MG/DL (ref 0.76–1.27)
DEPRECATED RDW RBC AUTO: 42.6 FL (ref 37–54)
EOSINOPHIL # BLD AUTO: 0.16 10*3/MM3 (ref 0–0.4)
EOSINOPHIL NFR BLD AUTO: 1.9 % (ref 0.3–6.2)
ERYTHROCYTE [DISTWIDTH] IN BLOOD BY AUTOMATED COUNT: 13.7 % (ref 12.3–15.4)
GFR SERPL CREATININE-BSD FRML MDRD: 42 ML/MIN/1.73
GLOBULIN UR ELPH-MCNC: 2.8 GM/DL
GLUCOSE BLD-MCNC: 148 MG/DL (ref 65–99)
HCT VFR BLD AUTO: 29.9 % (ref 37.5–51)
HGB BLD-MCNC: 9.7 G/DL (ref 13–17.7)
IMM GRANULOCYTES # BLD AUTO: 0.05 10*3/MM3 (ref 0–0.05)
IMM GRANULOCYTES NFR BLD AUTO: 0.6 % (ref 0–0.5)
INR PPP: 0.99 (ref 0.9–1.1)
LYMPHOCYTES # BLD AUTO: 0.63 10*3/MM3 (ref 0.7–3.1)
LYMPHOCYTES NFR BLD AUTO: 7.3 % (ref 19.6–45.3)
MCH RBC QN AUTO: 27.6 PG (ref 26.6–33)
MCHC RBC AUTO-ENTMCNC: 32.4 G/DL (ref 31.5–35.7)
MCV RBC AUTO: 85.2 FL (ref 79–97)
MONOCYTES # BLD AUTO: 0.66 10*3/MM3 (ref 0.1–0.9)
MONOCYTES NFR BLD AUTO: 7.7 % (ref 5–12)
NEUTROPHILS # BLD AUTO: 7.05 10*3/MM3 (ref 1.7–7)
NEUTROPHILS NFR BLD AUTO: 82.2 % (ref 42.7–76)
NRBC BLD AUTO-RTO: 0 /100 WBC (ref 0–0.2)
PLATELET # BLD AUTO: 357 10*3/MM3 (ref 140–450)
PMV BLD AUTO: 9 FL (ref 6–12)
POTASSIUM BLD-SCNC: 5.1 MMOL/L (ref 3.5–5.2)
PROT SERPL-MCNC: 7.1 G/DL (ref 6–8.5)
PROTHROMBIN TIME: 12.8 SECONDS (ref 11.7–14.2)
RBC # BLD AUTO: 3.51 10*6/MM3 (ref 4.14–5.8)
SODIUM BLD-SCNC: 133 MMOL/L (ref 136–145)
WBC NRBC COR # BLD: 8.58 10*3/MM3 (ref 3.4–10.8)

## 2019-11-01 PROCEDURE — 25010000002 SUCCINYLCHOLINE PER 20 MG: Performed by: NURSE ANESTHETIST, CERTIFIED REGISTERED

## 2019-11-01 PROCEDURE — 25010000002 FENTANYL CITRATE (PF) 100 MCG/2ML SOLUTION: Performed by: NURSE ANESTHETIST, CERTIFIED REGISTERED

## 2019-11-01 PROCEDURE — 25010000002 FENTANYL CITRATE (PF) 100 MCG/2ML SOLUTION: Performed by: EMERGENCY MEDICINE

## 2019-11-01 PROCEDURE — 85610 PROTHROMBIN TIME: CPT | Performed by: EMERGENCY MEDICINE

## 2019-11-01 PROCEDURE — 25010000002 HYDROMORPHONE PER 4 MG: Performed by: NURSE ANESTHETIST, CERTIFIED REGISTERED

## 2019-11-01 PROCEDURE — 99284 EMERGENCY DEPT VISIT MOD MDM: CPT

## 2019-11-01 PROCEDURE — 25010000002 EPINEPHRINE PER 0.1 MG: Performed by: OTOLARYNGOLOGY

## 2019-11-01 PROCEDURE — 25010000002 FENTANYL CITRATE (PF) 100 MCG/2ML SOLUTION: Performed by: ANESTHESIOLOGY

## 2019-11-01 PROCEDURE — 25010000002 HYDRALAZINE PER 20 MG: Performed by: NURSE ANESTHETIST, CERTIFIED REGISTERED

## 2019-11-01 PROCEDURE — 80053 COMPREHEN METABOLIC PANEL: CPT | Performed by: EMERGENCY MEDICINE

## 2019-11-01 PROCEDURE — 85025 COMPLETE CBC W/AUTO DIFF WBC: CPT | Performed by: EMERGENCY MEDICINE

## 2019-11-01 PROCEDURE — 25010000002 DEXAMETHASONE PER 1 MG: Performed by: NURSE ANESTHETIST, CERTIFIED REGISTERED

## 2019-11-01 PROCEDURE — 25010000002 PROPOFOL 10 MG/ML EMULSION: Performed by: NURSE ANESTHETIST, CERTIFIED REGISTERED

## 2019-11-01 PROCEDURE — 25010000002 ONDANSETRON PER 1 MG: Performed by: NURSE ANESTHETIST, CERTIFIED REGISTERED

## 2019-11-01 PROCEDURE — 96374 THER/PROPH/DIAG INJ IV PUSH: CPT

## 2019-11-01 PROCEDURE — 25010000002 HYDRALAZINE PER 20 MG

## 2019-11-01 RX ORDER — HYDRALAZINE HYDROCHLORIDE 20 MG/ML
5 INJECTION INTRAMUSCULAR; INTRAVENOUS
Status: DISCONTINUED | OUTPATIENT
Start: 2019-11-01 | End: 2019-11-01 | Stop reason: HOSPADM

## 2019-11-01 RX ORDER — FENTANYL CITRATE 50 UG/ML
50 INJECTION, SOLUTION INTRAMUSCULAR; INTRAVENOUS
Status: DISCONTINUED | OUTPATIENT
Start: 2019-11-01 | End: 2019-11-01 | Stop reason: HOSPADM

## 2019-11-01 RX ORDER — LIDOCAINE HYDROCHLORIDE AND EPINEPHRINE 10; 10 MG/ML; UG/ML
INJECTION, SOLUTION INFILTRATION; PERINEURAL AS NEEDED
Status: DISCONTINUED | OUTPATIENT
Start: 2019-11-01 | End: 2019-11-01 | Stop reason: HOSPADM

## 2019-11-01 RX ORDER — MIDAZOLAM HYDROCHLORIDE 1 MG/ML
2 INJECTION INTRAMUSCULAR; INTRAVENOUS
Status: DISCONTINUED | OUTPATIENT
Start: 2019-11-01 | End: 2019-11-01 | Stop reason: HOSPADM

## 2019-11-01 RX ORDER — ONDANSETRON 2 MG/ML
INJECTION INTRAMUSCULAR; INTRAVENOUS AS NEEDED
Status: DISCONTINUED | OUTPATIENT
Start: 2019-11-01 | End: 2019-11-01 | Stop reason: SURG

## 2019-11-01 RX ORDER — PROMETHAZINE HYDROCHLORIDE 25 MG/ML
12.5 INJECTION, SOLUTION INTRAMUSCULAR; INTRAVENOUS ONCE AS NEEDED
Status: DISCONTINUED | OUTPATIENT
Start: 2019-11-01 | End: 2019-11-01 | Stop reason: HOSPADM

## 2019-11-01 RX ORDER — MAGNESIUM HYDROXIDE 1200 MG/15ML
LIQUID ORAL AS NEEDED
Status: DISCONTINUED | OUTPATIENT
Start: 2019-11-01 | End: 2019-11-01 | Stop reason: HOSPADM

## 2019-11-01 RX ORDER — LIDOCAINE HYDROCHLORIDE AND EPINEPHRINE 10; 10 MG/ML; UG/ML
10 INJECTION, SOLUTION INFILTRATION; PERINEURAL ONCE
Status: COMPLETED | OUTPATIENT
Start: 2019-11-01 | End: 2019-11-01

## 2019-11-01 RX ORDER — OXYCODONE AND ACETAMINOPHEN 7.5; 325 MG/1; MG/1
1 TABLET ORAL ONCE AS NEEDED
Status: DISCONTINUED | OUTPATIENT
Start: 2019-11-01 | End: 2019-11-01 | Stop reason: HOSPADM

## 2019-11-01 RX ORDER — PROPOFOL 10 MG/ML
VIAL (ML) INTRAVENOUS AS NEEDED
Status: DISCONTINUED | OUTPATIENT
Start: 2019-11-01 | End: 2019-11-01 | Stop reason: SURG

## 2019-11-01 RX ORDER — DEXLANSOPRAZOLE 60 MG/1
60 CAPSULE, DELAYED RELEASE ORAL DAILY
COMMUNITY

## 2019-11-01 RX ORDER — DIPHENHYDRAMINE HYDROCHLORIDE 50 MG/ML
12.5 INJECTION INTRAMUSCULAR; INTRAVENOUS
Status: DISCONTINUED | OUTPATIENT
Start: 2019-11-01 | End: 2019-11-01 | Stop reason: HOSPADM

## 2019-11-01 RX ORDER — LABETALOL HYDROCHLORIDE 5 MG/ML
5 INJECTION, SOLUTION INTRAVENOUS
Status: DISCONTINUED | OUTPATIENT
Start: 2019-11-01 | End: 2019-11-01 | Stop reason: HOSPADM

## 2019-11-01 RX ORDER — SODIUM CHLORIDE 9 MG/ML
125 INJECTION, SOLUTION INTRAVENOUS CONTINUOUS
Status: DISCONTINUED | OUTPATIENT
Start: 2019-11-01 | End: 2019-11-01 | Stop reason: HOSPADM

## 2019-11-01 RX ORDER — EPHEDRINE SULFATE 50 MG/ML
5 INJECTION, SOLUTION INTRAVENOUS ONCE AS NEEDED
Status: DISCONTINUED | OUTPATIENT
Start: 2019-11-01 | End: 2019-11-01 | Stop reason: HOSPADM

## 2019-11-01 RX ORDER — FENTANYL CITRATE 50 UG/ML
50 INJECTION, SOLUTION INTRAMUSCULAR; INTRAVENOUS ONCE
Status: COMPLETED | OUTPATIENT
Start: 2019-11-01 | End: 2019-11-01

## 2019-11-01 RX ORDER — LIDOCAINE HYDROCHLORIDE 10 MG/ML
0.5 INJECTION, SOLUTION EPIDURAL; INFILTRATION; INTRACAUDAL; PERINEURAL ONCE AS NEEDED
Status: DISCONTINUED | OUTPATIENT
Start: 2019-11-01 | End: 2019-11-01 | Stop reason: HOSPADM

## 2019-11-01 RX ORDER — LIDOCAINE HYDROCHLORIDE 20 MG/ML
INJECTION, SOLUTION INFILTRATION; PERINEURAL AS NEEDED
Status: DISCONTINUED | OUTPATIENT
Start: 2019-11-01 | End: 2019-11-01 | Stop reason: SURG

## 2019-11-01 RX ORDER — FLUMAZENIL 0.1 MG/ML
0.2 INJECTION INTRAVENOUS AS NEEDED
Status: DISCONTINUED | OUTPATIENT
Start: 2019-11-01 | End: 2019-11-01 | Stop reason: HOSPADM

## 2019-11-01 RX ORDER — ACETAMINOPHEN 325 MG/1
650 TABLET ORAL ONCE AS NEEDED
Status: DISCONTINUED | OUTPATIENT
Start: 2019-11-01 | End: 2019-11-01 | Stop reason: HOSPADM

## 2019-11-01 RX ORDER — SODIUM CHLORIDE 0.9 % (FLUSH) 0.9 %
3-10 SYRINGE (ML) INJECTION AS NEEDED
Status: DISCONTINUED | OUTPATIENT
Start: 2019-11-01 | End: 2019-11-01 | Stop reason: HOSPADM

## 2019-11-01 RX ORDER — ONDANSETRON 2 MG/ML
4 INJECTION INTRAMUSCULAR; INTRAVENOUS ONCE AS NEEDED
Status: DISCONTINUED | OUTPATIENT
Start: 2019-11-01 | End: 2019-11-01 | Stop reason: HOSPADM

## 2019-11-01 RX ORDER — HYDROCODONE BITARTRATE AND ACETAMINOPHEN 7.5; 325 MG/1; MG/1
1 TABLET ORAL ONCE AS NEEDED
Status: COMPLETED | OUTPATIENT
Start: 2019-11-01 | End: 2019-11-01

## 2019-11-01 RX ORDER — SODIUM CHLORIDE 0.9 % (FLUSH) 0.9 %
10 SYRINGE (ML) INJECTION AS NEEDED
Status: DISCONTINUED | OUTPATIENT
Start: 2019-11-01 | End: 2019-11-01 | Stop reason: HOSPADM

## 2019-11-01 RX ORDER — CYCLOBENZAPRINE HCL 10 MG
10 TABLET ORAL NIGHTLY
COMMUNITY

## 2019-11-01 RX ORDER — DILTIAZEM HYDROCHLORIDE 180 MG/1
180 CAPSULE, COATED, EXTENDED RELEASE ORAL DAILY
COMMUNITY

## 2019-11-01 RX ORDER — EPINEPHRINE 1 MG/ML
INJECTION, SOLUTION, CONCENTRATE INTRAVENOUS AS NEEDED
Status: DISCONTINUED | OUTPATIENT
Start: 2019-11-01 | End: 2019-11-01 | Stop reason: HOSPADM

## 2019-11-01 RX ORDER — HYDRALAZINE HYDROCHLORIDE 25 MG/1
25 TABLET, FILM COATED ORAL 2 TIMES DAILY
COMMUNITY

## 2019-11-01 RX ORDER — SUCCINYLCHOLINE CHLORIDE 20 MG/ML
INJECTION INTRAMUSCULAR; INTRAVENOUS AS NEEDED
Status: DISCONTINUED | OUTPATIENT
Start: 2019-11-01 | End: 2019-11-01 | Stop reason: SURG

## 2019-11-01 RX ORDER — HYDRALAZINE HYDROCHLORIDE 20 MG/ML
INJECTION INTRAMUSCULAR; INTRAVENOUS
Status: COMPLETED
Start: 2019-11-01 | End: 2019-11-01

## 2019-11-01 RX ORDER — ATORVASTATIN CALCIUM 40 MG/1
40 TABLET, FILM COATED ORAL NIGHTLY
COMMUNITY

## 2019-11-01 RX ORDER — BACITRACIN ZINC 500 [USP'U]/G
OINTMENT TOPICAL AS NEEDED
Status: DISCONTINUED | OUTPATIENT
Start: 2019-11-01 | End: 2019-11-01 | Stop reason: HOSPADM

## 2019-11-01 RX ORDER — ROCURONIUM BROMIDE 10 MG/ML
INJECTION, SOLUTION INTRAVENOUS AS NEEDED
Status: DISCONTINUED | OUTPATIENT
Start: 2019-11-01 | End: 2019-11-01 | Stop reason: SURG

## 2019-11-01 RX ORDER — PROMETHAZINE HYDROCHLORIDE 25 MG/1
25 SUPPOSITORY RECTAL ONCE AS NEEDED
Status: DISCONTINUED | OUTPATIENT
Start: 2019-11-01 | End: 2019-11-01 | Stop reason: HOSPADM

## 2019-11-01 RX ORDER — DEXAMETHASONE SODIUM PHOSPHATE 10 MG/ML
INJECTION INTRAMUSCULAR; INTRAVENOUS AS NEEDED
Status: DISCONTINUED | OUTPATIENT
Start: 2019-11-01 | End: 2019-11-01 | Stop reason: SURG

## 2019-11-01 RX ORDER — NALOXONE HCL 0.4 MG/ML
0.2 VIAL (ML) INJECTION AS NEEDED
Status: DISCONTINUED | OUTPATIENT
Start: 2019-11-01 | End: 2019-11-01 | Stop reason: HOSPADM

## 2019-11-01 RX ORDER — PROMETHAZINE HYDROCHLORIDE 25 MG/1
25 TABLET ORAL ONCE AS NEEDED
Status: DISCONTINUED | OUTPATIENT
Start: 2019-11-01 | End: 2019-11-01 | Stop reason: HOSPADM

## 2019-11-01 RX ORDER — AMIODARONE HYDROCHLORIDE 200 MG/1
200 TABLET ORAL
COMMUNITY

## 2019-11-01 RX ORDER — PROMETHAZINE HYDROCHLORIDE 25 MG/ML
6.25 INJECTION, SOLUTION INTRAMUSCULAR; INTRAVENOUS
Status: DISCONTINUED | OUTPATIENT
Start: 2019-11-01 | End: 2019-11-01 | Stop reason: HOSPADM

## 2019-11-01 RX ORDER — SODIUM CHLORIDE 0.9 % (FLUSH) 0.9 %
3 SYRINGE (ML) INJECTION EVERY 12 HOURS SCHEDULED
Status: DISCONTINUED | OUTPATIENT
Start: 2019-11-01 | End: 2019-11-01 | Stop reason: HOSPADM

## 2019-11-01 RX ORDER — FAMOTIDINE 10 MG/ML
20 INJECTION, SOLUTION INTRAVENOUS ONCE
Status: COMPLETED | OUTPATIENT
Start: 2019-11-01 | End: 2019-11-01

## 2019-11-01 RX ORDER — SODIUM CHLORIDE, SODIUM LACTATE, POTASSIUM CHLORIDE, CALCIUM CHLORIDE 600; 310; 30; 20 MG/100ML; MG/100ML; MG/100ML; MG/100ML
9 INJECTION, SOLUTION INTRAVENOUS CONTINUOUS
Status: DISCONTINUED | OUTPATIENT
Start: 2019-11-01 | End: 2019-11-01 | Stop reason: HOSPADM

## 2019-11-01 RX ORDER — HYDROMORPHONE HYDROCHLORIDE 1 MG/ML
0.5 INJECTION, SOLUTION INTRAMUSCULAR; INTRAVENOUS; SUBCUTANEOUS
Status: DISCONTINUED | OUTPATIENT
Start: 2019-11-01 | End: 2019-11-01 | Stop reason: HOSPADM

## 2019-11-01 RX ORDER — DIPHENHYDRAMINE HCL 25 MG
25 CAPSULE ORAL
Status: DISCONTINUED | OUTPATIENT
Start: 2019-11-01 | End: 2019-11-01 | Stop reason: HOSPADM

## 2019-11-01 RX ORDER — MIDAZOLAM HYDROCHLORIDE 1 MG/ML
1 INJECTION INTRAMUSCULAR; INTRAVENOUS
Status: DISCONTINUED | OUTPATIENT
Start: 2019-11-01 | End: 2019-11-01 | Stop reason: HOSPADM

## 2019-11-01 RX ADMIN — FAMOTIDINE 20 MG: 10 INJECTION INTRAVENOUS at 13:19

## 2019-11-01 RX ADMIN — FENTANYL CITRATE 50 MCG: 50 INJECTION INTRAMUSCULAR; INTRAVENOUS at 14:02

## 2019-11-01 RX ADMIN — FENTANYL CITRATE 50 MCG: 50 INJECTION, SOLUTION INTRAMUSCULAR; INTRAVENOUS at 16:05

## 2019-11-01 RX ADMIN — FENTANYL CITRATE 50 MCG: 50 INJECTION, SOLUTION INTRAMUSCULAR; INTRAVENOUS at 11:14

## 2019-11-01 RX ADMIN — HYDRALAZINE HYDROCHLORIDE 5 MG: 20 INJECTION INTRAMUSCULAR; INTRAVENOUS at 15:15

## 2019-11-01 RX ADMIN — SODIUM CHLORIDE 125 ML/HR: 9 INJECTION, SOLUTION INTRAVENOUS at 10:45

## 2019-11-01 RX ADMIN — SUCCINYLCHOLINE CHLORIDE 100 MG: 20 INJECTION, SOLUTION INTRAMUSCULAR; INTRAVENOUS; PARENTERAL at 13:52

## 2019-11-01 RX ADMIN — FENTANYL CITRATE 25 MCG: 50 INJECTION INTRAMUSCULAR; INTRAVENOUS at 13:18

## 2019-11-01 RX ADMIN — LIDOCAINE HYDROCHLORIDE 100 MG: 20 INJECTION, SOLUTION INFILTRATION; PERINEURAL at 13:52

## 2019-11-01 RX ADMIN — HYDROMORPHONE HYDROCHLORIDE 0.5 MG: 1 INJECTION, SOLUTION INTRAMUSCULAR; INTRAVENOUS; SUBCUTANEOUS at 16:43

## 2019-11-01 RX ADMIN — HYDRALAZINE HYDROCHLORIDE 5 MG: 20 INJECTION INTRAMUSCULAR; INTRAVENOUS at 15:05

## 2019-11-01 RX ADMIN — Medication 3 ML: at 12:44

## 2019-11-01 RX ADMIN — FENTANYL CITRATE 50 MCG: 50 INJECTION INTRAMUSCULAR; INTRAVENOUS at 13:52

## 2019-11-01 RX ADMIN — LIDOCAINE HYDROCHLORIDE,EPINEPHRINE BITARTRATE 10 ML: 10; .01 INJECTION, SOLUTION INFILTRATION; PERINEURAL at 10:47

## 2019-11-01 RX ADMIN — ONDANSETRON 4 MG: 2 INJECTION INTRAMUSCULAR; INTRAVENOUS at 14:13

## 2019-11-01 RX ADMIN — ROCURONIUM BROMIDE 10 MG: 10 INJECTION INTRAVENOUS at 13:52

## 2019-11-01 RX ADMIN — PROPOFOL 200 MG: 10 INJECTION, EMULSION INTRAVENOUS at 13:52

## 2019-11-01 RX ADMIN — DEXAMETHASONE SODIUM PHOSPHATE 8 MG: 10 INJECTION INTRAMUSCULAR; INTRAVENOUS at 14:13

## 2019-11-01 RX ADMIN — HYDROCODONE BITARTRATE AND ACETAMINOPHEN 1 TABLET: 7.5; 325 TABLET ORAL at 15:36

## 2019-11-01 RX ADMIN — FENTANYL CITRATE 50 MCG: 50 INJECTION, SOLUTION INTRAMUSCULAR; INTRAVENOUS at 15:24

## 2019-11-01 RX ADMIN — SODIUM CHLORIDE, POTASSIUM CHLORIDE, SODIUM LACTATE AND CALCIUM CHLORIDE: 600; 310; 30; 20 INJECTION, SOLUTION INTRAVENOUS at 13:50

## 2019-11-01 RX ADMIN — ROCURONIUM BROMIDE 10 MG: 10 INJECTION INTRAVENOUS at 14:02

## 2019-11-01 RX ADMIN — FENTANYL CITRATE 50 MCG: 50 INJECTION, SOLUTION INTRAMUSCULAR; INTRAVENOUS at 12:43

## 2019-11-01 RX ADMIN — Medication 2 SPRAY: at 10:47

## 2019-11-01 NOTE — ED PROVIDER NOTES
" EMERGENCY DEPARTMENT ENCOUNTER    CHIEF COMPLAINT  Chief Complaint: Epistaxis  History given by: pt  History limited by: none  Room Number: 08/08  PMD: Ankit Mcmahon MD   ENT: Dr Pierce      HPI:  Pt is a 64 y.o. male who presents complaining of moderate left sided nose bleed that started around 0330 this morning. Pt states he had sinus surgery two weeks ago by ENT, Dr Pierce.  Pt states he is not currently on blood thinners.    Duration/ Onset/ Timing: six hours, gradual, constant  Location: left nare  Radiation: none  Quality: \"nose bleeds\"  Intensity/Severity: moderate  Progression: unchanged  Associated Symptoms: none      PAST MEDICAL HISTORY  Active Ambulatory Problems     Diagnosis Date Noted   • HTN (hypertension) 05/12/2017   • Asthma 05/12/2017   • Hepatitis C 05/12/2017   • Acute congestive heart failure (CMS/HCC) 05/08/2019   • Acute sinusitis 05/09/2019   • History of atrial fibrillation    • Aortic stenosis    • Liver transplant recipient (CMS/HCC)    • Immunosuppressed status (CMS/HCC)    • CKD (chronic kidney disease) stage 3, GFR 30-59 ml/min (CMS/HCC) 05/09/2019     Resolved Ambulatory Problems     Diagnosis Date Noted   • Back pain 05/12/2017     Past Medical History:   Diagnosis Date   • Acid reflux    • Aortic stenosis    • Arthritis    • Asthma    • CKD (chronic kidney disease) stage 3, GFR 30-59 ml/min (CMS/HCC) 5/9/2019   • Hepatitis C    • History of atrial fibrillation    • Hypertension    • Hypothyroidism    • Immunosuppressed status (CMS/HCC)    • Liver transplant recipient (CMS/HCC)    • Low back pain        PAST SURGICAL HISTORY  Past Surgical History:   Procedure Laterality Date   • APPENDECTOMY     • EPIDURAL BLOCK     • INTERVENTIONAL RADIOLOGY PROCEDURE Right 3/22/2017    Procedure: ARTHROCENTESIS ASPIRATION RIGHT KNEE ;  Surgeon: Bennett Resendiz DO;  Location: [facility] OR;  Service:    • KNEE SURGERY Bilateral    • LIVER TRANSPLANTATION      2010 DEC   • LUMBAR DISCECTOMY " FUSION INSTRUMENTATION Bilateral 3/22/2017    Procedure: BILATERAL L 4-5 MICROLAMINECTOMY WITH METRIX ;  Surgeon: Bennett Resendiz DO;  Location: [facility] OR;  Service:    • LUMBAR DISCECTOMY FUSION INSTRUMENTATION Bilateral 2017    Procedure: Minimally invasive transforaminal lumbar interbody fusion lumbar four to lumbar five;  Surgeon: Bennett Resendiz DO;  Location: [facility];  Service:    • SHOULDER SURGERY Right        FAMILY HISTORY  No family history on file.    SOCIAL HISTORY  Social History     Socioeconomic History   • Marital status:      Spouse name: Not on file   • Number of children: Not on file   • Years of education: Not on file   • Highest education level: Not on file   Tobacco Use   • Smoking status: Former Smoker     Packs/day: 0.50     Years: 15.00     Pack years: 7.50     Types: Cigarettes     Last attempt to quit:      Years since quittin.8   • Smokeless tobacco: Never Used   Substance and Sexual Activity   • Alcohol use: No   • Drug use: No   • Sexual activity: Defer       ALLERGIES  Lisinopril; Ace inhibitors; Metoprolol; and Penicillins    REVIEW OF SYSTEMS  Review of Systems   Constitutional: Negative for chills and fever.   HENT: Positive for nosebleeds (left).    Respiratory: Negative for shortness of breath.    Cardiovascular: Negative for chest pain.   Gastrointestinal: Negative for abdominal pain.   Genitourinary: Negative for dysuria.   All other systems reviewed and are negative.      PHYSICAL EXAM  ED Triage Vitals [19 0910]   Temp Heart Rate Resp BP SpO2   97.2 °F (36.2 °C) 71 18 162/85 97 %      Temp src Heart Rate Source Patient Position BP Location FiO2 (%)   -- -- -- -- --       Physical Exam   Constitutional: No distress.   HENT:   Head: Normocephalic and atraumatic.   Nose: Epistaxis (large clot in left nare; active bleeding) is observed.   Drainage of epistaxis down the oropharynx is present   Eyes: EOM are normal. No scleral icterus.   No  conjunctival pallor   Neck: Normal range of motion.   Cardiovascular: Normal rate, regular rhythm and normal heart sounds.   Pulmonary/Chest: No respiratory distress.   Abdominal: There is no tenderness.   Musculoskeletal: He exhibits no edema.   Neurological: He is alert.   Skin: Skin is warm and dry. No pallor.   Nursing note and vitals reviewed.      LAB RESULTS  Lab Results (last 24 hours)     Procedure Component Value Units Date/Time    Comprehensive Metabolic Panel [[id number]]  (Abnormal) Collected:  11/01/19 1044    Specimen:  Blood Updated:  11/01/19 1122     Glucose 148 mg/dL      BUN 24 mg/dL      Creatinine 1.67 mg/dL      Sodium 133 mmol/L      Potassium 5.1 mmol/L      Chloride 97 mmol/L      CO2 22.8 mmol/L      Calcium 8.9 mg/dL      Total Protein 7.1 g/dL      Albumin 4.30 g/dL      ALT (SGPT) 26 U/L      AST (SGOT) 26 U/L      Alkaline Phosphatase 84 U/L      Total Bilirubin <0.2 mg/dL      eGFR Non African Amer 42 mL/min/1.73      Globulin 2.8 gm/dL      A/G Ratio 1.5 g/dL      BUN/Creatinine Ratio 14.4     Anion Gap 13.2 mmol/L     Narrative:       GFR Normal >60  Chronic Kidney Disease <60  Kidney Failure <15    CBC & Differential [[id number]] Collected:  11/01/19 1044    Specimen:  Blood Updated:  11/01/19 1103    Narrative:       The following orders were created for panel order CBC & Differential.  Procedure                               Abnormality         Status                     ---------                               -----------         ------                     CBC Auto Differential[[id number]]        Abnormal            Final result                 Please view results for these tests on the individual orders.    Protime-INR [[id number]]  (Normal) Collected:  11/01/19 1044    Specimen:  Blood Updated:  11/01/19 1116     Protime 12.8 Seconds      INR 0.99    CBC Auto Differential [[id number]]  (Abnormal) Collected:  11/01/19 1044    Specimen:  Blood Updated:  11/01/19 1103     WBC 8.58  10*3/mm3      RBC 3.51 10*6/mm3      Hemoglobin 9.7 g/dL      Hematocrit 29.9 %      MCV 85.2 fL      MCH 27.6 pg      MCHC 32.4 g/dL      RDW 13.7 %      RDW-SD 42.6 fl      MPV 9.0 fL      Platelets 357 10*3/mm3      Neutrophil % 82.2 %      Lymphocyte % 7.3 %      Monocyte % 7.7 %      Eosinophil % 1.9 %      Basophil % 0.3 %      Immature Grans % 0.6 %      Neutrophils, Absolute 7.05 10*3/mm3      Lymphocytes, Absolute 0.63 10*3/mm3      Monocytes, Absolute 0.66 10*3/mm3      Eosinophils, Absolute 0.16 10*3/mm3      Basophils, Absolute 0.03 10*3/mm3      Immature Grans, Absolute 0.05 10*3/mm3      nRBC 0.0 /100 WBC           I ordered the above labs and reviewed the results         PROCEDURES  Epistaxis Management  Date/Time: 11/1/2019 9:53 AM  Performed by: Joseph Franks MD  Authorized by: Joseph Franks MD     Consent:     Consent obtained:  Verbal    Consent given by:  Patient  Anesthesia (see MAR for exact dosages):     Anesthesia method:  None  Procedure details:     Treatment site:  Unable to specify (left)    Repair method: phenylephrine,, lidocaine 1% WITH epi.    Treatment complexity:  Extensive    Treatment episode: initial    Post-procedure details:     Assessment:  Bleeding decreased    Patient tolerance of procedure:  Tolerated well, no immediate complications  Epistaxis Management  Date/Time: 11/1/2019 10:23 AM  Performed by: Joseph Franks MD  Authorized by: Joseph Franks MD     Consent:     Consent obtained:  Verbal    Consent given by:  Patient  Anesthesia (see MAR for exact dosages):     Anesthesia method:  None  Procedure details:     Treatment site:  Unable to specify (left)    Treatment method:  Nasal tampon    Treatment complexity:  Extensive    Treatment episode: recurring    Post-procedure details:     Assessment:  No improvement    Patient tolerance of procedure:  Tolerated well, no immediate complications          PROGRESS AND CONSULTS       0914  Discussed plan to attempt to stop the  bleeding. Pt understands and agrees with the plan. All questions have been answered.    1022  Rechecked patient who is still bleeding impressively.  Discussed plan to place a packing. Pt understands and agrees with the plan. All questions have been answered.    1030  Pt is still bleeding around nasal tampon. Discussed plan to consult ENT. Pt understands and agrees with the plan. All questions have been answered.    1039  Discussed case with Dr Pierce, ENT  Reviewed history, exam, results and treatments.  Discussed concerns and plan of care.   Dr Pierce will come see the pt.    1139  Rechecked patient who is resting and states that the bleeding in the back of the oral cavity has slowed  Discussed all lab results. Discussed plan to have Dr Pierce see the pt. Pt understands and agrees with the plan. All questions have been answered.  Bleeding relatively controlled at this point. There is a large clot in the back of the oral cavity. Will wait for ENT consult.     1216  Dr Pierce is now seeing the pt.    1231  Dr Pierce will take the pt to the OR.     MEDICAL DECISION MAKING  Results were reviewed/discussed with the patient and they were also made aware of online access. Pt also made aware that some labs, such as cultures, will not be resulted during ER visit and follow up with PMD is necessary.     MDM  Number of Diagnoses or Management Options     Amount and/or Complexity of Data Reviewed  Clinical lab tests: reviewed and ordered (No acute changes)  Decide to obtain previous medical records or to obtain history from someone other than the patient: yes (Epic)  Discuss the patient with other providers: yes (Dr Pierce)           DIAGNOSIS  Final diagnoses:   Epistaxis   Chronic anemia       DISPOSITION  Send to OR.  --  Documentation assistance provided by anahi Gresham for Dr Franks.  Information recorded by the anahi was done at my direction and has been verified and validated by me.            Mp  Lolly  11/01/19 1232       Joseph Franks MD  11/01/19 3243

## 2019-11-01 NOTE — OP NOTE
Operative note    Preop diagnosis: Left complex posterior epistaxis    Postop diagnosis: Same    Procedure: Endoscopic management left posterior complex epistaxis    Surgeon: Stan    Blood loss: 100 mL    Complications: None    Findings: Left superior septal arterial bleeder number medial to middle turbinate    Description: Patient was placed supine on the operating table where general anesthetic was administered.    Left nasal packing was removed.  Sterile draping was applied.    Clot was evacuated from the left nasal cavity and we were met with immediate brisk arterial bleeding.  We placed cottonoids soaked lightly and topical adrenaline.  Under withdrawal of the cottonoid, I evacuated additional clot and eventually found an arterial bleeder high on the septum adjacent to the middle turbinate.    I extinguished the bleeder with suction electrocautery.    We then evacuated all clot from the right nasal cavity.  Slight oozing was present posteriorly near the attachment of the middle turbinate.    I lightly cauterized several areas of oozing with suction electrocautery.    We then flushed both nasal cavities copiously with cool saline.    I placed Surgicel within the space medial to the middle turbinate where the bleeding had been observed.    On the left eye placed a 4 cm Merisel adjacent to the middle turbinate and an 8 cm Merisel along the floor the nose.  Placed 8  Cm merocel on the right    Patient was awake return to recovery

## 2019-11-01 NOTE — ANESTHESIA PREPROCEDURE EVALUATION
Anesthesia Evaluation     Patient summary reviewed and Nursing notes reviewed                Airway   Mallampati: II  No difficulty expected  Dental      Pulmonary    (+) a smoker Former, asthma,  Cardiovascular     ECG reviewed  Rhythm: regular  Rate: normal    (+) hypertension, valvular problems/murmurs AS and MR, dysrhythmias Paroxysmal Atrial Fib, CHF ,       Neuro/Psych- negative ROS  GI/Hepatic/Renal/Endo    (+)  GERD,  hepatitis C, liver disease, renal disease CRI,     Musculoskeletal     Abdominal    Substance History - negative use     OB/GYN negative ob/gyn ROS         Other   arthritis,                    Anesthesia Plan    ASA 3 - emergent     general   (Nasal bleed  Medical history includes orthotopic liver transplantation in 2010 for Hep C cirrhosis  CRI    Immunosuppressed    GETA   RSI)  intravenous induction   Anesthetic plan, all risks, benefits, and alternatives have been provided, discussed and informed consent has been obtained with: patient.

## 2019-11-01 NOTE — ANESTHESIA PROCEDURE NOTES
Airway  Urgency: elective    Date/Time: 11/1/2019 1:53 PM  Airway not difficult    General Information and Staff    Patient location during procedure: OR  CRNA: Chip Guadalupe CRNA    Indications and Patient Condition  Indications for airway management: airway protection    Preoxygenated: yes  MILS maintained throughout  Mask difficulty assessment: 0 - not attempted    Final Airway Details  Final airway type: endotracheal airway      Successful airway: ETT  Cuffed: yes   Successful intubation technique: RSI and video laryngoscopy  Endotracheal tube insertion site: oral  Blade: CMAC  Blade size: 3  ETT size (mm): 7.0  Cormack-Lehane Classification: grade I - full view of glottis  Placement verified by: chest auscultation   Number of attempts at approach: 1  Assessment: lips, teeth, and gum same as pre-op and atraumatic intubation

## 2019-11-01 NOTE — ANESTHESIA POSTPROCEDURE EVALUATION
Patient: Milton Figueroa    Procedure Summary     Date:  11/01/19 Room / Location:  [facility] OR  / [facility] MAIN OR    Anesthesia Start:  1350 Anesthesia Stop:  1501    Procedure:  ENDOSCOPIC FUNCTIONAL SINUS SURGERY  MANAGMENT OF NASAL HEMORRHAGE (N/A Nose) Diagnosis:      Surgeon:  Jenaro Pierce MD Provider:  Elder Cortés MD    Anesthesia Type:  general ASA Status:  3 - Emergent          Anesthesia Type: general  Last vitals  BP   152/75 (11/01/19 1525)   Temp   36.6 °C (97.9 °F) (11/01/19 1500)   Pulse   77 (11/01/19 1525)   Resp   22 (11/01/19 1525)     SpO2   94 % (11/01/19 1525)     Post Anesthesia Care and Evaluation    Patient location during evaluation: PACU  Patient participation: complete - patient participated  Level of consciousness: awake and alert  Pain management: adequate  Airway patency: patent  Anesthetic complications: No anesthetic complications    Cardiovascular status: acceptable  Respiratory status: acceptable  Hydration status: acceptable    Comments: --------------------            11/01/19               1525     --------------------   BP:       152/75     Pulse:      77       Resp:       22       Temp:                SpO2:      94%      --------------------

## 2019-11-01 NOTE — H&P
"    Patient Care Team:  Ankit Mcmahon MD as PCP - General (Family Medicine)  Shari Cuellar MD as Consulting Physician (Cardiology)    Chief complaint my nose is bleeding    Subjective     64-year-old gentleman 2 weeks status post resection intranasal mass with correction of severe deviated nasal septum now presents with intractable left epistaxis.  Patient states he has been \"bleeding since 3 AM\".    Urgency room management including the placement of a posterior balloon pack failed to control his bleeding.  She is now brought electively to the operating room for management of posterior nasal bleeding status post endoscopic surgery for intranasal mass    Review of Systems   Pertinent items are noted in HPI    History  Past Medical History:   Diagnosis Date   • Acid reflux    • Aortic stenosis    • Arthritis    • Asthma    • CKD (chronic kidney disease) stage 3, GFR 30-59 ml/min (CMS/HCC) 5/9/2019   • Hepatitis C     HAD LIVER TRANSPLANT 2010   • History of atrial fibrillation    • Hypertension    • Hypothyroidism    • Immunosuppressed status (CMS/HCC)    • Liver transplant recipient (CMS/HCC)    • Low back pain     PAIN DOWN BOTH LEGS       Objective     Vital Signs  Temp:  [97.2 °F (36.2 °C)-97.6 °F (36.4 °C)] 97.6 °F (36.4 °C)  Heart Rate:  [71-84] 84  Resp:  [18-24] 24  BP: (162-177)/(85-87) 177/87    Physical Exam:    Nasal packing left nare.  Active bleeding observed in the oropharynx.  Oozing around the margin of the pack.  She is gagging.Lungs are clear to auscultation.  Abdomen soft.  Extremities show no edema.  Excessive bruising.  Removal of packing and evacuation of excessive clot active arterial bleeding was identified from a position posterior to the middle turbinate.    Results Review:    I reviewed the patient's new clinical results.  I reviewed the patient's other test results and agree with the interpretation    Assessment/Plan       * No active hospital problems. *      Severe left posterior " epistaxis in a patient status post endoscopic resection intranasal mass or graft     Plan: Exam under anesthesia for endoscopic management posterior epistaxis    I discussed the patients findings and my recommendations with patient.     Jenaro Pierce MD  11/01/19  1:02 PM    Time: 20min

## 2019-11-01 NOTE — PERIOPERATIVE NURSING NOTE
Patient came to Phase 2 recovery after receiving pain medicine, but with a complaint of still having nasal and headache pain. IV pain meds as ordered  were given with little relief. Dr. Gant was the doctor on call and was called as  the patient did not have a prescription for pain medicine . The patient stated that he does not have any pain medicine left from his procedure 2 weeks ago.  Dr. Gant was aware and advised Tylenol and ice for home, and go to ER if pain not tolerable. The Patient is not happy with this result.

## (undated) DEVICE — TOWEL,OR,DSP,ST,BLUE,STD,4/PK,20PK/CS: Brand: MEDLINE

## (undated) DEVICE — FLOSEAL MATRIX IS INDICATED IN SURGICAL PROCEDURES (OTHER THAN IN OPHTHALMIC) AS AN ADJUNCT TO HEMOSTASIS WHEN CONTROL OF BLEEDING BY LIGATURE OR CONVENTIONAL PROCEDURES IS INEFFECTIVE OR IMPRACTICAL.: Brand: FLOSEAL HEMOSTATIC MATRIX

## (undated) DEVICE — SUT VIC 0/0 UR6 27IN DYED J603H

## (undated) DEVICE — COVER,MAYO STAND,STERILE: Brand: MEDLINE

## (undated) DEVICE — ELECTRD BLD EXT EDGE 1P COAT 6.5IN STRL

## (undated) DEVICE — PK NEURO SPINE 40

## (undated) DEVICE — GLV SURG BIOGEL LTX PF 8

## (undated) DEVICE — 6.0MM PRECISION ROUND

## (undated) DEVICE — KT ANTI FOG W/FLD AND SPNG

## (undated) DEVICE — SMOKE EVACUATION TUBING WITH 7/8 IN TO 1/4 IN REDUCER: Brand: BUFFALO FILTER

## (undated) DEVICE — COAGULATOR SXN FTSWTCH 10F6IN

## (undated) DEVICE — DRP MICROSCP LEICA W/GLASS LENS

## (undated) DEVICE — COLLECTION SOCK, GENERAL: Brand: DEROYAL

## (undated) DEVICE — SUT VIC 2/0 CT2 27IN J269H

## (undated) DEVICE — CODMAN® SURGICAL PATTIES 3/4" X 3/4" (1.91CM X 1.91CM): Brand: CODMAN®

## (undated) DEVICE — IRRIGATOR BULB ASEPTO 60CC STRL

## (undated) DEVICE — CODMAN® SURGICAL PATTIES 1/2" X 3" (1.27CM X 7.62CM): Brand: CODMAN®

## (undated) DEVICE — DRSNG WND BORDR/ADHS NONADHR/GZ LF 2X2IN STRL

## (undated) DEVICE — NDL TARGET BVL TP 11G 15CM

## (undated) DEVICE — ADHS SKIN DERMABOND TOP ADVANCED

## (undated) DEVICE — NDL HYPO ECLPS SFTY 22G 1 1/2IN

## (undated) DEVICE — DRESSING 440400 10PK STD NASAL 4.5CM L: Brand: MEROCEL®

## (undated) DEVICE — 3.0MM NEURO (MATCH HEAD) LESS AGGRESSIVE

## (undated) DEVICE — APPL DURAPREP IODOPHOR APL 26ML

## (undated) DEVICE — DRSNG BRDR MEPILEX P/OP SIL 4X8IN

## (undated) DEVICE — CONN TBG Y 5 IN 1 LF STRL

## (undated) DEVICE — 3M™ STERI-STRIP™ REINFORCED ADHESIVE SKIN CLOSURES, R1547, 1/2 IN X 4 IN (12 MM X 100 MM), 6 STRIPS/ENVELOPE: Brand: 3M™ STERI-STRIP™

## (undated) DEVICE — NDL SPINE 22G 31/2IN BLK

## (undated) DEVICE — 3M™ STERI-STRIP™ COMPOUND BENZOIN TINCTURE 40 BAGS/CARTON 4 CARTONS/CASE C1544: Brand: 3M™ STERI-STRIP™

## (undated) DEVICE — DRESSING 440402 MEROCEL 10PK STD 8CM: Brand: MEROCEL

## (undated) DEVICE — PK ENT FESS 40

## (undated) DEVICE — BIPOLAR SEALER 23-112-1 AQM 6.0: Brand: AQUAMANTYS™

## (undated) DEVICE — GLV SURG TRIUMPH CLASSIC PF LTX 8 STRL

## (undated) DEVICE — SUT MNCRYL 3/0 PS2 18IN MCP497G

## (undated) DEVICE — ILLICO BLUNT PIN: Brand: ILLICO

## (undated) DEVICE — GLV SURG BIOGEL LTX PF 7 1/2

## (undated) DEVICE — GLV SURG TRIUMPH CLASSIC PF LTX 6 STRL